# Patient Record
Sex: FEMALE | Race: WHITE | NOT HISPANIC OR LATINO | Employment: OTHER | URBAN - METROPOLITAN AREA
[De-identification: names, ages, dates, MRNs, and addresses within clinical notes are randomized per-mention and may not be internally consistent; named-entity substitution may affect disease eponyms.]

---

## 2017-02-18 ENCOUNTER — COMMUNICATION - HEALTHEAST (OUTPATIENT)
Dept: FAMILY MEDICINE | Facility: CLINIC | Age: 77
End: 2017-02-18

## 2017-02-18 DIAGNOSIS — I10 ESSENTIAL HYPERTENSION, BENIGN: ICD-10-CM

## 2017-03-08 ENCOUNTER — OFFICE VISIT - HEALTHEAST (OUTPATIENT)
Dept: FAMILY MEDICINE | Facility: CLINIC | Age: 77
End: 2017-03-08

## 2017-03-08 ENCOUNTER — RECORDS - HEALTHEAST (OUTPATIENT)
Dept: GENERAL RADIOLOGY | Facility: CLINIC | Age: 77
End: 2017-03-08

## 2017-03-08 DIAGNOSIS — R05.9 COUGH: ICD-10-CM

## 2017-03-08 DIAGNOSIS — I10 ESSENTIAL HYPERTENSION, BENIGN: ICD-10-CM

## 2017-03-08 DIAGNOSIS — E11.9 TYPE 2 DIABETES MELLITUS WITHOUT COMPLICATION (H): ICD-10-CM

## 2017-03-08 DIAGNOSIS — E11.9 TYPE II DIABETES MELLITUS (H): ICD-10-CM

## 2017-03-08 DIAGNOSIS — M19.049 OSTEOARTHROSIS, HAND: ICD-10-CM

## 2017-03-08 LAB
CHOLEST SERPL-MCNC: 181 MG/DL
FASTING STATUS PATIENT QL REPORTED: YES
HBA1C MFR BLD: 5.7 % (ref 3.5–6)
HDLC SERPL-MCNC: 55 MG/DL
LDLC SERPL CALC-MCNC: 62 MG/DL
TRIGL SERPL-MCNC: 322 MG/DL

## 2017-03-08 RX ORDER — BLOOD PRESSURE TEST KIT
KIT MISCELLANEOUS
Qty: 1 EACH | Refills: 0 | Status: SHIPPED | OUTPATIENT
Start: 2017-03-08 | End: 2022-10-18

## 2017-03-08 ASSESSMENT — MIFFLIN-ST. JEOR: SCORE: 1103.19

## 2017-03-10 ENCOUNTER — COMMUNICATION - HEALTHEAST (OUTPATIENT)
Dept: FAMILY MEDICINE | Facility: CLINIC | Age: 77
End: 2017-03-10

## 2017-04-26 ENCOUNTER — RECORDS - HEALTHEAST (OUTPATIENT)
Dept: ADMINISTRATIVE | Facility: OTHER | Age: 77
End: 2017-04-26

## 2017-05-23 ENCOUNTER — COMMUNICATION - HEALTHEAST (OUTPATIENT)
Dept: FAMILY MEDICINE | Facility: CLINIC | Age: 77
End: 2017-05-23

## 2017-05-23 DIAGNOSIS — R73.01 IMPAIRED FASTING GLUCOSE: ICD-10-CM

## 2017-05-25 ENCOUNTER — COMMUNICATION - HEALTHEAST (OUTPATIENT)
Dept: FAMILY MEDICINE | Facility: CLINIC | Age: 77
End: 2017-05-25

## 2017-06-01 ENCOUNTER — COMMUNICATION - HEALTHEAST (OUTPATIENT)
Dept: FAMILY MEDICINE | Facility: CLINIC | Age: 77
End: 2017-06-01

## 2017-06-01 DIAGNOSIS — R51.9 HEADACHE: ICD-10-CM

## 2017-06-06 ENCOUNTER — COMMUNICATION - HEALTHEAST (OUTPATIENT)
Dept: FAMILY MEDICINE | Facility: CLINIC | Age: 77
End: 2017-06-06

## 2017-06-06 DIAGNOSIS — R73.01 IMPAIRED FASTING GLUCOSE: ICD-10-CM

## 2017-06-06 DIAGNOSIS — I10 ESSENTIAL HYPERTENSION, BENIGN: ICD-10-CM

## 2017-07-03 ENCOUNTER — COMMUNICATION - HEALTHEAST (OUTPATIENT)
Dept: FAMILY MEDICINE | Facility: CLINIC | Age: 77
End: 2017-07-03

## 2017-07-03 DIAGNOSIS — M19.049 OSTEOARTHROSIS, HAND: ICD-10-CM

## 2017-07-05 ENCOUNTER — COMMUNICATION - HEALTHEAST (OUTPATIENT)
Dept: FAMILY MEDICINE | Facility: CLINIC | Age: 77
End: 2017-07-05

## 2017-07-05 DIAGNOSIS — M19.049 OSTEOARTHROSIS, HAND: ICD-10-CM

## 2017-07-13 ENCOUNTER — COMMUNICATION - HEALTHEAST (OUTPATIENT)
Dept: FAMILY MEDICINE | Facility: CLINIC | Age: 77
End: 2017-07-13

## 2017-07-13 DIAGNOSIS — R06.2 WHEEZING: ICD-10-CM

## 2017-07-19 ENCOUNTER — COMMUNICATION - HEALTHEAST (OUTPATIENT)
Dept: ENDOCRINOLOGY | Facility: CLINIC | Age: 77
End: 2017-07-19

## 2017-07-19 DIAGNOSIS — E11.9 DIABETES (H): ICD-10-CM

## 2017-08-31 ENCOUNTER — COMMUNICATION - HEALTHEAST (OUTPATIENT)
Dept: FAMILY MEDICINE | Facility: CLINIC | Age: 77
End: 2017-08-31

## 2017-08-31 DIAGNOSIS — R51.9 HEADACHE: ICD-10-CM

## 2017-09-12 ENCOUNTER — COMMUNICATION - HEALTHEAST (OUTPATIENT)
Dept: FAMILY MEDICINE | Facility: CLINIC | Age: 77
End: 2017-09-12

## 2017-09-18 ENCOUNTER — COMMUNICATION - HEALTHEAST (OUTPATIENT)
Dept: FAMILY MEDICINE | Facility: CLINIC | Age: 77
End: 2017-09-18

## 2017-09-18 ENCOUNTER — OFFICE VISIT - HEALTHEAST (OUTPATIENT)
Dept: FAMILY MEDICINE | Facility: CLINIC | Age: 77
End: 2017-09-18

## 2017-09-18 DIAGNOSIS — Z00.00 HEALTHCARE MAINTENANCE: ICD-10-CM

## 2017-09-18 DIAGNOSIS — I10 HTN (HYPERTENSION): ICD-10-CM

## 2017-09-18 DIAGNOSIS — E11.9 DM (DIABETES MELLITUS) (H): ICD-10-CM

## 2017-09-18 DIAGNOSIS — Z78.0 POST-MENOPAUSAL: ICD-10-CM

## 2017-09-18 DIAGNOSIS — E78.5 HYPERLIPIDEMIA: ICD-10-CM

## 2017-09-18 DIAGNOSIS — R06.2 WHEEZING: ICD-10-CM

## 2017-09-18 LAB — HBA1C MFR BLD: 6.4 % (ref 3.5–6)

## 2017-09-20 ENCOUNTER — COMMUNICATION - HEALTHEAST (OUTPATIENT)
Dept: PULMONOLOGY | Facility: OTHER | Age: 77
End: 2017-09-20

## 2017-10-06 ENCOUNTER — RECORDS - HEALTHEAST (OUTPATIENT)
Dept: ADMINISTRATIVE | Facility: OTHER | Age: 77
End: 2017-10-06

## 2017-10-06 ENCOUNTER — RECORDS - HEALTHEAST (OUTPATIENT)
Dept: BONE DENSITY | Facility: CLINIC | Age: 77
End: 2017-10-06

## 2017-10-06 ENCOUNTER — HOSPITAL ENCOUNTER (OUTPATIENT)
Dept: MAMMOGRAPHY | Facility: HOSPITAL | Age: 77
Discharge: HOME | End: 2017-10-06
Attending: FAMILY MEDICINE

## 2017-10-06 DIAGNOSIS — Z78.0 ASYMPTOMATIC MENOPAUSAL STATE: ICD-10-CM

## 2017-10-06 DIAGNOSIS — Z12.31 VISIT FOR SCREENING MAMMOGRAM: ICD-10-CM

## 2017-10-06 DIAGNOSIS — Z00.00 HEALTHCARE MAINTENANCE: ICD-10-CM

## 2017-10-11 ENCOUNTER — COMMUNICATION - HEALTHEAST (OUTPATIENT)
Dept: FAMILY MEDICINE | Facility: CLINIC | Age: 77
End: 2017-10-11

## 2017-10-11 DIAGNOSIS — M19.049 OSTEOARTHROSIS, HAND: ICD-10-CM

## 2017-10-16 ENCOUNTER — COMMUNICATION - HEALTHEAST (OUTPATIENT)
Dept: FAMILY MEDICINE | Facility: CLINIC | Age: 77
End: 2017-10-16

## 2017-10-17 ENCOUNTER — RECORDS - HEALTHEAST (OUTPATIENT)
Dept: PULMONOLOGY | Facility: OTHER | Age: 77
End: 2017-10-17

## 2017-10-17 ENCOUNTER — RECORDS - HEALTHEAST (OUTPATIENT)
Dept: ADMINISTRATIVE | Facility: OTHER | Age: 77
End: 2017-10-17

## 2017-10-17 DIAGNOSIS — R06.2 WHEEZING: ICD-10-CM

## 2017-10-20 ENCOUNTER — COMMUNICATION - HEALTHEAST (OUTPATIENT)
Dept: FAMILY MEDICINE | Facility: CLINIC | Age: 77
End: 2017-10-20

## 2017-10-25 ENCOUNTER — COMMUNICATION - HEALTHEAST (OUTPATIENT)
Dept: FAMILY MEDICINE | Facility: CLINIC | Age: 77
End: 2017-10-25

## 2017-10-25 DIAGNOSIS — M19.049 OSTEOARTHROSIS, HAND: ICD-10-CM

## 2017-10-26 ENCOUNTER — HOSPITAL ENCOUNTER (OUTPATIENT)
Dept: CT IMAGING | Facility: HOSPITAL | Age: 77
Discharge: HOME | End: 2017-10-26
Attending: INTERNAL MEDICINE

## 2017-10-26 ENCOUNTER — OFFICE VISIT - HEALTHEAST (OUTPATIENT)
Dept: PULMONOLOGY | Facility: OTHER | Age: 77
End: 2017-10-26

## 2017-10-26 DIAGNOSIS — R05.8 UPPER AIRWAY COUGH SYNDROME: ICD-10-CM

## 2017-10-26 DIAGNOSIS — I10 ESSENTIAL HYPERTENSION: ICD-10-CM

## 2017-10-26 ASSESSMENT — MIFFLIN-ST. JEOR: SCORE: 1145.82

## 2017-10-27 LAB — TOTAL IGE - HISTORICAL: 260 KU/L (ref 0–100)

## 2017-11-02 ENCOUNTER — COMMUNICATION - HEALTHEAST (OUTPATIENT)
Dept: PULMONOLOGY | Facility: OTHER | Age: 77
End: 2017-11-02

## 2017-11-03 ENCOUNTER — COMMUNICATION - HEALTHEAST (OUTPATIENT)
Dept: FAMILY MEDICINE | Facility: CLINIC | Age: 77
End: 2017-11-03

## 2017-12-06 ENCOUNTER — COMMUNICATION - HEALTHEAST (OUTPATIENT)
Dept: FAMILY MEDICINE | Facility: CLINIC | Age: 77
End: 2017-12-06

## 2017-12-06 DIAGNOSIS — R51.9 HEADACHE: ICD-10-CM

## 2017-12-14 ENCOUNTER — COMMUNICATION - HEALTHEAST (OUTPATIENT)
Dept: FAMILY MEDICINE | Facility: CLINIC | Age: 77
End: 2017-12-14

## 2017-12-14 DIAGNOSIS — E11.9 DIABETES (H): ICD-10-CM

## 2018-01-08 ENCOUNTER — OFFICE VISIT - HEALTHEAST (OUTPATIENT)
Dept: FAMILY MEDICINE | Facility: CLINIC | Age: 78
End: 2018-01-08

## 2018-01-08 DIAGNOSIS — J98.01 ACUTE BRONCHOSPASM: ICD-10-CM

## 2018-01-08 DIAGNOSIS — J40 BRONCHITIS: ICD-10-CM

## 2018-01-17 ENCOUNTER — COMMUNICATION - HEALTHEAST (OUTPATIENT)
Dept: FAMILY MEDICINE | Facility: CLINIC | Age: 78
End: 2018-01-17

## 2018-01-17 DIAGNOSIS — M19.049 OSTEOARTHROSIS, HAND: ICD-10-CM

## 2018-02-22 ENCOUNTER — OFFICE VISIT - HEALTHEAST (OUTPATIENT)
Dept: PULMONOLOGY | Facility: OTHER | Age: 78
End: 2018-02-22

## 2018-02-22 ENCOUNTER — AMBULATORY - HEALTHEAST (OUTPATIENT)
Dept: PULMONOLOGY | Facility: OTHER | Age: 78
End: 2018-02-22

## 2018-02-22 DIAGNOSIS — R06.2 WHEEZING: ICD-10-CM

## 2018-02-22 DIAGNOSIS — J45.40 MODERATE PERSISTENT ASTHMA WITHOUT COMPLICATION: ICD-10-CM

## 2018-02-26 ENCOUNTER — RECORDS - HEALTHEAST (OUTPATIENT)
Dept: ADMINISTRATIVE | Facility: OTHER | Age: 78
End: 2018-02-26

## 2018-02-26 ENCOUNTER — COMMUNICATION - HEALTHEAST (OUTPATIENT)
Dept: PULMONOLOGY | Facility: OTHER | Age: 78
End: 2018-02-26

## 2018-03-05 ENCOUNTER — COMMUNICATION - HEALTHEAST (OUTPATIENT)
Dept: PULMONOLOGY | Facility: OTHER | Age: 78
End: 2018-03-05

## 2018-03-06 ENCOUNTER — AMBULATORY - HEALTHEAST (OUTPATIENT)
Dept: PULMONOLOGY | Facility: OTHER | Age: 78
End: 2018-03-06

## 2018-03-06 DIAGNOSIS — J45.40 MODERATE PERSISTENT ASTHMA WITHOUT COMPLICATION: ICD-10-CM

## 2018-03-06 RX ORDER — ALBUTEROL SULFATE 0.83 MG/ML
2.5 SOLUTION RESPIRATORY (INHALATION) EVERY 6 HOURS PRN
Qty: 380 ML | Refills: 11 | Status: SHIPPED | OUTPATIENT
Start: 2018-03-06 | End: 2018-07-16

## 2018-03-08 ENCOUNTER — AMBULATORY - HEALTHEAST (OUTPATIENT)
Dept: PULMONOLOGY | Facility: OTHER | Age: 78
End: 2018-03-08

## 2018-03-08 ENCOUNTER — COMMUNICATION - HEALTHEAST (OUTPATIENT)
Dept: PULMONOLOGY | Facility: OTHER | Age: 78
End: 2018-03-08

## 2018-03-08 DIAGNOSIS — J45.909 ASTHMA: ICD-10-CM

## 2018-03-09 ENCOUNTER — COMMUNICATION - HEALTHEAST (OUTPATIENT)
Dept: PULMONOLOGY | Facility: OTHER | Age: 78
End: 2018-03-09

## 2018-03-18 ENCOUNTER — COMMUNICATION - HEALTHEAST (OUTPATIENT)
Dept: FAMILY MEDICINE | Facility: CLINIC | Age: 78
End: 2018-03-18

## 2018-03-18 DIAGNOSIS — E78.5 HYPERLIPIDEMIA: ICD-10-CM

## 2018-03-31 ENCOUNTER — OFFICE VISIT - HEALTHEAST (OUTPATIENT)
Dept: FAMILY MEDICINE | Facility: CLINIC | Age: 78
End: 2018-03-31

## 2018-03-31 DIAGNOSIS — R09.82 POST-NASAL DISCHARGE: ICD-10-CM

## 2018-03-31 DIAGNOSIS — R07.0 THROAT PAIN: ICD-10-CM

## 2018-03-31 LAB — DEPRECATED S PYO AG THROAT QL EIA: NORMAL

## 2018-04-01 LAB — GROUP A STREP BY PCR: NORMAL

## 2018-04-12 ENCOUNTER — COMMUNICATION - HEALTHEAST (OUTPATIENT)
Dept: FAMILY MEDICINE | Facility: CLINIC | Age: 78
End: 2018-04-12

## 2018-04-12 DIAGNOSIS — M19.049 OSTEOARTHROSIS, HAND: ICD-10-CM

## 2018-04-25 ENCOUNTER — OFFICE VISIT - HEALTHEAST (OUTPATIENT)
Dept: FAMILY MEDICINE | Facility: CLINIC | Age: 78
End: 2018-04-25

## 2018-04-25 DIAGNOSIS — E11.9 TYPE II DIABETES MELLITUS (H): ICD-10-CM

## 2018-04-25 DIAGNOSIS — J45.40 MODERATE PERSISTENT ASTHMA WITHOUT COMPLICATION: ICD-10-CM

## 2018-04-25 LAB
ALBUMIN SERPL-MCNC: 3.6 G/DL (ref 3.5–5)
ALP SERPL-CCNC: 74 U/L (ref 45–120)
ALT SERPL W P-5'-P-CCNC: 22 U/L (ref 0–45)
ANION GAP SERPL CALCULATED.3IONS-SCNC: 11 MMOL/L (ref 5–18)
AST SERPL W P-5'-P-CCNC: 18 U/L (ref 0–40)
BILIRUB SERPL-MCNC: 1.2 MG/DL (ref 0–1)
BUN SERPL-MCNC: 17 MG/DL (ref 8–28)
CALCIUM SERPL-MCNC: 9.5 MG/DL (ref 8.5–10.5)
CHLORIDE BLD-SCNC: 101 MMOL/L (ref 98–107)
CHOLEST SERPL-MCNC: 168 MG/DL
CO2 SERPL-SCNC: 26 MMOL/L (ref 22–31)
CREAT SERPL-MCNC: 0.9 MG/DL (ref 0.6–1.1)
FASTING STATUS PATIENT QL REPORTED: YES
GFR SERPL CREATININE-BSD FRML MDRD: >60 ML/MIN/1.73M2
GLUCOSE BLD-MCNC: 107 MG/DL (ref 70–125)
HBA1C MFR BLD: 6.5 % (ref 3.5–6)
HDLC SERPL-MCNC: 56 MG/DL
LDLC SERPL CALC-MCNC: 63 MG/DL
POTASSIUM BLD-SCNC: 4.3 MMOL/L (ref 3.5–5)
PROT SERPL-MCNC: 6.3 G/DL (ref 6–8)
SODIUM SERPL-SCNC: 138 MMOL/L (ref 136–145)
TRIGL SERPL-MCNC: 245 MG/DL

## 2018-04-26 ENCOUNTER — OFFICE VISIT - HEALTHEAST (OUTPATIENT)
Dept: PULMONOLOGY | Facility: OTHER | Age: 78
End: 2018-04-26

## 2018-04-26 ENCOUNTER — COMMUNICATION - HEALTHEAST (OUTPATIENT)
Dept: FAMILY MEDICINE | Facility: CLINIC | Age: 78
End: 2018-04-26

## 2018-04-26 DIAGNOSIS — R76.8 ELEVATED IGE LEVEL: ICD-10-CM

## 2018-04-26 DIAGNOSIS — J45.40 MODERATE PERSISTENT ASTHMA WITHOUT COMPLICATION: ICD-10-CM

## 2018-05-02 ENCOUNTER — RECORDS - HEALTHEAST (OUTPATIENT)
Dept: ADMINISTRATIVE | Facility: OTHER | Age: 78
End: 2018-05-02

## 2018-05-17 ENCOUNTER — OFFICE VISIT - HEALTHEAST (OUTPATIENT)
Dept: ALLERGY | Facility: CLINIC | Age: 78
End: 2018-05-17

## 2018-05-17 DIAGNOSIS — J45.20 MILD INTERMITTENT ASTHMA WITHOUT COMPLICATION: ICD-10-CM

## 2018-05-17 DIAGNOSIS — R09.81 NASAL CONGESTION: ICD-10-CM

## 2018-05-17 ASSESSMENT — MIFFLIN-ST. JEOR: SCORE: 1134.93

## 2018-05-31 ENCOUNTER — COMMUNICATION - HEALTHEAST (OUTPATIENT)
Dept: FAMILY MEDICINE | Facility: CLINIC | Age: 78
End: 2018-05-31

## 2018-05-31 DIAGNOSIS — I10 ESSENTIAL HYPERTENSION, BENIGN: ICD-10-CM

## 2018-06-05 ENCOUNTER — COMMUNICATION - HEALTHEAST (OUTPATIENT)
Dept: FAMILY MEDICINE | Facility: CLINIC | Age: 78
End: 2018-06-05

## 2018-06-05 DIAGNOSIS — R51.9 HEADACHE: ICD-10-CM

## 2018-07-16 ENCOUNTER — OFFICE VISIT - HEALTHEAST (OUTPATIENT)
Dept: PULMONOLOGY | Facility: OTHER | Age: 78
End: 2018-07-16

## 2018-07-16 DIAGNOSIS — J45.20 MILD INTERMITTENT ASTHMA WITHOUT COMPLICATION: ICD-10-CM

## 2018-07-22 ENCOUNTER — COMMUNICATION - HEALTHEAST (OUTPATIENT)
Dept: FAMILY MEDICINE | Facility: CLINIC | Age: 78
End: 2018-07-22

## 2018-07-22 DIAGNOSIS — I10 ESSENTIAL HYPERTENSION, BENIGN: ICD-10-CM

## 2018-08-03 ENCOUNTER — COMMUNICATION - HEALTHEAST (OUTPATIENT)
Dept: FAMILY MEDICINE | Facility: CLINIC | Age: 78
End: 2018-08-03

## 2018-08-03 DIAGNOSIS — R73.01 IMPAIRED FASTING GLUCOSE: ICD-10-CM

## 2018-08-28 ENCOUNTER — COMMUNICATION - HEALTHEAST (OUTPATIENT)
Dept: FAMILY MEDICINE | Facility: CLINIC | Age: 78
End: 2018-08-28

## 2018-08-28 DIAGNOSIS — M19.049 OSTEOARTHROSIS, HAND: ICD-10-CM

## 2018-09-18 ENCOUNTER — COMMUNICATION - HEALTHEAST (OUTPATIENT)
Dept: FAMILY MEDICINE | Facility: CLINIC | Age: 78
End: 2018-09-18

## 2018-09-18 DIAGNOSIS — E78.5 HYPERLIPIDEMIA: ICD-10-CM

## 2018-09-20 ENCOUNTER — COMMUNICATION - HEALTHEAST (OUTPATIENT)
Dept: FAMILY MEDICINE | Facility: CLINIC | Age: 78
End: 2018-09-20

## 2018-09-20 DIAGNOSIS — M19.049 OSTEOARTHROSIS, HAND: ICD-10-CM

## 2018-10-17 ENCOUNTER — OFFICE VISIT - HEALTHEAST (OUTPATIENT)
Dept: FAMILY MEDICINE | Facility: CLINIC | Age: 78
End: 2018-10-17

## 2018-10-17 DIAGNOSIS — R51.9 HEADACHE: ICD-10-CM

## 2018-10-17 DIAGNOSIS — E11.9 TYPE II DIABETES MELLITUS (H): ICD-10-CM

## 2018-10-17 DIAGNOSIS — G47.00 INSOMNIA, UNSPECIFIED TYPE: ICD-10-CM

## 2018-10-17 DIAGNOSIS — E11.9 DIABETES MELLITUS (H): ICD-10-CM

## 2018-10-17 DIAGNOSIS — I10 ESSENTIAL HYPERTENSION: ICD-10-CM

## 2018-10-17 LAB
CREAT UR-MCNC: 21.8 MG/DL
HBA1C MFR BLD: 6.5 % (ref 3.5–6)
MICROALBUMIN UR-MCNC: <0.5 MG/DL (ref 0–1.99)
MICROALBUMIN/CREAT UR: NORMAL MG/G

## 2018-10-18 ENCOUNTER — COMMUNICATION - HEALTHEAST (OUTPATIENT)
Dept: FAMILY MEDICINE | Facility: CLINIC | Age: 78
End: 2018-10-18

## 2018-11-25 ENCOUNTER — COMMUNICATION - HEALTHEAST (OUTPATIENT)
Dept: ENDOCRINOLOGY | Facility: CLINIC | Age: 78
End: 2018-11-25

## 2018-11-25 DIAGNOSIS — E11.9 DIABETES (H): ICD-10-CM

## 2018-11-29 ENCOUNTER — COMMUNICATION - HEALTHEAST (OUTPATIENT)
Dept: FAMILY MEDICINE | Facility: CLINIC | Age: 78
End: 2018-11-29

## 2018-11-29 DIAGNOSIS — E11.9 DIABETES (H): ICD-10-CM

## 2018-12-03 ENCOUNTER — COMMUNICATION - HEALTHEAST (OUTPATIENT)
Dept: FAMILY MEDICINE | Facility: CLINIC | Age: 78
End: 2018-12-03

## 2018-12-13 ENCOUNTER — COMMUNICATION - HEALTHEAST (OUTPATIENT)
Dept: FAMILY MEDICINE | Facility: CLINIC | Age: 78
End: 2018-12-13

## 2018-12-21 ENCOUNTER — COMMUNICATION - HEALTHEAST (OUTPATIENT)
Dept: FAMILY MEDICINE | Facility: CLINIC | Age: 78
End: 2018-12-21

## 2019-01-09 ENCOUNTER — RECORDS - HEALTHEAST (OUTPATIENT)
Dept: ADMINISTRATIVE | Facility: OTHER | Age: 79
End: 2019-01-09

## 2019-01-16 ENCOUNTER — COMMUNICATION - HEALTHEAST (OUTPATIENT)
Dept: FAMILY MEDICINE | Facility: CLINIC | Age: 79
End: 2019-01-16

## 2019-01-16 DIAGNOSIS — I10 ESSENTIAL HYPERTENSION, BENIGN: ICD-10-CM

## 2019-01-19 ENCOUNTER — COMMUNICATION - HEALTHEAST (OUTPATIENT)
Dept: FAMILY MEDICINE | Facility: CLINIC | Age: 79
End: 2019-01-19

## 2019-01-19 DIAGNOSIS — R51.9 HEADACHE: ICD-10-CM

## 2019-01-19 DIAGNOSIS — G47.00 INSOMNIA, UNSPECIFIED TYPE: ICD-10-CM

## 2019-01-24 ENCOUNTER — OFFICE VISIT - HEALTHEAST (OUTPATIENT)
Dept: FAMILY MEDICINE | Facility: CLINIC | Age: 79
End: 2019-01-24

## 2019-01-24 ENCOUNTER — COMMUNICATION - HEALTHEAST (OUTPATIENT)
Dept: SCHEDULING | Facility: CLINIC | Age: 79
End: 2019-01-24

## 2019-01-24 DIAGNOSIS — J40 BRONCHITIS: ICD-10-CM

## 2019-02-22 ENCOUNTER — COMMUNICATION - HEALTHEAST (OUTPATIENT)
Dept: PULMONOLOGY | Facility: OTHER | Age: 79
End: 2019-02-22

## 2019-02-22 ENCOUNTER — AMBULATORY - HEALTHEAST (OUTPATIENT)
Dept: PULMONOLOGY | Facility: OTHER | Age: 79
End: 2019-02-22

## 2019-02-22 DIAGNOSIS — J45.21 MILD INTERMITTENT ASTHMA WITH EXACERBATION: ICD-10-CM

## 2019-03-07 ENCOUNTER — COMMUNICATION - HEALTHEAST (OUTPATIENT)
Dept: FAMILY MEDICINE | Facility: CLINIC | Age: 79
End: 2019-03-07

## 2019-03-07 DIAGNOSIS — I10 ESSENTIAL HYPERTENSION, BENIGN: ICD-10-CM

## 2019-03-08 ENCOUNTER — OFFICE VISIT - HEALTHEAST (OUTPATIENT)
Dept: PULMONOLOGY | Facility: OTHER | Age: 79
End: 2019-03-08

## 2019-03-08 DIAGNOSIS — J82.83 EOSINOPHILIC ASTHMA: ICD-10-CM

## 2019-03-08 DIAGNOSIS — J45.909 REACTIVE AIRWAY DISEASE WITHOUT COMPLICATION, UNSPECIFIED ASTHMA SEVERITY, UNSPECIFIED WHETHER PERSISTENT: ICD-10-CM

## 2019-03-08 DIAGNOSIS — J45.20 MILD INTERMITTENT ASTHMA WITHOUT COMPLICATION: ICD-10-CM

## 2019-03-13 ENCOUNTER — COMMUNICATION - HEALTHEAST (OUTPATIENT)
Dept: FAMILY MEDICINE | Facility: CLINIC | Age: 79
End: 2019-03-13

## 2019-03-13 DIAGNOSIS — E78.5 HYPERLIPIDEMIA: ICD-10-CM

## 2019-04-17 ENCOUNTER — OFFICE VISIT - HEALTHEAST (OUTPATIENT)
Dept: FAMILY MEDICINE | Facility: CLINIC | Age: 79
End: 2019-04-17

## 2019-04-17 ENCOUNTER — COMMUNICATION - HEALTHEAST (OUTPATIENT)
Dept: FAMILY MEDICINE | Facility: CLINIC | Age: 79
End: 2019-04-17

## 2019-04-17 DIAGNOSIS — J45.909 REACTIVE AIRWAY DISEASE WITHOUT COMPLICATION, UNSPECIFIED ASTHMA SEVERITY, UNSPECIFIED WHETHER PERSISTENT: ICD-10-CM

## 2019-04-17 DIAGNOSIS — E11.22 TYPE 2 DIABETES MELLITUS WITH STAGE 1 CHRONIC KIDNEY DISEASE, WITHOUT LONG-TERM CURRENT USE OF INSULIN (H): ICD-10-CM

## 2019-04-17 DIAGNOSIS — M19.049 OSTEOARTHROSIS, HAND: ICD-10-CM

## 2019-04-17 DIAGNOSIS — D22.9 ATYPICAL NEVUS: ICD-10-CM

## 2019-04-17 DIAGNOSIS — N18.1 TYPE 2 DIABETES MELLITUS WITH STAGE 1 CHRONIC KIDNEY DISEASE, WITHOUT LONG-TERM CURRENT USE OF INSULIN (H): ICD-10-CM

## 2019-04-17 LAB
ALBUMIN SERPL-MCNC: 3.8 G/DL (ref 3.5–5)
ALP SERPL-CCNC: 67 U/L (ref 45–120)
ALT SERPL W P-5'-P-CCNC: 29 U/L (ref 0–45)
ANION GAP SERPL CALCULATED.3IONS-SCNC: 12 MMOL/L (ref 5–18)
AST SERPL W P-5'-P-CCNC: 19 U/L (ref 0–40)
BILIRUB SERPL-MCNC: 0.9 MG/DL (ref 0–1)
BUN SERPL-MCNC: 22 MG/DL (ref 8–28)
CALCIUM SERPL-MCNC: 9.3 MG/DL (ref 8.5–10.5)
CHLORIDE BLD-SCNC: 97 MMOL/L (ref 98–107)
CHOLEST SERPL-MCNC: 151 MG/DL
CO2 SERPL-SCNC: 26 MMOL/L (ref 22–31)
CREAT SERPL-MCNC: 0.91 MG/DL (ref 0.6–1.1)
FASTING STATUS PATIENT QL REPORTED: YES
GFR SERPL CREATININE-BSD FRML MDRD: 60 ML/MIN/1.73M2
GLUCOSE BLD-MCNC: 115 MG/DL (ref 70–125)
HBA1C MFR BLD: 6.8 % (ref 3.5–6)
HDLC SERPL-MCNC: 56 MG/DL
LDLC SERPL CALC-MCNC: 39 MG/DL
POTASSIUM BLD-SCNC: 3.7 MMOL/L (ref 3.5–5)
PROT SERPL-MCNC: 6.2 G/DL (ref 6–8)
SODIUM SERPL-SCNC: 135 MMOL/L (ref 136–145)
TRIGL SERPL-MCNC: 281 MG/DL

## 2019-04-18 ENCOUNTER — COMMUNICATION - HEALTHEAST (OUTPATIENT)
Dept: FAMILY MEDICINE | Facility: CLINIC | Age: 79
End: 2019-04-18

## 2019-04-24 ENCOUNTER — COMMUNICATION - HEALTHEAST (OUTPATIENT)
Dept: FAMILY MEDICINE | Facility: CLINIC | Age: 79
End: 2019-04-24

## 2019-04-24 DIAGNOSIS — I10 ESSENTIAL HYPERTENSION, BENIGN: ICD-10-CM

## 2019-04-25 ENCOUNTER — AMBULATORY - HEALTHEAST (OUTPATIENT)
Dept: NURSING | Facility: CLINIC | Age: 79
End: 2019-04-25

## 2019-04-25 DIAGNOSIS — I10 ESSENTIAL HYPERTENSION: ICD-10-CM

## 2019-04-27 ENCOUNTER — COMMUNICATION - HEALTHEAST (OUTPATIENT)
Dept: FAMILY MEDICINE | Facility: CLINIC | Age: 79
End: 2019-04-27

## 2019-04-27 DIAGNOSIS — R73.01 IMPAIRED FASTING GLUCOSE: ICD-10-CM

## 2019-05-08 ENCOUNTER — RECORDS - HEALTHEAST (OUTPATIENT)
Dept: ADMINISTRATIVE | Facility: OTHER | Age: 79
End: 2019-05-08

## 2019-05-16 ENCOUNTER — RECORDS - HEALTHEAST (OUTPATIENT)
Dept: HEALTH INFORMATION MANAGEMENT | Facility: CLINIC | Age: 79
End: 2019-05-16

## 2019-06-04 ENCOUNTER — COMMUNICATION - HEALTHEAST (OUTPATIENT)
Dept: FAMILY MEDICINE | Facility: CLINIC | Age: 79
End: 2019-06-04

## 2019-06-04 DIAGNOSIS — N18.1 TYPE 2 DIABETES MELLITUS WITH STAGE 1 CHRONIC KIDNEY DISEASE, WITHOUT LONG-TERM CURRENT USE OF INSULIN (H): ICD-10-CM

## 2019-06-04 DIAGNOSIS — E11.22 TYPE 2 DIABETES MELLITUS WITH STAGE 1 CHRONIC KIDNEY DISEASE, WITHOUT LONG-TERM CURRENT USE OF INSULIN (H): ICD-10-CM

## 2019-06-10 ENCOUNTER — COMMUNICATION - HEALTHEAST (OUTPATIENT)
Dept: FAMILY MEDICINE | Facility: CLINIC | Age: 79
End: 2019-06-10

## 2019-06-10 DIAGNOSIS — M19.049 OSTEOARTHROSIS, HAND: ICD-10-CM

## 2019-07-17 ENCOUNTER — COMMUNICATION - HEALTHEAST (OUTPATIENT)
Dept: FAMILY MEDICINE | Facility: CLINIC | Age: 79
End: 2019-07-17

## 2019-07-17 DIAGNOSIS — G47.00 INSOMNIA, UNSPECIFIED TYPE: ICD-10-CM

## 2019-07-22 ENCOUNTER — COMMUNICATION - HEALTHEAST (OUTPATIENT)
Dept: FAMILY MEDICINE | Facility: CLINIC | Age: 79
End: 2019-07-22

## 2019-07-22 DIAGNOSIS — I10 ESSENTIAL HYPERTENSION, BENIGN: ICD-10-CM

## 2019-09-09 ENCOUNTER — COMMUNICATION - HEALTHEAST (OUTPATIENT)
Dept: FAMILY MEDICINE | Facility: CLINIC | Age: 79
End: 2019-09-09

## 2019-09-09 DIAGNOSIS — I10 ESSENTIAL HYPERTENSION, BENIGN: ICD-10-CM

## 2019-09-13 ENCOUNTER — OFFICE VISIT - HEALTHEAST (OUTPATIENT)
Dept: PULMONOLOGY | Facility: OTHER | Age: 79
End: 2019-09-13

## 2019-09-13 DIAGNOSIS — J45.20 MILD INTERMITTENT ASTHMA WITHOUT COMPLICATION: ICD-10-CM

## 2019-10-14 ENCOUNTER — OFFICE VISIT - HEALTHEAST (OUTPATIENT)
Dept: FAMILY MEDICINE | Facility: CLINIC | Age: 79
End: 2019-10-14

## 2019-10-14 DIAGNOSIS — G56.00 CARPAL TUNNEL SYNDROME, UNSPECIFIED LATERALITY: ICD-10-CM

## 2019-10-14 DIAGNOSIS — G47.00 INSOMNIA, UNSPECIFIED TYPE: ICD-10-CM

## 2019-10-14 DIAGNOSIS — R05.8 UPPER AIRWAY COUGH SYNDROME: ICD-10-CM

## 2019-10-14 DIAGNOSIS — I10 ESSENTIAL HYPERTENSION: ICD-10-CM

## 2019-10-14 DIAGNOSIS — M19.041 OSTEOARTHRITIS OF BOTH HANDS, UNSPECIFIED OSTEOARTHRITIS TYPE: ICD-10-CM

## 2019-10-14 DIAGNOSIS — M19.042 OSTEOARTHRITIS OF BOTH HANDS, UNSPECIFIED OSTEOARTHRITIS TYPE: ICD-10-CM

## 2019-10-14 DIAGNOSIS — G89.29 CHRONIC NONINTRACTABLE HEADACHE, UNSPECIFIED HEADACHE TYPE: ICD-10-CM

## 2019-10-14 DIAGNOSIS — G57.50 TARSAL TUNNEL SYNDROME, UNSPECIFIED LATERALITY: ICD-10-CM

## 2019-10-14 DIAGNOSIS — Z76.89 ESTABLISHING CARE WITH NEW DOCTOR, ENCOUNTER FOR: ICD-10-CM

## 2019-10-14 DIAGNOSIS — M35.00 SICCA SYNDROME (H): ICD-10-CM

## 2019-10-14 DIAGNOSIS — J82.83 EOSINOPHILIC ASTHMA: ICD-10-CM

## 2019-10-14 DIAGNOSIS — N18.1 TYPE 2 DIABETES MELLITUS WITH STAGE 1 CHRONIC KIDNEY DISEASE, WITHOUT LONG-TERM CURRENT USE OF INSULIN (H): ICD-10-CM

## 2019-10-14 DIAGNOSIS — R51.9 CHRONIC NONINTRACTABLE HEADACHE, UNSPECIFIED HEADACHE TYPE: ICD-10-CM

## 2019-10-14 DIAGNOSIS — E11.22 TYPE 2 DIABETES MELLITUS WITH STAGE 1 CHRONIC KIDNEY DISEASE, WITHOUT LONG-TERM CURRENT USE OF INSULIN (H): ICD-10-CM

## 2019-10-14 DIAGNOSIS — E78.5 HYPERLIPIDEMIA, UNSPECIFIED HYPERLIPIDEMIA TYPE: ICD-10-CM

## 2019-10-14 LAB
ALBUMIN SERPL-MCNC: 3.9 G/DL (ref 3.5–5)
ALP SERPL-CCNC: 70 U/L (ref 45–120)
ALT SERPL W P-5'-P-CCNC: 30 U/L (ref 0–45)
ANION GAP SERPL CALCULATED.3IONS-SCNC: 7 MMOL/L (ref 5–18)
AST SERPL W P-5'-P-CCNC: 21 U/L (ref 0–40)
BILIRUB SERPL-MCNC: 1 MG/DL (ref 0–1)
BUN SERPL-MCNC: 14 MG/DL (ref 8–28)
CALCIUM SERPL-MCNC: 9.6 MG/DL (ref 8.5–10.5)
CHLORIDE BLD-SCNC: 99 MMOL/L (ref 98–107)
CHOLEST SERPL-MCNC: 155 MG/DL
CO2 SERPL-SCNC: 30 MMOL/L (ref 22–31)
CREAT SERPL-MCNC: 0.84 MG/DL (ref 0.6–1.1)
CREAT UR-MCNC: 25.2 MG/DL
FASTING STATUS PATIENT QL REPORTED: YES
GFR SERPL CREATININE-BSD FRML MDRD: >60 ML/MIN/1.73M2
GLUCOSE BLD-MCNC: 118 MG/DL (ref 70–125)
HBA1C MFR BLD: 6.4 % (ref 3.5–6)
HDLC SERPL-MCNC: 63 MG/DL
LDLC SERPL CALC-MCNC: 47 MG/DL
MICROALBUMIN UR-MCNC: <0.5 MG/DL (ref 0–1.99)
MICROALBUMIN/CREAT UR: NORMAL MG/G{CREAT}
POTASSIUM BLD-SCNC: 4 MMOL/L (ref 3.5–5)
PROT SERPL-MCNC: 6.3 G/DL (ref 6–8)
SODIUM SERPL-SCNC: 136 MMOL/L (ref 136–145)
TRIGL SERPL-MCNC: 224 MG/DL

## 2019-10-14 ASSESSMENT — ANXIETY QUESTIONNAIRES
5. BEING SO RESTLESS THAT IT IS HARD TO SIT STILL: NOT AT ALL
3. WORRYING TOO MUCH ABOUT DIFFERENT THINGS: NOT AT ALL
6. BECOMING EASILY ANNOYED OR IRRITABLE: NOT AT ALL
7. FEELING AFRAID AS IF SOMETHING AWFUL MIGHT HAPPEN: NOT AT ALL
4. TROUBLE RELAXING: NOT AT ALL
1. FEELING NERVOUS, ANXIOUS, OR ON EDGE: NOT AT ALL
GAD7 TOTAL SCORE: 1
2. NOT BEING ABLE TO STOP OR CONTROL WORRYING: SEVERAL DAYS

## 2019-10-14 ASSESSMENT — MIFFLIN-ST. JEOR: SCORE: 1118.6

## 2019-10-14 ASSESSMENT — PATIENT HEALTH QUESTIONNAIRE - PHQ9: SUM OF ALL RESPONSES TO PHQ QUESTIONS 1-9: 2

## 2019-10-18 ENCOUNTER — COMMUNICATION - HEALTHEAST (OUTPATIENT)
Dept: FAMILY MEDICINE | Facility: CLINIC | Age: 79
End: 2019-10-18

## 2019-10-18 DIAGNOSIS — R73.01 IMPAIRED FASTING GLUCOSE: ICD-10-CM

## 2019-11-07 ENCOUNTER — COMMUNICATION - HEALTHEAST (OUTPATIENT)
Dept: FAMILY MEDICINE | Facility: CLINIC | Age: 79
End: 2019-11-07

## 2019-11-07 DIAGNOSIS — G47.00 INSOMNIA, UNSPECIFIED TYPE: ICD-10-CM

## 2020-01-06 ENCOUNTER — COMMUNICATION - HEALTHEAST (OUTPATIENT)
Dept: FAMILY MEDICINE | Facility: CLINIC | Age: 80
End: 2020-01-06

## 2020-01-06 DIAGNOSIS — M19.049 OSTEOARTHROSIS, HAND: ICD-10-CM

## 2020-01-07 ENCOUNTER — COMMUNICATION - HEALTHEAST (OUTPATIENT)
Dept: FAMILY MEDICINE | Facility: CLINIC | Age: 80
End: 2020-01-07

## 2020-01-07 DIAGNOSIS — M19.049 OSTEOARTHROSIS, HAND: ICD-10-CM

## 2020-01-19 ENCOUNTER — COMMUNICATION - HEALTHEAST (OUTPATIENT)
Dept: FAMILY MEDICINE | Facility: CLINIC | Age: 80
End: 2020-01-19

## 2020-01-19 DIAGNOSIS — I10 ESSENTIAL HYPERTENSION, BENIGN: ICD-10-CM

## 2020-03-07 ENCOUNTER — COMMUNICATION - HEALTHEAST (OUTPATIENT)
Dept: FAMILY MEDICINE | Facility: CLINIC | Age: 80
End: 2020-03-07

## 2020-03-07 DIAGNOSIS — E78.5 HYPERLIPIDEMIA: ICD-10-CM

## 2020-03-25 ENCOUNTER — COMMUNICATION - HEALTHEAST (OUTPATIENT)
Dept: FAMILY MEDICINE | Facility: CLINIC | Age: 80
End: 2020-03-25

## 2020-03-25 DIAGNOSIS — J82.83 EOSINOPHILIC ASTHMA: ICD-10-CM

## 2020-03-25 DIAGNOSIS — R05.8 UPPER AIRWAY COUGH SYNDROME: ICD-10-CM

## 2020-03-26 ENCOUNTER — COMMUNICATION - HEALTHEAST (OUTPATIENT)
Dept: FAMILY MEDICINE | Facility: CLINIC | Age: 80
End: 2020-03-26

## 2020-03-26 DIAGNOSIS — G47.00 INSOMNIA, UNSPECIFIED TYPE: ICD-10-CM

## 2020-03-26 DIAGNOSIS — R51.9 HEADACHE: ICD-10-CM

## 2020-03-27 ENCOUNTER — AMBULATORY - HEALTHEAST (OUTPATIENT)
Dept: PULMONOLOGY | Facility: OTHER | Age: 80
End: 2020-03-27

## 2020-03-27 DIAGNOSIS — R06.2 WHEEZING: ICD-10-CM

## 2020-03-27 RX ORDER — ALBUTEROL SULFATE 90 UG/1
AEROSOL, METERED RESPIRATORY (INHALATION)
Qty: 1 INHALER | Refills: 11 | Status: SHIPPED | OUTPATIENT
Start: 2020-03-27 | End: 2021-09-24

## 2020-04-02 ENCOUNTER — OFFICE VISIT - HEALTHEAST (OUTPATIENT)
Dept: PULMONOLOGY | Facility: OTHER | Age: 80
End: 2020-04-02

## 2020-04-02 ENCOUNTER — AMBULATORY - HEALTHEAST (OUTPATIENT)
Dept: PULMONOLOGY | Facility: OTHER | Age: 80
End: 2020-04-02

## 2020-04-02 DIAGNOSIS — J45.20 MILD INTERMITTENT ASTHMA WITHOUT COMPLICATION: ICD-10-CM

## 2020-04-02 RX ORDER — ALBUTEROL SULFATE 0.83 MG/ML
2.5 SOLUTION RESPIRATORY (INHALATION) EVERY 6 HOURS PRN
Qty: 1 VIAL | Refills: 12 | Status: SHIPPED | OUTPATIENT
Start: 2020-04-02 | End: 2022-10-18

## 2020-04-02 ASSESSMENT — MIFFLIN-ST. JEOR: SCORE: 1075.96

## 2020-04-13 ENCOUNTER — COMMUNICATION - HEALTHEAST (OUTPATIENT)
Dept: FAMILY MEDICINE | Facility: CLINIC | Age: 80
End: 2020-04-13

## 2020-04-13 DIAGNOSIS — M19.042 OSTEOARTHRITIS OF BOTH HANDS, UNSPECIFIED OSTEOARTHRITIS TYPE: ICD-10-CM

## 2020-04-13 DIAGNOSIS — M19.041 OSTEOARTHRITIS OF BOTH HANDS, UNSPECIFIED OSTEOARTHRITIS TYPE: ICD-10-CM

## 2020-04-15 ENCOUNTER — COMMUNICATION - HEALTHEAST (OUTPATIENT)
Dept: FAMILY MEDICINE | Facility: CLINIC | Age: 80
End: 2020-04-15

## 2020-04-15 DIAGNOSIS — R73.01 IMPAIRED FASTING GLUCOSE: ICD-10-CM

## 2020-05-13 ENCOUNTER — RECORDS - HEALTHEAST (OUTPATIENT)
Dept: ADMINISTRATIVE | Facility: OTHER | Age: 80
End: 2020-05-13

## 2020-05-13 LAB — RETINOPATHY: NEGATIVE

## 2020-06-02 ENCOUNTER — COMMUNICATION - HEALTHEAST (OUTPATIENT)
Dept: FAMILY MEDICINE | Facility: CLINIC | Age: 80
End: 2020-06-02

## 2020-06-02 DIAGNOSIS — I10 ESSENTIAL HYPERTENSION, BENIGN: ICD-10-CM

## 2020-07-07 ENCOUNTER — COMMUNICATION - HEALTHEAST (OUTPATIENT)
Dept: FAMILY MEDICINE | Facility: CLINIC | Age: 80
End: 2020-07-07

## 2020-07-07 DIAGNOSIS — E78.5 HYPERLIPIDEMIA: ICD-10-CM

## 2020-07-31 ENCOUNTER — COMMUNICATION - HEALTHEAST (OUTPATIENT)
Dept: FAMILY MEDICINE | Facility: CLINIC | Age: 80
End: 2020-07-31

## 2020-07-31 DIAGNOSIS — M19.041 OSTEOARTHRITIS OF BOTH HANDS, UNSPECIFIED OSTEOARTHRITIS TYPE: ICD-10-CM

## 2020-07-31 DIAGNOSIS — M19.042 OSTEOARTHRITIS OF BOTH HANDS, UNSPECIFIED OSTEOARTHRITIS TYPE: ICD-10-CM

## 2020-08-06 ENCOUNTER — OFFICE VISIT - HEALTHEAST (OUTPATIENT)
Dept: FAMILY MEDICINE | Facility: CLINIC | Age: 80
End: 2020-08-06

## 2020-08-06 DIAGNOSIS — M19.049 OSTEOARTHRITIS OF HAND, UNSPECIFIED LATERALITY, UNSPECIFIED OSTEOARTHRITIS TYPE: ICD-10-CM

## 2020-08-06 DIAGNOSIS — N18.1 TYPE 2 DIABETES MELLITUS WITH STAGE 1 CHRONIC KIDNEY DISEASE, WITHOUT LONG-TERM CURRENT USE OF INSULIN (H): ICD-10-CM

## 2020-08-06 DIAGNOSIS — R51.9 NONINTRACTABLE HEADACHE, UNSPECIFIED CHRONICITY PATTERN, UNSPECIFIED HEADACHE TYPE: ICD-10-CM

## 2020-08-06 DIAGNOSIS — Z78.0 ASYMPTOMATIC POSTMENOPAUSAL STATUS: ICD-10-CM

## 2020-08-06 DIAGNOSIS — M19.042 OSTEOARTHRITIS OF BOTH HANDS, UNSPECIFIED OSTEOARTHRITIS TYPE: ICD-10-CM

## 2020-08-06 DIAGNOSIS — I10 ESSENTIAL HYPERTENSION, BENIGN: ICD-10-CM

## 2020-08-06 DIAGNOSIS — M19.041 OSTEOARTHRITIS OF BOTH HANDS, UNSPECIFIED OSTEOARTHRITIS TYPE: ICD-10-CM

## 2020-08-06 DIAGNOSIS — Z12.83 SKIN CANCER SCREENING: ICD-10-CM

## 2020-08-06 DIAGNOSIS — E78.5 HYPERLIPIDEMIA, UNSPECIFIED HYPERLIPIDEMIA TYPE: ICD-10-CM

## 2020-08-06 DIAGNOSIS — G47.00 INSOMNIA, UNSPECIFIED TYPE: ICD-10-CM

## 2020-08-06 DIAGNOSIS — J82.83 EOSINOPHILIC ASTHMA: ICD-10-CM

## 2020-08-06 DIAGNOSIS — E11.22 TYPE 2 DIABETES MELLITUS WITH STAGE 1 CHRONIC KIDNEY DISEASE, WITHOUT LONG-TERM CURRENT USE OF INSULIN (H): ICD-10-CM

## 2020-08-06 DIAGNOSIS — G62.9 PERIPHERAL POLYNEUROPATHY: ICD-10-CM

## 2020-08-06 DIAGNOSIS — Z00.01 ENCOUNTER FOR GENERAL ADULT MEDICAL EXAMINATION WITH ABNORMAL FINDINGS: ICD-10-CM

## 2020-08-06 LAB
ALBUMIN SERPL-MCNC: 4 G/DL (ref 3.5–5)
ALP SERPL-CCNC: 66 U/L (ref 45–120)
ALT SERPL W P-5'-P-CCNC: 27 U/L (ref 0–45)
ANION GAP SERPL CALCULATED.3IONS-SCNC: 10 MMOL/L (ref 5–18)
AST SERPL W P-5'-P-CCNC: 21 U/L (ref 0–40)
BASOPHILS # BLD AUTO: 0 THOU/UL (ref 0–0.2)
BASOPHILS NFR BLD AUTO: 1 % (ref 0–2)
BILIRUB SERPL-MCNC: 1.5 MG/DL (ref 0–1)
BUN SERPL-MCNC: 27 MG/DL (ref 8–28)
CALCIUM SERPL-MCNC: 9.2 MG/DL (ref 8.5–10.5)
CHLORIDE BLD-SCNC: 102 MMOL/L (ref 98–107)
CHOLEST SERPL-MCNC: 187 MG/DL
CO2 SERPL-SCNC: 26 MMOL/L (ref 22–31)
CREAT SERPL-MCNC: 1.01 MG/DL (ref 0.6–1.1)
CREAT UR-MCNC: 29.6 MG/DL
EOSINOPHIL # BLD AUTO: 0.6 THOU/UL (ref 0–0.4)
EOSINOPHIL NFR BLD AUTO: 9 % (ref 0–6)
ERYTHROCYTE [DISTWIDTH] IN BLOOD BY AUTOMATED COUNT: 11.8 % (ref 11–14.5)
FASTING STATUS PATIENT QL REPORTED: YES
GFR SERPL CREATININE-BSD FRML MDRD: 53 ML/MIN/1.73M2
GLUCOSE BLD-MCNC: 109 MG/DL (ref 70–125)
HBA1C MFR BLD: 6.4 %
HCT VFR BLD AUTO: 39.4 % (ref 35–47)
HDLC SERPL-MCNC: 62 MG/DL
HGB BLD-MCNC: 13.3 G/DL (ref 12–16)
LDLC SERPL CALC-MCNC: 74 MG/DL
LYMPHOCYTES # BLD AUTO: 1.9 THOU/UL (ref 0.8–4.4)
LYMPHOCYTES NFR BLD AUTO: 30 % (ref 20–40)
MCH RBC QN AUTO: 31.2 PG (ref 27–34)
MCHC RBC AUTO-ENTMCNC: 33.6 G/DL (ref 32–36)
MCV RBC AUTO: 93 FL (ref 80–100)
MICROALBUMIN UR-MCNC: <0.5 MG/DL (ref 0–1.99)
MICROALBUMIN/CREAT UR: NORMAL MG/G{CREAT}
MONOCYTES # BLD AUTO: 0.5 THOU/UL (ref 0–0.9)
MONOCYTES NFR BLD AUTO: 8 % (ref 2–10)
NEUTROPHILS # BLD AUTO: 3.4 THOU/UL (ref 2–7.7)
NEUTROPHILS NFR BLD AUTO: 53 % (ref 50–70)
PLATELET # BLD AUTO: 227 THOU/UL (ref 140–440)
PMV BLD AUTO: 7.8 FL (ref 7–10)
POTASSIUM BLD-SCNC: 4.1 MMOL/L (ref 3.5–5)
PROT SERPL-MCNC: 6.4 G/DL (ref 6–8)
RBC # BLD AUTO: 4.24 MILL/UL (ref 3.8–5.4)
SODIUM SERPL-SCNC: 138 MMOL/L (ref 136–145)
TRIGL SERPL-MCNC: 256 MG/DL
WBC: 6.3 THOU/UL (ref 4–11)

## 2020-08-06 RX ORDER — GABAPENTIN 300 MG/1
CAPSULE ORAL
Qty: 810 CAPSULE | Refills: 2 | Status: SHIPPED | OUTPATIENT
Start: 2020-08-06 | End: 2021-08-16

## 2020-08-06 ASSESSMENT — MIFFLIN-ST. JEOR: SCORE: 1065.76

## 2020-08-10 ENCOUNTER — COMMUNICATION - HEALTHEAST (OUTPATIENT)
Dept: FAMILY MEDICINE | Facility: CLINIC | Age: 80
End: 2020-08-10

## 2020-08-10 ENCOUNTER — RECORDS - HEALTHEAST (OUTPATIENT)
Dept: HEALTH INFORMATION MANAGEMENT | Facility: CLINIC | Age: 80
End: 2020-08-10

## 2020-08-25 ENCOUNTER — AMBULATORY - HEALTHEAST (OUTPATIENT)
Dept: SCHEDULING | Facility: CLINIC | Age: 80
End: 2020-08-25

## 2020-08-25 DIAGNOSIS — Z78.0 ASYMPTOMATIC POSTMENOPAUSAL STATUS: ICD-10-CM

## 2020-09-28 ENCOUNTER — RECORDS - HEALTHEAST (OUTPATIENT)
Dept: ADMINISTRATIVE | Facility: OTHER | Age: 80
End: 2020-09-28

## 2020-10-08 ENCOUNTER — COMMUNICATION - HEALTHEAST (OUTPATIENT)
Dept: FAMILY MEDICINE | Facility: CLINIC | Age: 80
End: 2020-10-08

## 2021-01-22 ENCOUNTER — COMMUNICATION - HEALTHEAST (OUTPATIENT)
Dept: FAMILY MEDICINE | Facility: CLINIC | Age: 81
End: 2021-01-22

## 2021-01-22 DIAGNOSIS — E11.22 TYPE 2 DIABETES MELLITUS WITH STAGE 1 CHRONIC KIDNEY DISEASE, WITHOUT LONG-TERM CURRENT USE OF INSULIN (H): ICD-10-CM

## 2021-01-22 DIAGNOSIS — N18.1 TYPE 2 DIABETES MELLITUS WITH STAGE 1 CHRONIC KIDNEY DISEASE, WITHOUT LONG-TERM CURRENT USE OF INSULIN (H): ICD-10-CM

## 2021-02-07 ENCOUNTER — COMMUNICATION - HEALTHEAST (OUTPATIENT)
Dept: FAMILY MEDICINE | Facility: CLINIC | Age: 81
End: 2021-02-07

## 2021-02-07 DIAGNOSIS — I10 ESSENTIAL HYPERTENSION, BENIGN: ICD-10-CM

## 2021-02-07 RX ORDER — LOSARTAN POTASSIUM 100 MG/1
100 TABLET ORAL DAILY
Qty: 90 TABLET | Refills: 1 | Status: SHIPPED | OUTPATIENT
Start: 2021-02-07 | End: 2021-07-29

## 2021-02-11 ENCOUNTER — OFFICE VISIT - HEALTHEAST (OUTPATIENT)
Dept: FAMILY MEDICINE | Facility: CLINIC | Age: 81
End: 2021-02-11

## 2021-02-11 DIAGNOSIS — N18.1 TYPE 2 DIABETES MELLITUS WITH STAGE 1 CHRONIC KIDNEY DISEASE, WITHOUT LONG-TERM CURRENT USE OF INSULIN (H): ICD-10-CM

## 2021-02-11 DIAGNOSIS — E78.5 HYPERLIPIDEMIA, UNSPECIFIED HYPERLIPIDEMIA TYPE: ICD-10-CM

## 2021-02-11 DIAGNOSIS — J82.83 EOSINOPHILIC ASTHMA: ICD-10-CM

## 2021-02-11 DIAGNOSIS — E11.22 TYPE 2 DIABETES MELLITUS WITH STAGE 1 CHRONIC KIDNEY DISEASE, WITHOUT LONG-TERM CURRENT USE OF INSULIN (H): ICD-10-CM

## 2021-02-11 DIAGNOSIS — M35.00 SICCA SYNDROME (H): ICD-10-CM

## 2021-02-11 DIAGNOSIS — I10 ESSENTIAL HYPERTENSION, BENIGN: ICD-10-CM

## 2021-02-11 LAB
ALBUMIN SERPL-MCNC: 4.1 G/DL (ref 3.5–5)
ALP SERPL-CCNC: 68 U/L (ref 45–120)
ALT SERPL W P-5'-P-CCNC: 24 U/L (ref 0–45)
ANION GAP SERPL CALCULATED.3IONS-SCNC: 9 MMOL/L (ref 5–18)
AST SERPL W P-5'-P-CCNC: 19 U/L (ref 0–40)
BASOPHILS # BLD AUTO: 0.1 THOU/UL (ref 0–0.2)
BASOPHILS NFR BLD AUTO: 1 % (ref 0–2)
BILIRUB SERPL-MCNC: 1.7 MG/DL (ref 0–1)
BUN SERPL-MCNC: 28 MG/DL (ref 8–28)
CALCIUM SERPL-MCNC: 9.2 MG/DL (ref 8.5–10.5)
CHLORIDE BLD-SCNC: 103 MMOL/L (ref 98–107)
CHOLEST SERPL-MCNC: 175 MG/DL
CO2 SERPL-SCNC: 27 MMOL/L (ref 22–31)
CREAT SERPL-MCNC: 1.03 MG/DL (ref 0.6–1.1)
CREAT UR-MCNC: 46.1 MG/DL
EOSINOPHIL # BLD AUTO: 0.4 THOU/UL (ref 0–0.4)
EOSINOPHIL NFR BLD AUTO: 7 % (ref 0–6)
ERYTHROCYTE [DISTWIDTH] IN BLOOD BY AUTOMATED COUNT: 12.3 % (ref 11–14.5)
FASTING STATUS PATIENT QL REPORTED: YES
GFR SERPL CREATININE-BSD FRML MDRD: 51 ML/MIN/1.73M2
GLUCOSE BLD-MCNC: 111 MG/DL (ref 70–125)
HBA1C MFR BLD: 6.3 %
HCT VFR BLD AUTO: 39.1 % (ref 35–47)
HDLC SERPL-MCNC: 57 MG/DL
HGB BLD-MCNC: 12.9 G/DL (ref 12–16)
IMM GRANULOCYTES # BLD: 0 THOU/UL
IMM GRANULOCYTES NFR BLD: 0 %
LDLC SERPL CALC-MCNC: 59 MG/DL
LYMPHOCYTES # BLD AUTO: 1.7 THOU/UL (ref 0.8–4.4)
LYMPHOCYTES NFR BLD AUTO: 25 % (ref 20–40)
MCH RBC QN AUTO: 30.9 PG (ref 27–34)
MCHC RBC AUTO-ENTMCNC: 33 G/DL (ref 32–36)
MCV RBC AUTO: 94 FL (ref 80–100)
MICROALBUMIN UR-MCNC: <0.5 MG/DL (ref 0–1.99)
MICROALBUMIN/CREAT UR: NORMAL MG/G{CREAT}
MONOCYTES # BLD AUTO: 0.5 THOU/UL (ref 0–0.9)
MONOCYTES NFR BLD AUTO: 8 % (ref 2–10)
NEUTROPHILS # BLD AUTO: 4 THOU/UL (ref 2–7.7)
NEUTROPHILS NFR BLD AUTO: 60 % (ref 50–70)
PLATELET # BLD AUTO: 244 THOU/UL (ref 140–440)
PMV BLD AUTO: 9.7 FL (ref 7–10)
POTASSIUM BLD-SCNC: 3.9 MMOL/L (ref 3.5–5)
PROT SERPL-MCNC: 6.4 G/DL (ref 6–8)
RBC # BLD AUTO: 4.18 MILL/UL (ref 3.8–5.4)
SODIUM SERPL-SCNC: 139 MMOL/L (ref 136–145)
TRIGL SERPL-MCNC: 295 MG/DL
WBC: 6.8 THOU/UL (ref 4–11)

## 2021-02-11 ASSESSMENT — MIFFLIN-ST. JEOR: SCORE: 1097.51

## 2021-04-01 ENCOUNTER — COMMUNICATION - HEALTHEAST (OUTPATIENT)
Dept: FAMILY MEDICINE | Facility: CLINIC | Age: 81
End: 2021-04-01

## 2021-04-01 DIAGNOSIS — I10 ESSENTIAL HYPERTENSION, BENIGN: ICD-10-CM

## 2021-04-01 DIAGNOSIS — R51.9 NONINTRACTABLE HEADACHE, UNSPECIFIED CHRONICITY PATTERN, UNSPECIFIED HEADACHE TYPE: ICD-10-CM

## 2021-04-01 DIAGNOSIS — G47.00 INSOMNIA, UNSPECIFIED TYPE: ICD-10-CM

## 2021-04-01 DIAGNOSIS — E78.5 HYPERLIPIDEMIA, UNSPECIFIED HYPERLIPIDEMIA TYPE: ICD-10-CM

## 2021-04-02 RX ORDER — ATORVASTATIN CALCIUM 40 MG/1
TABLET, FILM COATED ORAL
Qty: 90 TABLET | Refills: 3 | Status: SHIPPED | OUTPATIENT
Start: 2021-04-02 | End: 2022-03-21

## 2021-04-02 RX ORDER — HYDROCHLOROTHIAZIDE 25 MG/1
TABLET ORAL
Qty: 90 TABLET | Refills: 3 | Status: SHIPPED | OUTPATIENT
Start: 2021-04-02 | End: 2022-03-21

## 2021-04-06 ENCOUNTER — AMBULATORY - HEALTHEAST (OUTPATIENT)
Dept: PULMONOLOGY | Facility: OTHER | Age: 81
End: 2021-04-06

## 2021-04-07 ENCOUNTER — COMMUNICATION - HEALTHEAST (OUTPATIENT)
Dept: FAMILY MEDICINE | Facility: CLINIC | Age: 81
End: 2021-04-07

## 2021-04-13 ENCOUNTER — OFFICE VISIT - HEALTHEAST (OUTPATIENT)
Dept: PULMONOLOGY | Facility: OTHER | Age: 81
End: 2021-04-13

## 2021-04-13 DIAGNOSIS — J45.20 MILD INTERMITTENT ASTHMA WITHOUT COMPLICATION: ICD-10-CM

## 2021-04-13 RX ORDER — AZITHROMYCIN 250 MG/1
TABLET, FILM COATED ORAL
Qty: 6 TABLET | Refills: 3 | Status: SHIPPED | OUTPATIENT
Start: 2021-04-13 | End: 2022-02-21

## 2021-04-13 RX ORDER — PREDNISONE 20 MG/1
20 TABLET ORAL DAILY
Qty: 30 TABLET | Refills: 0 | Status: SHIPPED | OUTPATIENT
Start: 2021-04-13 | End: 2022-10-18

## 2021-04-28 ENCOUNTER — COMMUNICATION - HEALTHEAST (OUTPATIENT)
Dept: FAMILY MEDICINE | Facility: CLINIC | Age: 81
End: 2021-04-28

## 2021-04-28 DIAGNOSIS — M19.041 OSTEOARTHRITIS OF BOTH HANDS, UNSPECIFIED OSTEOARTHRITIS TYPE: ICD-10-CM

## 2021-04-28 DIAGNOSIS — G47.00 INSOMNIA, UNSPECIFIED TYPE: ICD-10-CM

## 2021-04-28 DIAGNOSIS — M19.042 OSTEOARTHRITIS OF BOTH HANDS, UNSPECIFIED OSTEOARTHRITIS TYPE: ICD-10-CM

## 2021-04-29 RX ORDER — MELOXICAM 15 MG/1
TABLET ORAL
Qty: 90 TABLET | Refills: 0 | Status: SHIPPED | OUTPATIENT
Start: 2021-04-29 | End: 2021-07-29

## 2021-04-29 RX ORDER — AMITRIPTYLINE HYDROCHLORIDE 10 MG/1
TABLET ORAL
Qty: 90 TABLET | Refills: 3 | Status: SHIPPED | OUTPATIENT
Start: 2021-04-29 | End: 2022-04-24

## 2021-05-24 ENCOUNTER — RECORDS - HEALTHEAST (OUTPATIENT)
Dept: ADMINISTRATIVE | Facility: CLINIC | Age: 81
End: 2021-05-24

## 2021-05-25 ENCOUNTER — RECORDS - HEALTHEAST (OUTPATIENT)
Dept: ADMINISTRATIVE | Facility: CLINIC | Age: 81
End: 2021-05-25

## 2021-05-26 ENCOUNTER — RECORDS - HEALTHEAST (OUTPATIENT)
Dept: ADMINISTRATIVE | Facility: CLINIC | Age: 81
End: 2021-05-26

## 2021-05-26 ASSESSMENT — PATIENT HEALTH QUESTIONNAIRE - PHQ9: SUM OF ALL RESPONSES TO PHQ QUESTIONS 1-9: 2

## 2021-05-27 ENCOUNTER — RECORDS - HEALTHEAST (OUTPATIENT)
Dept: ADMINISTRATIVE | Facility: CLINIC | Age: 81
End: 2021-05-27

## 2021-05-27 NOTE — PROGRESS NOTES
Assessment & Plan:  1. Type 2 diabetes mellitus with stage 1 chronic kidney disease, without long-term current use of insulin (H)  Patient diabetes still under good control.  Continue her current medications.  It has gone slightly up we talked about healthy eating habits.  Follow-up in 6 months for [place] check  - Glycosylated Hemoglobin A1c  - Comprehensive Metabolic Panel  - Lipid Cascade    2. 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  Mobic as needed.  Patient is also on gabapentin and amitriptyline  - meloxicam (MOBIC) 15 MG tablet; Take 1 tablet (15 mg total) by mouth daily.  Dispense: 90 tablet; Refill: 1    3. 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  She did not get her prednisone that they had ordered in March.  I will refill it today    4. Atypical nevus  Patient with an atypical nevus around her bra line.  Possible seborrheic keratosis but I do not like the erythematous base in the dark center.  Will refer her to dermatology for further evaluation  - Ambulatory referral to Dermatology        Orders Placed This Encounter   Procedures     Glycosylated Hemoglobin A1c     Comprehensive Metabolic Panel     Lipid Cascade     Order Specific Question:   Fasting is required?     Answer:   Yes     Ambulatory referral to Dermatology     Referral Priority:   Routine     Referral Type:   Consultation     Referral Reason:   Evaluation and Treatment     Requested Specialty:   Dermatology     Number of Visits Requested:   1     Medications Discontinued During This Encounter   Medication Reason     meloxicam (MOBIC) 15 MG tablet Reorder     predniSONE (DELTASONE) 20 MG tablet Reorder       Return in about 6 months (around 10/17/2019) for Recheck.          This note was created use Dragon Dictation.  There may be sound alike errors present.       Chief Complaint:    Chief Complaint   Patient presents with     Medication Management     go over all medicationn, fasting labs, no other concerns       History of Present Illness:  Wendy is a 78 y.o. female presenting to the clinic today for medication check and labs.  Patient has a history of diabetes, for which she takes metformin.  Her diabetes has usually been under very good control.  She checks her sugars once to twice daily.  She is having difficulty affording test strips.  [place]plane to her insurance companies will only cover once daily for testing.  I have asked her to test mornings and a couple times a week 2-hour postprandial.  She is going to check at [facility] as well as the [place] pharmacy about affordable options for test strips.  Otherwise diabetes is under good control    Patient has a history of osteoarthritis with significant pain in her hand.  She uses meloxicam as needed for the pain.  She also uses amitriptyline and gabapentin to help manage the pain.  She is tolerating those medications without difficulty and they do help her    Patient is followed by pulmonology for eosinophilic asthma.  Have her on albuterol as needed and treat flares as it comes up.  He also has prescribed her prednisone to have on hand.  He sent it over in March but she was unable to pick it up I will resend the prescription    Patient has an atypical mole along her bra line.  She feels is relatively new and is difficult for her to evaluate    Review of Systems:  All other systems are negative.     PFSH:  reviewed    Tobacco Use:  Social History     Tobacco Use   Smoking Status Former Smoker     Types: Cigarettes   Smokeless Tobacco Never Used   Tobacco Comment    quit 55 years ago, social only       Vitals:  Vitals:    04/17/19 0831   BP: 147/60   Patient Site: Left Arm   Patient Position: Sitting   Cuff Size: Adult Regular   Pulse: 83   Resp: 18   Temp: 97.3  F (36.3  C)   TempSrc: Oral   Weight: 155 lb 6.4 oz (70.5 kg)     Wt Readings from  Last 3 Encounters:   04/17/19 155 lb 6.4 oz (70.5 kg)   03/08/19 156 lb (70.8 kg)   01/24/19 155 lb 2 oz (70.4 kg)     Body mass index is 26.67 kg/m .      Physical Exam:  Constitutional:  Reveals an alert, cooperative,  female in no acute distress.  Vitals:  Per nursing notes.  Ears:  Clear.  Oropharynx:  Without posterior nasal drainage or thrush.  Neck:  Supple, no lymphadenopathy,  thyroid not palpable.  Cardiac:  Regular rate and rhythm without murmurs, rubs, or gallops.  Legs without edema.   Lungs: Clear.  Respiratory effort normal.  Skin:   Patient with raised lesion on the left side of her back just above her bra strap.  It is a central darker area and raised around the edges with an erythematous base.  Neurologic:  No gross focal deficits.    Psychiatric:  Mood appropriate, memory intact.     Data Reviewed:  Additional history summarized (from old records or history from someone other than the patient or another healthcare provider) (2 TOTAL): 2 reviewed pulmonology notes.     Decision to obtain extra information (old records requested or history from another person or accessing Care Everywhere) (1 TOTAL): None.     Radiology tests summarized or ordered (XRAY/CT/MRI/DXA) (1 TOTAL): None.    Labs reviewed or ordered (1 TOTAL): 1 labs ordered.    Medicine tests summarized or ordered (EKG/ECHO) (1 TOTAL): None.    Independent review of EKG or X-Ray (2 EACH): None.       The visit lasted a total of 30 minutes face to face with the patient. Over 50% of the time was spent counseling and educating the patient about diabetes.        Medications:  Current Outpatient Medications   Medication Sig Dispense Refill     albuterol (PROAIR HFA) 90 mcg/actuation inhaler INHALE 2 PUFFS BY INHALATION ROUTE EVERY 6 HOURS AS NEEDED FOR WHEEZING 1 Inhaler 11     amitriptyline (ELAVIL) 10 MG tablet Take 1 tablet (10 mg total) by mouth at bedtime. Take in addition to 25 mg tablet. 90 tablet 1     amitriptyline (ELAVIL) 25 MG  tablet Take 1 tablet (25 mg) by mouth nightly at bedtime 90 tablet 3     ascorbic acid (VITAMIN C) 1000 MG tablet Take 1,000 mg by mouth daily.       atorvastatin (LIPITOR) 40 MG tablet TAKE 1 TABLET (40 MG) BY MOUTH ONCE DAILY 90 tablet 3     blood glucose meter (GLUCOMETER) Use 1 each As Directed as needed. Dispense glucometer brand per patient's insurance at pharmacy discretion. Dx - Diabetes 1 each 0     blood glucose test (CONTOUR NEXT TEST STRIPS) strips USE 1 EACH AS DIRECTED 2 TIMES A DAY.. 200 strip 3     blood glucose test strips Use 1 each As Directed 2 (two) times a day Dispense brand per patient's insurance at pharmacy discretion.. 100 strip 5     blood glucose test strips Use 1 each As Directed as needed. Dispense brand per patient's insurance at pharmacy discretion. 100 strip 5     blood glucose test strips Check sugar twice daily.  Dispense Accu check which works with new meter - per patient's insurance at pharmacy discretion. DX DM E11.9 100 strip 5     blood pressure monitor Kit Check BP couple times a week.  Goal <140/90   DX - HTN and Diabetes 1 each 0     blood-glucose meter (ACCU-CHEK LI PLUS METER) Misc Check sugars 2 times daily. DX DM E11.9 1 each 0     cholecalciferol, vitamin D3, 1,000 unit tablet Take 1,000 Units by mouth daily.       fluticasone (FLONASE ALLERGY RELIEF) 50 mcg/actuation nasal spray 2 sprays each nostril daily. 16 g 3     FOLIC ACID/MULTIVITS-MIN/LUT (CENTRUM SILVER ORAL) Take by mouth.       gabapentin (NEURONTIN) 300 MG capsule take 3 capsules by mouth three times daily 810 capsule 2     generic lancets (MICROLET LANCET) Dispense brand per patient's insurance at pharmacy discretion. Check blood sugar twice daily. 100 each 5     hydroCHLOROthiazide (HYDRODIURIL) 25 MG tablet Take 1 tablet (25 mg total) by mouth daily. 90 tablet 0     losartan (COZAAR) 100 MG tablet Take 1 tablet (100 mg total) by mouth daily. 90 tablet 1     meloxicam (MOBIC) 15 MG tablet Take 1  tablet (15 mg total) by mouth daily. 90 tablet 1     metFORMIN (GLUCOPHAGE) 500 MG tablet TAKE 1 TABLET BY MOUTH 2 TIMES PER  tablet 2     omega-3 fatty acids 1,000 mg cap Take by mouth.       albuterol (PROVENTIL) 2.5 mg /3 mL (0.083 %) nebulizer solution Take 3 mL (2.5 mg total) by nebulization every 6 (six) hours as needed for wheezing. 380 mL 11     predniSONE (DELTASONE) 20 MG tablet Take 40 mg by mouth daily for 5 days. 90 tablet 0     VOLTAREN 1 % Gel APPLY TO UPPER EXTREMITIES, 2 GM OF GEL TO AFFECTED AREA 4 TIMES DAILY.  DO NOT APPLY MORE THAN 8 GM DAILY TO ANY ONE AFFECTED JOINT. 100 g 3     No current facility-administered medications for this visit.        Total Data Points:

## 2021-05-27 NOTE — PATIENT INSTRUCTIONS - HE
Follow - up with Dr Henson   In Sept 2020 - Dr Sonya Zhu      2-3 times a week - check sugars 2 hours after a meal.     Diabetes Code - Dr Lamberto Lopez

## 2021-05-27 NOTE — TELEPHONE ENCOUNTER
Patient was prescribed this by her pulmonologist.  She needs the 5 days for asthma exacerbations.  The pulmonologist sent her 90 tablets so she would have a supply at home to use as exacerbations came up

## 2021-05-27 NOTE — TELEPHONE ENCOUNTER
Medication Question or Clarification  Who is calling: Pharmacy: [facility]   What medication are you calling about? (include dose and sig) PredniSONE 20 mg tablet  Who prescribed the medication?: Dr. Wall   What is your question/concern?: Pharmacy received to many tablets and not sure what to do what the rest that were prescribed.   Pharmacy: [facility] in [place]  Okay to leave a detailed message?: Yes  Site CMT - Please call the pharmacy to obtain any additional needed information.

## 2021-05-28 ENCOUNTER — RECORDS - HEALTHEAST (OUTPATIENT)
Dept: ADMINISTRATIVE | Facility: CLINIC | Age: 81
End: 2021-05-28

## 2021-05-28 ASSESSMENT — ANXIETY QUESTIONNAIRES: GAD7 TOTAL SCORE: 1

## 2021-05-28 ASSESSMENT — ASTHMA QUESTIONNAIRES
ACT_TOTALSCORE: 25
ACT_TOTALSCORE: 25

## 2021-05-28 NOTE — PROGRESS NOTES
I met with Wendy Suárez at the request of Dr Wall to recheck her blood pressure.  Blood pressure medications on the MAR were reviewed with patient.    Patient has taken all medications as per usual regimen: Yes; taking Losartan 100 mg daily and HCTZ 25 mg daily - last OV BP was elevated   Patient reports tolerating them without any issues or concerns: Yes     Will be establishing care with Dr Henson - nothing scheduled at this time, plans to come in October for a fasting medication check.     Vitals:    04/25/19 1400   BP: 138/64   Patient Site: Left Arm   Patient Position: Sitting   Cuff Size: Adult Regular   Pulse: 80       Blood pressure was taken, previous encounter was reviewed, recorded blood pressure below 140/90.  Patient was discharged and the note will be sent to the provider for final review.

## 2021-05-28 NOTE — TELEPHONE ENCOUNTER
Former patient of Mae & has not established care with another provider.  Please assign refill request to covering provider per Clinic standard process.      Refill Approved    Rx renewed per Medication Renewal Policy. Medication was last renewed on 1/16/19.    Erin Talavera, [facility] Connection Triage/Med Refill 4/25/2019     Requested Prescriptions   Pending Prescriptions Disp Refills     hydroCHLOROthiazide (HYDRODIURIL) 25 MG tablet 90 tablet 0     Sig: Take 1 tablet (25 mg total) by mouth daily.       Diuretics/Combination Diuretics Refill Protocol  Passed - 4/24/2019 10:24 AM        Passed - Visit with PCP or prescribing provider visit in past 12 months     Last office visit with prescriber/PCP: 4/17/2019 Roxy Wall MD OR same dept: 4/17/2019 Roxy Wall MD OR same specialty: 4/17/2019 Roxy Wall MD  Last physical: 7/2/2015 Last MTM visit: Visit date not found   Next visit within 3 mo: Visit date not found  Next physical within 3 mo: Visit date not found  Prescriber OR PCP: Roxy Wall MD  Last diagnosis associated with med order: 1. Benign Essential Hypertension  - hydroCHLOROthiazide (HYDRODIURIL) 25 MG tablet; Take 1 tablet (25 mg total) by mouth daily.  Dispense: 90 tablet; Refill: 0    If protocol passes may refill for 12 months if within 3 months of last provider visit (or a total of 15 months).             Passed - Serum Potassium in past 12 months      Lab Results   Component Value Date    Potassium 3.7 04/17/2019             Passed - Serum Sodium in past 12 months      Lab Results   Component Value Date    Sodium 135 (L) 04/17/2019             Passed - Blood pressure on file in past 12 months     BP Readings from Last 1 Encounters:   04/17/19 147/60             Passed - Serum Creatinine in past 12 months      Creatinine   Date Value Ref Range Status   04/17/2019 0.91 0.60 - 1.10 mg/dL Final

## 2021-05-28 NOTE — TELEPHONE ENCOUNTER
FYI-Patient has a med check/Establish Care visit scheduled with Dr Henson on 10/14/19 at 10:10AM.

## 2021-05-28 NOTE — TELEPHONE ENCOUNTER
Refill Approved    Rx renewed per Medication Renewal Policy. Medication was last renewed on 8/4/2018 with 2 refills.  Last office visit: 4/17/2019 with PCP Dr ISHAN Wall:     Alexandra Mohamud, [facility] Connection Triage/Med Refill 4/27/2019     Requested Prescriptions   Pending Prescriptions Disp Refills     metFORMIN (GLUCOPHAGE) 500 MG tablet 180 tablet 2     Sig: TAKE 1 TABLET BY MOUTH 2 TIMES PER DAY       Metformin Refill Protocol Passed - 4/27/2019  6:36 PM        Passed - Blood pressure in last 12 months     BP Readings from Last 1 Encounters:   04/25/19 138/64             Passed - LFT or AST or ALT in last 12 months     Albumin   Date Value Ref Range Status   04/17/2019 3.8 3.5 - 5.0 g/dL Final     Bilirubin, Total   Date Value Ref Range Status   04/17/2019 0.9 0.0 - 1.0 mg/dL Final     Alkaline Phosphatase   Date Value Ref Range Status   04/17/2019 67 45 - 120 U/L Final     AST   Date Value Ref Range Status   04/17/2019 19 0 - 40 U/L Final     ALT   Date Value Ref Range Status   04/17/2019 29 0 - 45 U/L Final     Protein, Total   Date Value Ref Range Status   04/17/2019 6.2 6.0 - 8.0 g/dL Final                Passed - GFR or Serum Creatinine in last 6 months     GFR MDRD Non Af Amer   Date Value Ref Range Status   04/17/2019 60 (L) >60 mL/min/1.73m2 Final     GFR MDRD Af Amer   Date Value Ref Range Status   04/17/2019 >60 >60 mL/min/1.73m2 Final             Passed - Visit with PCP or prescribing provider visit in last 6 months or next 3 months     Last office visit with prescriber/PCP: 4/17/2019 OR same dept: 4/17/2019 Roxy Wall MD OR same specialty: 4/17/2019 Roxy Wall MD Last physical: Visit date not found Last MTM visit: Visit date not found         Next appt within 3 mo: Visit date not found  Next physical within 3 mo: Visit date not found  Prescriber OR PCP: Roxy Wall MD  Last diagnosis associated with med order: 1. Impaired fasting glucose  - metFORMIN (GLUCOPHAGE) 500 MG tablet; TAKE  1 TABLET BY MOUTH 2 TIMES PER DAY  Dispense: 180 tablet; Refill: 2     If protocol passes may refill for 12 months if within 3 months of last provider visit (or a total of 15 months).           Passed - A1C in last 6 months     Hemoglobin A1c   Date Value Ref Range Status   04/17/2019 6.8 (H) 3.5 - 6.0 % Final               Passed - Microalbumin in last year      Microalbumin, Random Urine   Date Value Ref Range Status   10/17/2018 <0.50 0.00 - 1.99 mg/dL Final

## 2021-05-29 NOTE — TELEPHONE ENCOUNTER
Refill Request  Did you contact pharmacy: No Pharmacy on the phone  Medication name: Glucose meter, test strips and lancets  Requested Prescriptions      No prescriptions requested or ordered in this encounter     Who prescribed the medication: Dr. Wall  Pharmacy Name and Location: [facility]  Is patient out of medication: Yes  Patient notified refills processed in 72 hours:  No pharmacy on the phone  Okay to leave a detailed message: yes  Patient is est care Dr. Highness garza.

## 2021-05-29 NOTE — TELEPHONE ENCOUNTER
Call pt to let know rx refilled . After reviewing dr medeiros's last night.diabetic control worsened slightly. Please make sure pt is checking blood sugars and if ranging higher than normal, we SHOULD see her back sooner than the 6 month follow up that was advised.

## 2021-05-29 NOTE — TELEPHONE ENCOUNTER
Pt notified at 9:53 am and given Dr Michelle's message and recommendations. Pt states she has a new meter and sugar levels seem to be better. Will monitor and make an appt sooner than 6 months if levels are higher than NL.  AUBRIE IniguezA

## 2021-05-29 NOTE — TELEPHONE ENCOUNTER
Medication Question or Clarification  Who is calling: Patient  What medication are you calling about? (include dose and sig)   gabapentin (NEURONTIN) 300 MG capsule 810 capsule 2 9/20/2018     Sig: take 3 capsules by mouth three times daily    Sent to pharmacy as: gabapentin (NEURONTIN) 300 MG capsule        Who prescribed the medication?: Roxy Wall MD  What is your question/concern?: I am scheduled with Dr. Henson in October for an established care visit but I need this medication filled now as I am almost out. please allow to fill this medication until appointment or advise if a sooner appointment is required.    Pharmacy: [facility] in [facility]  Okay to leave a detailed message?: Yes  Site CMT - Please call the pharmacy to obtain any additional needed information.

## 2021-05-30 VITALS — HEIGHT: 63 IN | WEIGHT: 147.5 LBS | BODY MASS INDEX: 26.13 KG/M2

## 2021-05-30 NOTE — TELEPHONE ENCOUNTER
Refill Approved    Rx renewed per Medication Renewal Policy. Medication was last renewed on 4/25/2019 for 90/0.  Last OV 4/17/2019  Olga Lidia Stiles, [facility] Connection Triage/Med Refill 7/23/2019     Requested Prescriptions   Pending Prescriptions Disp Refills     hydroCHLOROthiazide (HYDRODIURIL) 25 MG tablet [Pharmacy Med Name: hydroCHLOROthiazide Oral Tablet 25 MG] 90 tablet 0     Sig: Take 1 tablet (25 mg total) by mouth daily.       Diuretics/Combination Diuretics Refill Protocol  Passed - 7/22/2019  7:44 PM        Passed - Visit with PCP or prescribing provider visit in past 12 months     Last office visit with prescriber/PCP: Visit date not found OR same dept: 4/17/2019 Roxy Wall MD OR same specialty: 4/17/2019 Roxy Wall MD  Last physical: Visit date not found Last MTM visit: Visit date not found   Next visit within 3 mo: Visit date not found  Next physical within 3 mo: Visit date not found  Prescriber OR PCP: Laith Hyde MD  Last diagnosis associated with med order: 1. Benign Essential Hypertension  - hydroCHLOROthiazide (HYDRODIURIL) 25 MG tablet [Pharmacy Med Name: hydroCHLOROthiazide Oral Tablet 25 MG]; Take 1 tablet (25 mg total) by mouth daily.  Dispense: 90 tablet; Refill: 0    If protocol passes may refill for 12 months if within 3 months of last provider visit (or a total of 15 months).             Passed - Serum Potassium in past 12 months      Lab Results   Component Value Date    Potassium 3.7 04/17/2019             Passed - Serum Sodium in past 12 months      Lab Results   Component Value Date    Sodium 135 (L) 04/17/2019             Passed - Blood pressure on file in past 12 months     BP Readings from Last 1 Encounters:   04/25/19 138/64             Passed - Serum Creatinine in past 12 months      Creatinine   Date Value Ref Range Status   04/17/2019 0.91 0.60 - 1.10 mg/dL Final

## 2021-05-30 NOTE — TELEPHONE ENCOUNTER
Former patient of Mae & has not established care with another provider.  Please assign refill request to covering provider per Clinic standard process.      Refill Approved    Rx renewed per Medication Renewal Policy. Medication was last renewed on 1/24/19.    Erin Talavera, [facility] Connection Triage/Med Refill 7/17/2019     Requested Prescriptions   Pending Prescriptions Disp Refills     amitriptyline (ELAVIL) 10 MG tablet [Pharmacy Med Name: Amitriptyline HCl Oral Tablet 10 MG] 90 tablet 0     Sig: Take 1 tablet (10 mg) by mouth ONCE A DAYat bedtime. Take in addition to 25 mg tablet.       Tricyclics/Misc Antidepressant/Antianxiety Meds Refill Protocol Passed - 7/17/2019  7:01 AM        Passed - PCP or prescribing provider visit in last year     Last office visit with prescriber/PCP: 1/24/2019 Elle Tamez FNP OR same dept: 4/17/2019 Roxy Wall MD OR same specialty: 4/17/2019 Roxy Wall MD  Last physical: Visit date not found Last MTM visit: Visit date not found   Next visit within 3 mo: Visit date not found  Next physical within 3 mo: Visit date not found  Prescriber OR PCP: BERLIN Lr  Last diagnosis associated with med order: There are no diagnoses linked to this encounter.  If protocol passes may refill for 12 months if within 3 months of last provider visit (or a total of 15 months).                         
Please inform patient that she needs to establish care with a new provider at clinic.  
Scheduled est care appt on Monday, 10/14 with Dr. Henson.   
None

## 2021-05-31 VITALS — BODY MASS INDEX: 26.28 KG/M2 | WEIGHT: 153.1 LBS

## 2021-05-31 VITALS — BODY MASS INDEX: 26.19 KG/M2 | HEIGHT: 64 IN | WEIGHT: 153.4 LBS

## 2021-05-31 VITALS — WEIGHT: 153.2 LBS | BODY MASS INDEX: 27.14 KG/M2

## 2021-06-01 VITALS — WEIGHT: 153.5 LBS | BODY MASS INDEX: 26.35 KG/M2

## 2021-06-01 VITALS — WEIGHT: 151 LBS | HEIGHT: 64 IN | BODY MASS INDEX: 25.78 KG/M2

## 2021-06-01 VITALS — WEIGHT: 154.2 LBS | BODY MASS INDEX: 26.47 KG/M2

## 2021-06-01 VITALS — WEIGHT: 150 LBS | BODY MASS INDEX: 25.75 KG/M2

## 2021-06-01 VITALS — WEIGHT: 149 LBS | BODY MASS INDEX: 25.58 KG/M2

## 2021-06-01 VITALS — WEIGHT: 152.3 LBS | BODY MASS INDEX: 26.14 KG/M2

## 2021-06-01 NOTE — TELEPHONE ENCOUNTER
Refill Approved    Rx renewed per Medication Renewal Policy. Medication was last renewed on 3/12/19  #90 R-1.    Last OV 4/17/19    Erin Velazquez, [facility] Triage/Med Refill 9/9/2019     Requested Prescriptions   Pending Prescriptions Disp Refills     losartan (COZAAR) 100 MG tablet 90 tablet 1     Sig: Take 1 tablet (100 mg total) by mouth daily.       Angiotensin Receptor Blocker Protocol Passed - 9/9/2019  2:03 AM        Passed - PCP or prescribing provider visit in past 12 months       Last office visit with prescriber/PCP: 4/17/2019 Roxy Wall MD OR same dept: 4/17/2019 Roxy Wall MD OR same specialty: 4/17/2019 Roxy Wall MD  Last physical: Visit date not found Last MTM visit: Visit date not found   Next visit within 3 mo: Visit date not found  Next physical within 3 mo: Visit date not found  Prescriber OR PCP: Roxy Wall MD  Last diagnosis associated with med order: 1. Benign Essential Hypertension  - losartan (COZAAR) 100 MG tablet; Take 1 tablet (100 mg total) by mouth daily.  Dispense: 90 tablet; Refill: 1    If protocol passes may refill for 12 months if within 3 months of last provider visit (or a total of 15 months).             Passed - Serum potassium within the past 12 months     Lab Results   Component Value Date    Potassium 3.7 04/17/2019             Passed - Blood pressure filed in past 12 months     BP Readings from Last 1 Encounters:   04/25/19 138/64             Passed - Serum creatinine within the past 12 months     Creatinine   Date Value Ref Range Status   04/17/2019 0.91 0.60 - 1.10 mg/dL Final

## 2021-06-01 NOTE — PROGRESS NOTES
Pulmonary Clinic Follow-up Visit    Assessment and Plan:   Wendy Suárez is a 77 y.o. 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 Unfortunately, she was unable to tolerate 2 forms of ICS/LABA. She is doing very well, without any symptoms and is being maintained on PAULINO only.      Recommendations:  - Continue albuterol as needed  - she'll let us know if she has to use emergency prednisone and azithromycin for asthma action plan, has not used since last visit.  -She is UTD with PSV23 and PCV13. Needs flu shot - she'll get this from the drug store for insurance/cost purposes.  -encouraged her to exercise and remain active.    Follow up in 6 months (she tends to flare up in the winter so I'd like to see her after the winter to check in with her).    CCx: follow up of asthma with eosinophilic airway inflammation    HPI: Interim history: I last saw Wendy on 3/8/2019. She was doing well at that time. She was on PAULINO only. Previously she did not tolerate Symbicort or Breo.    Today she reports she's doing great. No cough, wheezing or shortness of breath. Has not had to use prednisone or azithromycin (action plan) since our last visit. She feels good knowing she has these medications available should she need them.  She remains active and takes care of her .     FENO trend: 115ppb Feb 2018  -> 43ppb April 2018 -> 60ppb March 2019. -> not checked today as she is doing very well without symptoms.     ROS:  A 12-system review was obtained and was negative with the exception of the symptoms endorsed in the history of present illness.    PMH:  Past Medical History:   Diagnosis Date     Asthma     high IgE, elevated FENO 115 highest     Cough      Hypertension        PSH:  Past Surgical History:   Procedure Laterality Date     HYSTERECTOMY Bilateral 1993     OOPHORECTOMY  Bilateral 1993       Allergies:  Allergies   Allergen Reactions     Lisinopril      cough     Sulfa (Sulfonamide Antibiotics)        Family HX:  Family History   Problem Relation Age of Onset     Diabetes Father      Hypertension Father      Heart disease Paternal Grandfather        Social Hx:  Social History     Socioeconomic History     Marital status:      Spouse name: Not on file     Number of children: Not on file     Years of education: Not on file     Highest education level: Not on file   Occupational History     Not on file   Social Needs     Financial resource strain: Not on file     Food insecurity:     Worry: Not on file     Inability: Not on file     Transportation needs:     Medical: Not on file     Non-medical: Not on file   Tobacco Use     Smoking status: Former Smoker     Types: Cigarettes     Smokeless tobacco: Never Used     Tobacco comment: quit 55 years ago, social only   Substance and Sexual Activity     Alcohol use: No     Drug use: Not on file     Sexual activity: Not on file   Lifestyle     Physical activity:     Days per week: Not on file     Minutes per session: Not on file     Stress: Not on file   Relationships     Social connections:     Talks on phone: Not on file     Gets together: Not on file     Attends Islam service: Not on file     Active member of club or organization: Not on file     Attends meetings of clubs or organizations: Not on file     Relationship status: Not on file     Intimate partner violence:     Fear of current or ex partner: Not on file     Emotionally abused: Not on file     Physically abused: Not on file     Forced sexual activity: Not on file   Other Topics Concern     Not on file   Social History Narrative     Not on file       Current Meds:  Current Outpatient Medications   Medication Sig Dispense Refill     albuterol (PROAIR HFA) 90 mcg/actuation inhaler INHALE 2 PUFFS BY INHALATION ROUTE EVERY 6 HOURS AS NEEDED FOR WHEEZING 1 Inhaler 11      albuterol (PROVENTIL) 2.5 mg /3 mL (0.083 %) nebulizer solution Take 3 mL (2.5 mg total) by nebulization every 6 (six) hours as needed for wheezing. 380 mL 11     amitriptyline (ELAVIL) 10 MG tablet Take 1 tablet (10 mg) by mouth ONCE A DAYat bedtime. Take in addition to 25 mg tablet. 90 tablet 0     amitriptyline (ELAVIL) 25 MG tablet Take 1 tablet (25 mg) by mouth nightly at bedtime 90 tablet 3     ascorbic acid (VITAMIN C) 1000 MG tablet Take 1,000 mg by mouth daily.       atorvastatin (LIPITOR) 40 MG tablet TAKE 1 TABLET (40 MG) BY MOUTH ONCE DAILY 90 tablet 3     blood glucose meter (GLUCOMETER) Use 1 each As Directed as needed. Dispense glucometer brand per patient's insurance at pharmacy discretion. Dx - Diabetes 1 each 0     blood glucose meter (GLUCOMETER) Use 1 each As Directed as needed. Dispense glucometer brand per patient's insurance at pharmacy discretion. 200 each 3     blood glucose test (CONTOUR NEXT TEST STRIPS) strips USE 1 EACH AS DIRECTED 2 TIMES A DAY.. 200 strip 3     blood glucose test strips Use 1 each As Directed 2 (two) times a day Dispense brand per patient's insurance at pharmacy discretion.. 100 strip 5     blood glucose test strips Use 1 each As Directed as needed. Dispense brand per patient's insurance at pharmacy discretion. 100 strip 5     blood glucose test strips Check sugar twice daily.  Dispense Accu check which works with new meter - per patient's insurance at pharmacy discretion. DX DM E11.9 100 strip 5     blood glucose test strips Use 1 each As Directed 2 (two) times a day. Dispense brand per patient's insurance at pharmacy discretion. 200 strip 3     blood pressure monitor Kit Check BP couple times a week.  Goal <140/90   DX - HTN and Diabetes 1 each 0     blood-glucose meter (ACCU-CHEK LI PLUS METER) Misc Check sugars 2 times daily. DX DM E11.9 1 each 0     cholecalciferol, vitamin D3, 1,000 unit tablet Take 1,000 Units by mouth daily.       fluticasone (FLONASE  ALLERGY RELIEF) 50 mcg/actuation nasal spray 2 sprays each nostril daily. 16 g 3     FOLIC ACID/MULTIVITS-MIN/LUT (CENTRUM SILVER ORAL) Take by mouth.       gabapentin (NEURONTIN) 300 MG capsule take 3 capsules by mouth three times daily 810 capsule 1     generic lancets (FINGERSTIX LANCETS) Use 1 each As Directed 2 (two) times a day. Dispense brand per patient's insurance at pharmacy discretion. 200 each 3     generic lancets (MICROLET LANCET) Dispense brand per patient's insurance at pharmacy discretion. Check blood sugar twice daily. 100 each 5     hydroCHLOROthiazide (HYDRODIURIL) 25 MG tablet Take 1 tablet (25 mg total) by mouth daily. 90 tablet 1     losartan (COZAAR) 100 MG tablet Take 1 tablet (100 mg total) by mouth daily. 90 tablet 2     meloxicam (MOBIC) 15 MG tablet Take 1 tablet (15 mg total) by mouth daily. 90 tablet 1     metFORMIN (GLUCOPHAGE) 500 MG tablet Take 1 tablet (500 mg total) by mouth 2 (two) times a day with meals. 180 tablet 3     omega-3 fatty acids 1,000 mg cap Take by mouth.       VOLTAREN 1 % Gel APPLY TO UPPER EXTREMITIES, 2 GM OF GEL TO AFFECTED AREA 4 TIMES DAILY.  DO NOT APPLY MORE THAN 8 GM DAILY TO ANY ONE AFFECTED JOINT. 100 g 3     No current facility-administered medications for this visit.        Physical Exam:  [facility] 09/[birth year]/1989   Gen: awake, alert, oriented, no distress  HEENT: nasal turbinates are unremarkable, no oropharyngeal lesions, no cervical or supraclavicular lymphadenopathy  CV: RRR, no M/G/R  Resp: CTAB, no focal crackles or wheezes  Abd: soft, nontender, no palpable organomegaly  Skin: no apparent rashes  Ext: no cyanosis, clubbing or edema  Neuro: alert, nonfocal    Labs:  IgE 260 Oct 2017  eos 7% in Oct 2017, Abs 700  [place] allergy panel was negative  ILD w/u neg 2014    Imaging studies:  reviewed    PFT's  Oct 2017  FEV1/FVC is 77 and is normal.  FEV1 is 84% predicted and is normal.  FVC is 83% predicted and normal.  There was no improvement in spirometry  after a single inhaled dose of bronchodilator.  TLC is 99% predicted and is normal.  RV is 123% predicted and is normal.  DLCO is 84% predicted and is normal when it   is corrected for hemoglobin.     Impression:  Full Pulmonary Function Test is normal.    Adalid Moreno MD (Avi)  [facility]  Pager [phone]  Clinic [phone]

## 2021-06-02 ENCOUNTER — RECORDS - HEALTHEAST (OUTPATIENT)
Dept: ADMINISTRATIVE | Facility: CLINIC | Age: 81
End: 2021-06-02

## 2021-06-02 VITALS — WEIGHT: 156 LBS | BODY MASS INDEX: 26.78 KG/M2

## 2021-06-02 VITALS — WEIGHT: 154 LBS | BODY MASS INDEX: 26.43 KG/M2

## 2021-06-02 VITALS — BODY MASS INDEX: 26.63 KG/M2 | WEIGHT: 155.13 LBS

## 2021-06-02 NOTE — TELEPHONE ENCOUNTER
----- Message from Tsering Henson MD sent at 10/17/2019 10:36 PM CDT -----  Please call patient:  Kidney liver function look good  Cholesterol look fine  I think we should decrease metformin from 1 tablet two times a day to just 1 tablet daily. Let me know if she agrees  See me in 6 months for follow up clinic visit    Please send result letter with above message

## 2021-06-02 NOTE — PROGRESS NOTES
ASSESSMENT/ PLAN        Wendy was seen today for establish care and medication management.    Establishing care with new doctor, encounter for  She is no longer needing Pap smear.  Mammogram has been normal so she is okay to stop doing mammogram.  Has had one colonoscopy many years ago and was normal and she does not want to get it again.  Gave her advanced directives today.  She is up-to-date with all immunization.    Type 2 diabetes mellitus with stage 1 chronic kidney disease, without long-term current use of insulin (H)  A1c is 6.4.  Fasting blood sugar in the 115's per patient's report.  I will update microalbumin urine and CMP today. When rest of lab is back will consider stopping metformin.   -     Glycosylated Hemoglobin A1c  -     Comprehensive Metabolic Panel  -     Microalbumin, Random Urine    Hyperlipidemia, unspecified hyperlipidemia type  -     Lipid Ismay FASTING    Essential hypertension  Well-controlled today.    Eosinophilic asthma (H)  Upper airway cough syndrome  Sees [facility] pulmonary.  Uses albuterol inhaler as needed.  Stable today.  Gave AAP.    Osteoarthrosis, hand  Uses gabapentin 903 times a day and Tylenol arthritis.  We will give her meloxicam however advised patient to use it sparingly.  Kidney lab checked today.  -     meloxicam (MOBIC) 15 MG tablet; Take 1 tablet (15 mg total) by mouth daily.    Sicca syndrome (H)  Previously saw a rheumatology.  Not bothersome to patient.    Chronic nonintractable headache, unspecified headache type  Was given amitriptyline 35 mg at nighttime.  No longer suffering from headache.    Carpal tunnel syndrome, unspecified laterality  David helps.    Tarsal tunnel syndrome, unspecified laterality  Has been using gabapentin.    Insomnia, unspecified type  Been taking amitriptyline 35 mg at nighttime and this helps her sleep.  Amitriptyline was originally prescribed for her headache which she no longer suffers from.      Greater than 45 minutes was  spent today with patient ,more than 50% of time was counseling and coordination of care on the above issues      This note was created using Dragon dictation software, spelling errors may occur.     Tsering Henson MD        SUBJECTIVE   Wendy Suárez is a 79 y.o. old female who presented to clinic today for further evaluation of establishing care as well as medication check.  She has a history of asthma, diabetes, hypertension, hyperlipidemia, osteoarthritis.  She is a patient previously with Dr. Wall.  She is fasting today.  She has no question or concern at this point.  She has a diagnosis of sicca syndrome.  Has been previously seen by rheumatologist last visit 2014, and has been receiving Voltaren gel for osteoarthritis.  She takes amitriptyline for migraines (but now for sleep) and gabapentin for tarsal tunnel syndrome however these are very drying and make her sick if symptoms worsen previously.  She is on chronic NSAID use [facility] so we will need lab work for this.  She has also seen pulmonary last visit 9/13/2019 for mild intermittent asthma and has been using albuterol as needed, she previously was not able to tolerate 2 forms of ICS-LABA (Symbicort caused her to lose her voice and Breo caused her to develop sores in her mouth), and is currently doing very well per pulmonary no without needing any controller inhaler.  She was also seen by allergy last visit 5/17/2018 and allergy testing was negative and did not think that she would need montelukast.    Would like to discuss whether she should take meloxicam daily. She ran out of this prescription a few months ago and has only been taking tylenol arthritis. She is very active, does strength training 3 times a week. Now she's collapsing and opening his wheelchair all the time which makes her arthritis a little bit worse. Her sleep has been affected.     Her  has been dealing with medical issues and is now in a wheelchair. She's been taking care of  "him.     Did have a colonoscopy many years ago and was completely normal and she doesn't want to do it again.     Fasting glucose is in he 110's.     Review of Systems:  Negative except as noted in HPI      The following portions of the patient's history were reviewed and updated as appropriate: past medical history, past surgical history, family history, allergies, current medications and problem list.    Medical History  Active Ambulatory (Non-Hospital) Problems    Diagnosis     Eosinophilic asthma (H)     Elevated IgE level     Asthma     Upper airway cough syndrome     Essential hypertension     Sicca syndrome (H)     Insomnia, unspecified type     Type II diabetes mellitus (H)     Unspecified cataract     Cough     Osteoarthritis Of The Hand     Carpal Tunnel Syndrome     Menopause     Headache     Tarsal Tunnel Syndrome     Bronchospasm     Hyperlipidemia     Benign Essential Hypertension     Past Medical History:   Diagnosis Date     Asthma      Cough      Hypertension        Surgical History  She  has a past surgical history that includes Hysterectomy (Bilateral, 1993) and Oophorectomy (Bilateral, 1993).    Social History  Reviewed, and she  reports that she has quit smoking. Her smoking use included cigarettes. She has never used smokeless tobacco. She reports that she does not drink alcohol.    Family History  Reviewed, and family history includes Diabetes in her father; Heart disease in her paternal grandfather; Hypertension in her father.    Medications  Reviewed and reconciled    Allergies  Allergies   Allergen Reactions     Lisinopril      cough     Sulfa (Sulfonamide Antibiotics)          OBJECTIVE  Physical Exam:  Vital signs: /64 (Patient Site: Left Arm, Patient Position: Sitting, Cuff Size: Adult Regular)   Pulse 78   Ht 5' 4\" (1.626 m)   Wt 147 lb 6.4 oz (66.9 kg)   LMP 09/08/1989   SpO2 95%   BMI 25.30 kg/m    Weight: 147 lb 6.4 oz (66.9 kg)    General appearance: pleasant, appears " stated age, cooperative and in no distress  Eyes: EOMs intact, PERRL, conjunctivae normal.   ENT: moist oral mucosa, posterior oropharynx normal, TMs normal. Thyroid normal to palpation, no lump or mass or tenderness, no carotid bruit or JVD appreciated.   Lymph: no cervical/supraclavicular adenopathy  Respiratory: clear to auscultation bilaterally, good air movement throughout, no wheezing or crackles, speaking full sentences without difficulty  Cardiovascular: regular rate and rhythm, no murmur appreciated, no leg edema  Abdomen: active bowel sounds, soft, non-tender, non-distended  Musculoskeletal: warm and well perfused, strong and symmetric dorsalis pedal pulses, strength 5/5 and equal bilaterally  FEET:  MF TESTING: abnormal up to bilateral knees.               SKIN EXAM:  NL              VASCULAR:   R DP  PULSE: +                                      L DP  PULSE: +                                      R PT  PULSE: +                                      L PT  PULSE: +    Skin: no rashes  Neuro: alert oriented x 3, grossly normal otherwise  Psych: normal affect, appropriate conversation

## 2021-06-03 ENCOUNTER — RECORDS - HEALTHEAST (OUTPATIENT)
Dept: ADMINISTRATIVE | Facility: CLINIC | Age: 81
End: 2021-06-03

## 2021-06-03 VITALS — BODY MASS INDEX: 26.67 KG/M2 | WEIGHT: 155.4 LBS

## 2021-06-03 VITALS
SYSTOLIC BLOOD PRESSURE: 130 MMHG | HEART RATE: 78 BPM | WEIGHT: 147.4 LBS | OXYGEN SATURATION: 95 % | DIASTOLIC BLOOD PRESSURE: 64 MMHG | HEIGHT: 64 IN | BODY MASS INDEX: 25.16 KG/M2

## 2021-06-03 VITALS
SYSTOLIC BLOOD PRESSURE: 108 MMHG | HEART RATE: 84 BPM | RESPIRATION RATE: 16 BRPM | BODY MASS INDEX: 25.61 KG/M2 | DIASTOLIC BLOOD PRESSURE: 60 MMHG | OXYGEN SATURATION: 96 % | WEIGHT: 149.2 LBS

## 2021-06-03 NOTE — TELEPHONE ENCOUNTER
Refill Approved    Rx renewed per Medication Renewal Policy. Medication was last renewed on 7/17/19.    Neena Bar, [facility] Connection Triage/Med Refill 11/7/2019     Requested Prescriptions   Pending Prescriptions Disp Refills     amitriptyline (ELAVIL) 10 MG tablet [Pharmacy Med Name: Amitriptyline HCl Oral Tablet 10 MG] 90 tablet 0     Sig: TAKE 1 TABLET BY MOUTH ONCE DAILY AT BEDTIME.  TAKE IN ADDITION TO 25 MG TABLET.       Tricyclics/Misc Antidepressant/Antianxiety Meds Refill Protocol Passed - 11/7/2019  7:14 AM        Passed - PCP or prescribing provider visit in last year     Last office visit with prescriber/PCP: Visit date not found OR same dept: 10/14/2019 Tsering Henson MD OR same specialty: 10/14/2019 Tsering Henson MD  Last physical: Visit date not found Last MTM visit: Visit date not found   Next visit within 3 mo: Visit date not found  Next physical within 3 mo: Visit date not found  Prescriber OR PCP: Laith Hyde MD  Last diagnosis associated with med order: 1. Insomnia, unspecified type  - amitriptyline (ELAVIL) 10 MG tablet [Pharmacy Med Name: Amitriptyline HCl Oral Tablet 10 MG]; TAKE 1 TABLET BY MOUTH ONCE DAILY AT BEDTIME.  TAKE IN ADDITION TO 25 MG TABLET.  Dispense: 90 tablet; Refill: 0    If protocol passes may refill for 12 months if within 3 months of last provider visit (or a total of 15 months).

## 2021-06-04 VITALS
OXYGEN SATURATION: 95 % | HEIGHT: 63 IN | HEART RATE: 75 BPM | BODY MASS INDEX: 24.98 KG/M2 | TEMPERATURE: 98.3 F | DIASTOLIC BLOOD PRESSURE: 50 MMHG | SYSTOLIC BLOOD PRESSURE: 116 MMHG | WEIGHT: 141 LBS

## 2021-06-04 VITALS — BODY MASS INDEX: 23.56 KG/M2 | WEIGHT: 138 LBS | HEIGHT: 64 IN

## 2021-06-05 VITALS
HEART RATE: 87 BPM | SYSTOLIC BLOOD PRESSURE: 122 MMHG | WEIGHT: 149.2 LBS | BODY MASS INDEX: 26.85 KG/M2 | OXYGEN SATURATION: 97 % | DIASTOLIC BLOOD PRESSURE: 60 MMHG

## 2021-06-05 VITALS
HEIGHT: 63 IN | SYSTOLIC BLOOD PRESSURE: 125 MMHG | WEIGHT: 148 LBS | BODY MASS INDEX: 26.22 KG/M2 | OXYGEN SATURATION: 95 % | HEART RATE: 78 BPM | DIASTOLIC BLOOD PRESSURE: 59 MMHG

## 2021-06-05 NOTE — TELEPHONE ENCOUNTER
Refill Approved    Rx renewed per Medication Renewal Policy. Medication was last renewed on 6/10/19.    Erin Talavera, [facility] Connection Triage/Med Refill 1/7/2020     Requested Prescriptions   Pending Prescriptions Disp Refills     gabapentin (NEURONTIN) 300 MG capsule [Pharmacy Med Name: Gabapentin Oral Capsule 300 MG] 810 capsule 0     Sig: take 3 capsules by mouth three times daily       Gabapentin/Levetiracetam/Tiagabine Refill Protocol  Passed - 1/6/2020 10:30 PM        Passed - PCP or prescribing provider visit in past 12 months or next 3 months     Last office visit with prescriber/PCP: Visit date not found OR same dept: 10/14/2019 Tsering Henson MD OR same specialty: 10/14/2019 Tsering Henson MD  Last physical: Visit date not found Last MTM visit: Visit date not found   Next visit within 3 mo: Visit date not found  Next physical within 3 mo: Visit date not found  Prescriber OR PCP: Petra Michelle DO  Last diagnosis associated with med order: 1. Osteoarthrosis, hand  - gabapentin (NEURONTIN) 300 MG capsule [Pharmacy Med Name: Gabapentin Oral Capsule 300 MG]; take 3 capsules by mouth three times daily  Dispense: 810 capsule; Refill: 0    If protocol passes may refill for 12 months if within 3 months of last provider visit (or a total of 15 months).

## 2021-06-05 NOTE — TELEPHONE ENCOUNTER
Refill Approved    Rx renewed per Medication Renewal Policy. Medication was last renewed on 7/23/19.    Neena Bar, [facility] Connection Triage/Med Refill 1/19/2020     Requested Prescriptions   Pending Prescriptions Disp Refills     hydroCHLOROthiazide (HYDRODIURIL) 25 MG tablet 90 tablet 1     Sig: Take 1 tablet (25 mg total) by mouth daily.       Diuretics/Combination Diuretics Refill Protocol  Passed - 1/19/2020  4:25 PM        Passed - Visit with PCP or prescribing provider visit in past 12 months     Last office visit with prescriber/PCP: 10/14/2019 Tsering Henson MD OR same dept: 10/14/2019 Tsering Henson MD OR same specialty: 10/14/2019 Tsering Henson MD  Last physical: Visit date not found Last MTM visit: Visit date not found   Next visit within 3 mo: Visit date not found  Next physical within 3 mo: Visit date not found  Prescriber OR PCP: Tsering Henson MD  Last diagnosis associated with med order: 1. Benign Essential Hypertension  - hydroCHLOROthiazide (HYDRODIURIL) 25 MG tablet; Take 1 tablet (25 mg total) by mouth daily.  Dispense: 90 tablet; Refill: 1    If protocol passes may refill for 12 months if within 3 months of last provider visit (or a total of 15 months).             Passed - Serum Potassium in past 12 months      Lab Results   Component Value Date    Potassium 4.0 10/14/2019             Passed - Serum Sodium in past 12 months      Lab Results   Component Value Date    Sodium 136 10/14/2019             Passed - Blood pressure on file in past 12 months     BP Readings from Last 1 Encounters:   10/14/19 130/64             Passed - Serum Creatinine in past 12 months      Creatinine   Date Value Ref Range Status   10/14/2019 0.84 0.60 - 1.10 mg/dL Final

## 2021-06-06 NOTE — TELEPHONE ENCOUNTER
Refill Approved    Rx renewed per Medication Renewal Policy. Medication was last renewed on 3/16/19.    Ailyn Ortiz, [facility] Connection Triage/Med Refill 3/10/2020     Requested Prescriptions   Pending Prescriptions Disp Refills     atorvastatin (LIPITOR) 40 MG tablet 90 tablet 3       Statins Refill Protocol (Hmg CoA Reductase Inhibitors) Passed - 3/7/2020  6:29 PM        Passed - PCP or prescribing provider visit in past 12 months      Last office visit with prescriber/PCP: 10/14/2019 Tsering Henson MD OR same dept: 10/14/2019 Tsering Henson MD OR same specialty: 10/14/2019 Tsering Henson MD  Last physical: Visit date not found Last MTM visit: Visit date not found   Next visit within 3 mo: Visit date not found  Next physical within 3 mo: Visit date not found  Prescriber OR PCP: Tsering Henson MD  Last diagnosis associated with med order: 1. Hyperlipidemia  - atorvastatin (LIPITOR) 40 MG tablet  Dispense: 90 tablet; Refill: 3    If protocol passes may refill for 12 months if within 3 months of last provider visit (or a total of 15 months).

## 2021-06-07 NOTE — TELEPHONE ENCOUNTER
RN cannot approve Refill Request    RN can NOT refill this medication Protocol failed and NO refill given.       Erin Talavera, [facility] Connection Triage/Med Refill 4/15/2020    Requested Prescriptions   Pending Prescriptions Disp Refills     metFORMIN (GLUCOPHAGE) 500 MG tablet [Pharmacy Med Name: metFORMIN HCl Oral Tablet 500 MG] 90 tablet 3     Sig: Take 1 tablet (500 mg total) by mouth daily with breakfast.       Metformin Refill Protocol Failed - 4/15/2020  7:01 AM        Failed - Visit with PCP or prescribing provider visit in last 6 months or next 3 months     Last office visit with prescriber/PCP: Visit date not found OR same dept: 10/14/2019 Tsering Henson MD OR same specialty: 10/14/2019 Tsering Henson MD Last physical: Visit date not found Last MTM visit: Visit date not found         Next appt within 3 mo: Visit date not found  Next physical within 3 mo: Visit date not found  Prescriber OR PCP: Tsering Henson MD  Last diagnosis associated with med order: 1. Impaired fasting glucose  - metFORMIN (GLUCOPHAGE) 500 MG tablet [Pharmacy Med Name: metFORMIN HCl Oral Tablet 500 MG]; Take 1 tablet (500 mg total) by mouth daily with breakfast.  Dispense: 90 tablet; Refill: 0     If protocol passes may refill for 12 months if within 3 months of last provider visit (or a total of 15 months).           Failed - A1C in last 6 months     Hemoglobin A1c   Date Value Ref Range Status   10/14/2019 6.4 (H) 3.5 - 6.0 % Final               Passed - Blood pressure in last 12 months     BP Readings from Last 1 Encounters:   10/14/19 130/64             Passed - LFT or AST or ALT in last 12 months     Albumin   Date Value Ref Range Status   10/14/2019 3.9 3.5 - 5.0 g/dL Final     Bilirubin, Total   Date Value Ref Range Status   10/14/2019 1.0 0.0 - 1.0 mg/dL Final     Alkaline Phosphatase   Date Value Ref Range Status   10/14/2019 70 45 - 120 U/L Final     AST   Date Value Ref Range Status   10/14/2019 21 0 - 40 U/L Final      ALT   Date Value Ref Range Status   10/14/2019 30 0 - 45 U/L Final     Protein, Total   Date Value Ref Range Status   10/14/2019 6.3 6.0 - 8.0 g/dL Final                Passed - GFR or Serum Creatinine in last 6 months     GFR MDRD Non Af Amer   Date Value Ref Range Status   10/14/2019 >60 >60 mL/min/1.73m2 Final     GFR MDRD Af Amer   Date Value Ref Range Status   10/14/2019 >60 >60 mL/min/1.73m2 Final             Passed - Microalbumin in last year      Microalbumin, Random Urine   Date Value Ref Range Status   10/14/2019 <0.50 0.00 - 1.99 mg/dL Final

## 2021-06-07 NOTE — PROGRESS NOTES
"Wendy Suárez is a 79 y.o. female who is being evaluated via a billable telephone visit.      The patient has been notified of following:     \"This telephone visit will be conducted via a call between you and your physician/provider. We have found that certain health care needs can be provided without the need for a physical exam.  This service lets us provide the care you need with a short phone conversation.  If a prescription is necessary we can send it directly to your pharmacy.  If lab work is needed we can place an order for that and you can then stop by our lab to have the test done at a later time.    If during the course of the call the physician/provider feels a telephone visit is not appropriate, you will not be charged for this service.\"     Patient has given verbal consent to a Telephone visit? Yes    Wendy Suárez complains of  No chief complaint on file.      I have reviewed and updated the patient's Past Medical History, Social History, Family History and Medication List.    ALLERGIES  Lisinopril and Sulfa (sulfonamide antibiotics)    Additional provider notes:   I last saw Wendy on 9/13/2019. Since that time, she has been doing very well.  No recent colds or illnesses. No ER visits or hospitalizations.   She's been staying home.   Rare use of rescue inhaler.  Has not had to use her action plan.     FENO trend: 115ppb Feb 2018  -> 43ppb April 2018 -> 60ppb March 2019.    Assessment and Plan:   Wendy Suárez is a 77 y.o. 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 Unfortunately, she was unable to tolerate 2 forms of ICS/LABA. She is doing remarkably well without any recent flare ups.      Recommendations:  - Continue albuterol inhaler/neb as needed  - Refilled prednisone and azithromycin for her asthma action plan - she hasn't needed to use these at all " this past winter.  -She is UTD flu and pneumococcal vaccinations.  -encouraged her to exercise and remain active.     Follow up in 1 year.    Phone call duration: 10 minutes    Adalid Moreno MD (Avi)  [facility]  Pager [phone]  Clinic [phone]

## 2021-06-07 NOTE — TELEPHONE ENCOUNTER
Refill Approved    Rx renewed per Medication Renewal Policy. Medication was last renewed on 1/19/19.    Erin Talavera, [facility] Triage/Med Refill 3/27/2020     Requested Prescriptions   Pending Prescriptions Disp Refills     amitriptyline (ELAVIL) 25 MG tablet 90 tablet 3       Tricyclics/Misc Antidepressant/Antianxiety Meds Refill Protocol Passed - 3/26/2020 10:37 AM        Passed - PCP or prescribing provider visit in last year     Last office visit with prescriber/PCP: 10/14/2019 Tsering Henson MD OR same dept: 10/14/2019 Tsering Henson MD OR same specialty: 10/14/2019 Tsering Henson MD  Last physical: Visit date not found Last MTM visit: Visit date not found   Next visit within 3 mo: Visit date not found  Next physical within 3 mo: Visit date not found  Prescriber OR PCP: Tsering Henson MD  Last diagnosis associated with med order: 1. Headache  - amitriptyline (ELAVIL) 25 MG tablet  Dispense: 90 tablet; Refill: 3    2. Insomnia, unspecified type  - amitriptyline (ELAVIL) 25 MG tablet  Dispense: 90 tablet; Refill: 3    If protocol passes may refill for 12 months if within 3 months of last provider visit (or a total of 15 months).

## 2021-06-07 NOTE — TELEPHONE ENCOUNTER
RN cannot approve Refill Request    RN can NOT refill this medication med is not covered by policy/route to provider     . Last office visit: 10/14/2019 Tsering Henson MD Last Physical: Visit date not found Last MTM visit: Visit date not found Last visit same specialty: 10/14/2019 Tsering Henson MD.  Next visit within 3 mo: Visit date not found  Next physical within 3 mo: Visit date not found      Erin Talavera, [facility] Triage/Med Refill 4/13/2020    Requested Prescriptions   Pending Prescriptions Disp Refills     meloxicam (MOBIC) 15 MG tablet [Pharmacy Med Name: Meloxicam Oral Tablet 15 MG] 90 tablet 0     Sig: Take 1 tablet (15 mg) by mouth once daily       There is no refill protocol information for this order

## 2021-06-08 NOTE — TELEPHONE ENCOUNTER
Refill Approved    Rx renewed per Medication Renewal Policy. Medication was last renewed on 9/9/19.    Erin Talavera, [facility] Triage/Med Refill 6/4/2020     Requested Prescriptions   Pending Prescriptions Disp Refills     losartan (COZAAR) 100 MG tablet 90 tablet 2     Sig: Take 1 tablet (100 mg total) by mouth daily.       Angiotensin Receptor Blocker Protocol Passed - 6/2/2020  4:22 PM        Passed - PCP or prescribing provider visit in past 12 months       Last office visit with prescriber/PCP: 10/14/2019 Tsering Henson MD OR same dept: 10/14/2019 Tsering Henson MD OR same specialty: 10/14/2019 Tsering Henson MD  Last physical: Visit date not found Last MTM visit: Visit date not found   Next visit within 3 mo: Visit date not found  Next physical within 3 mo: Visit date not found  Prescriber OR PCP: Tsering Henson MD  Last diagnosis associated with med order: 1. Benign Essential Hypertension  - losartan (COZAAR) 100 MG tablet; Take 1 tablet (100 mg total) by mouth daily.  Dispense: 90 tablet; Refill: 2    If protocol passes may refill for 12 months if within 3 months of last provider visit (or a total of 15 months).             Passed - Serum potassium within the past 12 months     Lab Results   Component Value Date    Potassium 4.0 10/14/2019             Passed - Blood pressure filed in past 12 months     BP Readings from Last 1 Encounters:   10/14/19 130/64             Passed - Serum creatinine within the past 12 months     Creatinine   Date Value Ref Range Status   10/14/2019 0.84 0.60 - 1.10 mg/dL Final

## 2021-06-09 NOTE — TELEPHONE ENCOUNTER
Refill Approved    Rx renewed per Medication Renewal Policy. Medication was last renewed on 3/11/2020.    Ov: 10/14/19    Fannie Caballero, [facility] Triage/Med Refill 7/10/2020     Requested Prescriptions   Pending Prescriptions Disp Refills     atorvastatin (LIPITOR) 40 MG tablet [Pharmacy Med Name: Atorvastatin Calcium Oral Tablet 40 MG] 90 tablet 0     Sig: Take 1 tablet (40 mg) by mouth ONCE daily       Statins Refill Protocol (Hmg CoA Reductase Inhibitors) Passed - 7/7/2020  8:34 PM        Passed - PCP or prescribing provider visit in past 12 months      Last office visit with prescriber/PCP: 10/14/2019 Tsering Henson MD OR same dept: 10/14/2019 Tsering Henson MD OR same specialty: 10/14/2019 Tsering Henson MD  Last physical: Visit date not found Last MTM visit: Visit date not found   Next visit within 3 mo: Visit date not found  Next physical within 3 mo: Visit date not found  Prescriber OR PCP: Tsering Henson MD  Last diagnosis associated with med order: 1. Hyperlipidemia  - atorvastatin (LIPITOR) 40 MG tablet [Pharmacy Med Name: Atorvastatin Calcium Oral Tablet 40 MG]; Take 1 tablet (40 mg) by mouth ONCE daily  Dispense: 90 tablet; Refill: 0    If protocol passes may refill for 12 months if within 3 months of last provider visit (or a total of 15 months).

## 2021-06-09 NOTE — PROGRESS NOTES
Assessment & Plan:  1. Type II diabetes mellitus  Pt with DM - well controlled with diet and metformin.  Encouraged patient to continue healthy lifestyle.  Due for labs today - recheck in 6 months  - Comprehensive Metabolic Panel  - Lipid Cascade    2. Type 2 diabetes mellitus without complication  See above  - Glycosylated Hemoglobin A1C  - Comprehensive Metabolic Panel  - Lipid Cascade    3. Cough  Pt with persistant cough and wheezing.  CXR normal - most likely brochospams.  Will give a short course of prednisone to see if helps sx.  Also rial with albuterol inhaler.    - XR Chest PA and Lateral; Future    4. Osteoarthrosis, hand  OA of both hands.  Recommend continuing current treatment.  Ok to use mobic daily - cautioned her on GI side effects.  If she uses Aleve - do not use the mobic.  Discussed referral to hand specialist - she declines    5. Benign Essential Hypertension  HTN well controlled - continue losartan - stop diuretics she is having some side effects of med.  Will watch her BP - and adjust if needed.      Orders Placed This Encounter   Procedures     XR Chest PA and Lateral     Standing Status:   Future     Number of Occurrences:   1     Standing Expiration Date:   3/9/2018     Order Specific Question:   Reason for Exam (Describe Symptoms):     Answer:   Cough and end inspiratoy wheeze on right.  Eval for any infiltrate     Order Specific Question:   Can the procedure be changed per Radiologist protocol?     Answer:   Yes     Comprehensive Metabolic Panel     Lipid Cascade     Order Specific Question:   Fasting is required?     Answer:   Yes     Medications Discontinued During This Encounter   Medication Reason     cyanocobalamin (VITAMIN B-12) 50 mcg tablet Therapy completed     hydroCHLOROthiazide (HYDRODIURIL) 25 MG tablet Side effects     albuterol (PROVENTIL HFA;VENTOLIN HFA) 90 mcg/actuation inhaler        No Follow-up on file.    I have had an Advance Directives discussion with the  patient.    Chief Complaint:   No chief complaint on file.      History of Present Illness:  Wendy is a 76 y.o. female presenting to the clinic today for insomnia. She has been infrequently taking 1/2 tablet of trazodone on nights that she is unable to fall asleep. She notes that she consistently wakes up at 3am and is unsure if she should take the other half of the tablet at this time. She generally takes the medication around 10pm and wakes up around 3. She has not tried falling asleep to a white noise machine.      Cough: She had a cold in September which has since resolved with an inhaler and medication. However, the cough has lingered and maintained prevalence, which is causing her  concern. She notes that she is sensitive to coughs after having bronchitis in the past. She describes the cough as a spasm, and she has had it continuously for the past 2-3 weeks, but intermittent since December. She notes a sensitivity to swallowing and sometimes feels as this causes her to fall into a coughing fit. She feels as though her albuterol inhaler does not help much. She denies any known triggers for the cough, but has noticed that sometimes cold air causes a bout of coughing. She lives at [facility] with her , so the air filters are consistently changed and the building is clean. She has used throat lozenges and the provide short time relief. She was a social smoker in college. She denies exposure to asbestos. She does not feel sick otherwise and the cough does not keep her up at night.    Arthritis: Her pain has become worse, and she is wondering if she needs to follow up with rheumatology again. Her most painful point is at the MCP joints; she is unable to make a fist. She feels as though she is still thoroughly using her hands and is not babying them. She has been to PT and is using a hot wax dip. She is taking gabapentin and Meloxicam and is able to tell a difference if she misses a dose. She notes  "that it seems to be worse at night when she is going to bed. She recalls the pain beginning with her diagnosis of carpal tunnel. She has had injections that have helped pain. She does not recall a history of rheumatoid arthritis.     Diabetes: She had an A1c of 5.7 at her last visit. She is down 5lbs since her last visit as well and she is very happy about this. She notes that she likes to eat sweets and finds that she is still able to control the amount that she eats.      Review of Systems:  She has been experiencing a side effect of dry mouth due to one of her medications. All other systems are negative.     PFSH:    Tobacco Use:  History   Smoking Status     Never Smoker   Smokeless Tobacco     Never Used       Vitals:  Vitals:    03/08/17 0838   BP: 134/72   Pulse: 64   Resp: 12   Weight: 147 lb 8 oz (66.9 kg)   Height: 5' 3\" (1.6 m)     Wt Readings from Last 3 Encounters:   03/08/17 147 lb 8 oz (66.9 kg)   09/07/16 152 lb (68.9 kg)   06/20/16 154 lb 1.3 oz (69.9 kg)     Body mass index is 26.13 kg/(m^2).      Physical Exam:  Constitutional:  Reveals an alert, cooperative, pleasant female in no acute distress.  Vitals:  Per nursing notes.  Ears:  Clear.  Oropharynx: Slight postnasal drainage, dry.   Neck:  Supple, no lymphadenopathy,  thyroid not palpable.  Cardiac:  Regular rate and rhythm without murmurs, rubs, or gallops.   Lungs: End inspiratory wheezing on right only. No expiratory wheezing. No rales or crackles.   Abdomen:  non-tender, non-distended.  Neither liver nor spleen palpable.  Skin:   Without rash, bruise, or palpable lesions.  Neurologic:  No gross focal deficits.    Psychiatric:  Mood appropriate, memory intact.   Xray: No infiltrates    Data Reviewed:  Additional history summarized (from old records or history from someone other than the patient or another healthcare provider) (2 TOTAL): Reviewed note from 03/18/16 regarding rheumatology    Decision to obtain extra information (old records " requested or history from another person or accessing Care Everywhere) (1 TOTAL): None.     Radiology tests summarized or ordered (XRAY/CT/MRI/DXA) (1 TOTAL): Ordered XR.     Labs reviewed or ordered (1 TOTAL): Reviewed labs.     Medicine tests summarized or ordered (EKG/ECHO) (1 TOTAL): None.    Independent review of EKG or X-Ray (2 EACH): Reviewed XR from today.       The visit lasted a total of 28 minutes face to face with the patient. Over 50% of the time was spent counseling and educating the patient about medication managment.    I, Natasha Andres, am scribing for and in the presence of, Dr. Wall.    I, Dr. Wall, personally performed the services described in this documentation, as scribed by Natasha Andres in my presence, and it is both accurate and complete.    Medications:  Current Outpatient Prescriptions   Medication Sig Dispense Refill     amitriptyline (ELAVIL) 10 MG tablet Take 2 tablets (20 mg total) by mouth bedtime. 180 tablet 3     ascorbic acid (VITAMIN C) 1000 MG tablet Take 1,000 mg by mouth daily.       atorvastatin (LIPITOR) 40 MG tablet TAKE 1 TABLET (40 MG) BY MOUTH ONCE DAILY 90 tablet 3     blood glucose test (CONTOUR NEXT STRIPS) strips Use 1 each As Directed 2 (two) times a day. 100 strip 3     cholecalciferol, vitamin D3, 1,000 unit tablet Take 1,000 Units by mouth daily.       FOLIC ACID/MULTIVITS-MIN/LUT (CENTRUM SILVER ORAL) Take by mouth.       gabapentin (NEURONTIN) 300 MG capsule Take 300 mg by mouth 3 (three) times a day.       generic lancets (FINGERSTIX LANCETS) 1 each by In Vitro route 2 (two) times a day. 100 each 3     losartan (COZAAR) 100 MG tablet Take 1 tablet (100 mg total) by mouth daily. 90 tablet 3     meloxicam (MOBIC) 15 MG tablet Take 1 tablet (15 mg total) by mouth daily. 90 tablet 3     metFORMIN (GLUCOPHAGE) 500 MG tablet Take 1 tablet (500 mg total) by mouth 2 (two) times a day with meals. 90 tablet 3     omega-3 fatty acids 1,000 mg cap Take by mouth.        traZODone (DESYREL) 50 MG tablet   3     VOLTAREN 1 % Gel APPLY TO UPPER EXTREMITIES, 2 GM OF GEL TO AFFECTED AREA 4 TIMES DAILY.  DO NOT APPLY MORE THAN 8 GM DAILY TO ANY ONE AFFECTED JOINT. 100 g 3     albuterol (PROVENTIL HFA;VENTOLIN HFA) 90 mcg/actuation inhaler Inhale 2 puffs every 6 (six) hours as needed for wheezing. 18 g 0     blood pressure monitor Kit Check BP couple times a week.  Goal <140/90   DX - HTN and Diabetes 1 each 0     predniSONE (DELTASONE) 20 MG tablet Take 2 tablets (40 mg total) by mouth daily for 5 days. 10 tablet 0     No current facility-administered medications for this visit.        Total Data Points: 6

## 2021-06-10 NOTE — TELEPHONE ENCOUNTER
RN cannot approve Refill Request    RN can NOT refill this medication med is not covered by policy/route to provider. Last office visit: 10/14/2019 Tsering Henson MD Last Physical: Visit date not found Last MTM visit: Visit date not found Last visit same specialty: 10/14/2019 Tsering Henson MD.  Next visit within 3 mo: Visit date not found  Next physical within 3 mo: Visit date not found      Rosa Ellis, [facility] Triage/Med Refill 8/1/2020    Requested Prescriptions   Pending Prescriptions Disp Refills     meloxicam (MOBIC) 15 MG tablet [Pharmacy Med Name: Meloxicam Oral Tablet 15 MG] 90 tablet 0     Sig: Take 1 tablet (15 mg) by mouth once daily       There is no refill protocol information for this order

## 2021-06-10 NOTE — PROGRESS NOTES
Assessment and Plan:     Wendy was seen today for annual wellness visit.    Encounter for general adult medical examination with abnormal findings  Immunizations reviewed --- needs shingrix, advised patient to get at pharmacy   Colonosocopy reviewed-- finished based on age and risk factors   Mammography reviewed-- finished based on age and risk factors  Pap reviewed -- finished based on age and risk factors.  Routine Dental and Eye care recommended  Discussed importance of regular exercise and appropriate calcium intake  Discussed Advance Directives-- gave patient form today.     -     HM1(CBC and Differential)  -     HM1 (CBC with Diff)    Type 2 diabetes mellitus with stage 1 chronic kidney disease, without long-term current use of insulin (H)  Continue with metformin 500 mg daily. A1C 6.4 today.   -     Glycosylated Hemoglobin A1c  -     Comprehensive Metabolic Panel  -     Microalbumin, Random Urine    Hyperlipidemia, unspecified hyperlipidemia type  -     Lipid Cascade FASTING  -     atorvastatin (LIPITOR) 40 MG tablet; Take 1 tablet (40 mg) by mouth ONCE daily    Benign Essential Hypertension  In good range.   -     losartan (COZAAR) 100 MG tablet; Take 1 tablet (100 mg total) by mouth daily.  -     hydroCHLOROthiazide (HYDRODIURIL) 25 MG tablet; Take 1 tablet (25 mg total) by mouth daily.    Eosinophilic asthma (H)  Stable. No exacerbations recently.   -     HM1(CBC and Differential)  -     HM1 (CBC with Diff)    Osteoarthritis of hand, unspecified laterality, unspecified osteoarthritis type  Well controlled with gabapentin. I advised her to add on tylenol prn.   -     gabapentin (NEURONTIN) 300 MG capsule; Take 900 mg 3 times a day    Insomnia, unspecified type  Stable. No side effects of medication.   -     amitriptyline (ELAVIL) 10 MG tablet; TAKE 1 TABLET BY MOUTH ONCE DAILY AT BEDTIME.  TAKE IN ADDITION TO 25 MG TABLET.  -     amitriptyline (ELAVIL) 25 MG tablet; Take 1 tablet (25 mg) by mouth nightly  at bedtime    Nonintractable headache, unspecified chronicity pattern, unspecified headache type  Stable.   -     amitriptyline (ELAVIL) 25 MG tablet; Take 1 tablet (25 mg) by mouth nightly at bedtime    Osteoarthritis of both hands, unspecified osteoarthritis type  Can add on tylenol prn.   -     meloxicam (MOBIC) 15 MG tablet; Take 1 tablet (15 mg) by mouth once daily    Asymptomatic postmenopausal status  Previous DEXA normal.   -     DXA Bone Density Scan; Future    Skin cancer screening  -     Ambulatory referral to Dermatology    Peripheral polyneuropathy  No feeling at bottom of all toes. Foot exam otherwise normal.       The patient's current medical problems were reviewed.    I have had an Advance Directives discussion with the patient.  The following health maintenance schedule was reviewed with the patient and provided in printed form in the after visit summary:   Health Maintenance   Topic Date Due     HEPATITIS B VACCINES (1 of 3 - Risk 3-dose series) [birth year]/25/1959     MEDICARE ANNUAL WELLNESS VISIT  07/25/2004     ZOSTER VACCINES (2 of 3) 08/10/2010     DXA SCAN  10/06/2019     DIABETIC FOOT EXAM  10/22/2019     A1C  04/14/2020     FALL RISK ASSESSMENT  04/17/2020     DIABETIC EYE EXAM  05/08/2020     INFLUENZA VACCINE RULE BASED (1) 08/01/2020     ASTHMA ACTION PLAN  10/14/2020     BMP  10/14/2020     LIPID  10/14/2020     MICROALBUMIN  10/14/2020     Asthma Control Test  10/18/2020     TD 18+ HE  04/25/2023     ADVANCE CARE PLANNING  04/25/2023     PNEUMOCOCCAL IMMUNIZATION 65+ HIGH/HIGHEST RISK  Completed        Subjective:   Chief Complaint: Wendy Suárez is an 80 y.o. female here for an Annual Wellness visit.   HPI:  79 yo female with h/o hypertension, hyperlipidemia, type 2 diabetes, osteoarthritis, eosinophilic asthma who is here for an annual physical examination and fasting lab work.  She is fasting today so we will obtain fasting lab work.  Advanced directives paperwork was given to  patient.  She is due for shingles vaccine.  She would like refill of her chronic medications.  Take gabapentin (for chronic OA pain and neuropathy), amitriptyline (for headache prevention and sleep), her atorvastatin and antihypertensive medications.  She is due for bone density scan.  She is finished with Pap smear, mammogram, colonoscopy.  She sees pulmonology for eosinophilic asthma, last visit 4/2/2020.  She has been doing well as far as her asthma is concerned.  She did not tolerate ICS/LABA previously.   She's active, exercising 3 days a week. Also taking care of her  and lifting him.     Review of Systems:  Please see above.  The rest of the review of systems are negative for all systems.    Patient Care Team:  Tsering Henson MD as PCP - General (Family Medicine)  Tsering Henson MD as Assigned PCP     Patient Active Problem List   Diagnosis     Carpal Tunnel Syndrome     Menopause     Headache     Tarsal Tunnel Syndrome     Bronchospasm     Hyperlipidemia     Benign Essential Hypertension     Osteoarthritis Of The Hand     Cough     Unspecified cataract     Type II diabetes mellitus (H)     Insomnia, unspecified type     Sicca syndrome (H)     Essential hypertension     Upper airway cough syndrome     Asthma     Elevated IgE level     Eosinophilic asthma (H)     Past Medical History:   Diagnosis Date     Asthma     high IgE, elevated FENO 115 highest     Cough      Hypertension       Past Surgical History:   Procedure Laterality Date     HYSTERECTOMY Bilateral 1993    was not cancer, for fibroids     OOPHORECTOMY Bilateral 1993      Family History   Problem Relation Age of Onset     Diabetes Father      Hypertension Father      Heart disease Paternal Grandfather      Alzheimer's disease Mother       Social History     Socioeconomic History     Marital status:      Spouse name: Not on file     Number of children: Not on file     Years of education: Not on file     Highest education level:  Not on file   Occupational History     Not on file   Social Needs     Financial resource strain: Not on file     Food insecurity     Worry: Not on file     Inability: Not on file     Transportation needs     Medical: Not on file     Non-medical: Not on file   Tobacco Use     Smoking status: Former Smoker     Packs/day: 0.00     Types: Cigarettes     Smokeless tobacco: Never Used     Tobacco comment: quit 55 years ago, social only   Substance and Sexual Activity     Alcohol use: Yes     Alcohol/week: 0.0 - 1.0 standard drinks     Drug use: Never     Sexual activity: Not Currently   Lifestyle     Physical activity     Days per week: Not on file     Minutes per session: Not on file     Stress: Not on file   Relationships     Social connections     Talks on phone: Not on file     Gets together: Not on file     Attends Advent service: Not on file     Active member of club or organization: Not on file     Attends meetings of clubs or organizations: Not on file     Relationship status: Not on file     Intimate partner violence     Fear of current or ex partner: Not on file     Emotionally abused: Not on file     Physically abused: Not on file     Forced sexual activity: Not on file   Other Topics Concern     Not on file   Social History Narrative    Lives at [facility]. Does strength training 3 times a week.     Her  has been dealing with medical issues and is now in a wheelchair.         She has 3 kids. She drives her on her.         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       Current Outpatient Medications   Medication Sig Dispense Refill     albuterol (PROAIR HFA) 90 mcg/actuation inhaler INHALE 2 PUFFS BY INHALATION ROUTE EVERY 6 HOURS AS NEEDED FOR WHEEZING 1 Inhaler 11     albuterol (PROVENTIL) 2.5 mg /3 mL (0.083 %) nebulizer solution Take 3 mL (2.5 mg total) by nebulization every 6 (six) hours as needed for wheezing. 1 vial 12     amitriptyline (ELAVIL) 10 MG  tablet TAKE 1 TABLET BY MOUTH ONCE DAILY AT BEDTIME.  TAKE IN ADDITION TO 25 MG TABLET. 90 tablet 3     amitriptyline (ELAVIL) 25 MG tablet Take 1 tablet (25 mg) by mouth nightly at bedtime 90 tablet 1     ascorbic acid (VITAMIN C) 1000 MG tablet Take 1,000 mg by mouth daily.       atorvastatin (LIPITOR) 40 MG tablet Take 1 tablet (40 mg) by mouth ONCE daily 90 tablet 0     blood glucose meter (GLUCOMETER) Use 1 each As Directed as needed. Dispense glucometer brand per patient's insurance at pharmacy discretion. Dx - Diabetes 1 each 0     blood glucose meter (GLUCOMETER) Use 1 each As Directed as needed. Dispense glucometer brand per patient's insurance at pharmacy discretion. 200 each 3     blood glucose test (CONTOUR NEXT TEST STRIPS) strips USE 1 EACH AS DIRECTED 2 TIMES A DAY.. 200 strip 3     blood glucose test strips Use 1 each As Directed 2 (two) times a day Dispense brand per patient's insurance at pharmacy discretion.. 100 strip 5     blood glucose test strips Use 1 each As Directed as needed. Dispense brand per patient's insurance at pharmacy discretion. 100 strip 5     blood glucose test strips Check sugar twice daily.  Dispense Accu check which works with new meter - per patient's insurance at pharmacy discretion. DX DM E11.9 100 strip 5     blood glucose test strips Use 1 each As Directed 2 (two) times a day. Dispense brand per patient's insurance at pharmacy discretion. 200 strip 3     blood pressure monitor Kit Check BP couple times a week.  Goal <140/90   DX - HTN and Diabetes 1 each 0     blood-glucose meter (ACCU-CHEK LI PLUS METER) Misc Check sugars 2 times daily. DX DM E11.9 1 each 0     cholecalciferol, vitamin D3, 1,000 unit tablet Take 1,000 Units by mouth daily.       fluticasone propionate (FLONASE) 50 mcg/actuation nasal spray use 2 sprays each nostril daily. 16 g 2     FOLIC ACID/MULTIVITS-MIN/LUT (CENTRUM SILVER ORAL) Take by mouth.       gabapentin (NEURONTIN) 300 MG capsule take 3  "capsules by mouth three times daily 810 capsule 2     generic lancets (FINGERSTIX LANCETS) Use 1 each As Directed 2 (two) times a day. Dispense brand per patient's insurance at pharmacy discretion. 200 each 3     generic lancets (MICROLET LANCET) Dispense brand per patient's insurance at pharmacy discretion. Check blood sugar twice daily. 100 each 5     hydroCHLOROthiazide (HYDRODIURIL) 25 MG tablet Take 1 tablet (25 mg total) by mouth daily. 90 tablet 2     losartan (COZAAR) 100 MG tablet Take 1 tablet (100 mg total) by mouth daily. 90 tablet 1     meloxicam (MOBIC) 15 MG tablet Take 1 tablet (15 mg) by mouth once daily 90 tablet 0     metFORMIN (GLUCOPHAGE) 500 MG tablet Take 1 tablet (500 mg total) by mouth daily with breakfast. 90 tablet 3     VOLTAREN 1 % Gel APPLY TO UPPER EXTREMITIES, 2 GM OF GEL TO AFFECTED AREA 4 TIMES DAILY.  DO NOT APPLY MORE THAN 8 GM DAILY TO ANY ONE AFFECTED JOINT. 100 g 3     azithromycin (ZITHROMAX) 250 MG tablet Take 500 mg (2 x 250 mg tablets) on day 1 followed by 250 mg (1 tablet) on days 2-5. 6 tablet 3     No current facility-administered medications for this visit.       Objective:   Vital Signs:   Visit Vitals  /50 (Patient Site: Left Arm, Patient Position: Sitting, Cuff Size: Adult Regular)   Pulse 75   Temp 98.3  F (36.8  C) (Oral)   Ht 5' 2.5\" (1.588 m)   Wt 141 lb (64 kg)   LMP 09/08/1989   SpO2 95%   BMI 25.38 kg/m           VisionScreening:  No exam data present     PHYSICAL EXAM  /50 (Patient Site: Left Arm, Patient Position: Sitting, Cuff Size: Adult Regular)   Pulse 75   Temp 98.3  F (36.8  C) (Oral)   Ht 5' 2.5\" (1.588 m)   Wt 141 lb (64 kg)   LMP 09/08/1989   SpO2 95%   BMI 25.38 kg/m      General Appearance:    Alert, cooperative, no distress, appears stated age   Head:    Normocephalic, without obvious abnormality, atraumatic   Eyes:    PERRL, conjunctiva/corneas clear, EOM's intact, fundi     benign, both eyes   Ears:    Normal TM's and " external ear canals, both ears   Nose:   Nares normal, septum midline, mucosa normal, no drainage     or sinus tenderness   Throat:   Lips, mucosa, and tongue normal; teeth and gums normal   Neck:   Supple, symmetrical, trachea midline, no adenopathy;     thyroid:  no enlargement/tenderness/nodules; no carotid    bruit or JVD   Back:     Symmetric, no curvature, ROM normal, no CVA tenderness   Lungs:     Clear to auscultation bilaterally, respirations unlabored   Chest Wall:    No tenderness or deformity    Heart:    Regular rate and rhythm, S1 and S2 normal, no murmur, rub    or gallop   Breast Exam:    No tenderness, masses, or nipple abnormality   Abdomen:     Soft, non-tender, bowel sounds active all four quadrants,     no masses, no organomegaly   Genitalia:    Normal female without lesion, discharge or tenderness   Rectal:    Normal tone, no masses or tenderness; guaiac negative stool   Extremities:   Extremities normal, atraumatic, no cyanosis or edema   Pulses:   2+ and symmetric all extremities   Skin:   Skin color, texture, turgor normal, no rashes or lesions   Lymph nodes:   Cervical, supraclavicular, and axillary nodes normal   Neurologic:   CNII-XII intact, normal strength, sensation and reflexes     Throughout  FEET:  MF TESTING: abnormal, no feeling at the bottom of all toes.               SKIN EXAM:  NL              VASCULAR:   R DP  PULSE: +                                      L DP  PULSE: +                                      R PT  PULSE: +                                      L PT  PULSE: +           Assessment Results 8/6/2020   Activities of Daily Living No help needed   Instrumental Activities of Daily Living No help needed   Mini Cog Total Score 4   Some recent data might be hidden     A Mini-Cog score of 0-2 suggests the possibility of dementia, score of 3-5 suggests no dementia    Identified Health Risks:     Information on urinary incontinence and treatment options given to  patient.  Patient's advanced directive was discussed and paperwork given to her the patient's wishes.

## 2021-06-12 NOTE — TELEPHONE ENCOUNTER
----- Message from Tsering Henson MD sent at 10/8/2020 11:32 AM CDT -----  Please let patient know:  There's been decline in her bone density in the left hip and her spine. However no need for medication. Continue with vitamin D supplementation, she should take 2,000 international units a day and continue with exercise, weight bearing/lifting exercises. Repeat bone density in 3 years. Let me know if she has questions    Please send result letter with above message.

## 2021-06-13 NOTE — PROGRESS NOTES
[facility] Clinic Outpatient Consultation    Assessment and Plan:   Wendy Suárez is a 77 y.o. female with a history of HTN, DM, OA, HLD, referred for evaluation of wheezing. The wheezing appears to be seasonal but her cough has persisted for years. Her spirometry is normal. Her lungs are clear today. The etiology of her chronic cough and wheezing could be due to post-nasal drip, asthma/allergic rhinitis, UACS (upper airway cough syndrome), silent reflux, and untreated LILIANA. She has no symptoms to suggest the latter two but reflux is still a possibility. My suspicion is that her symptoms mostly stem from upper airway causes as they tend to worsen during the winter. She may have airway hyperresponsiveness that mimicks asthma. Finally, the expiratory limb of her flow volume loop does suggest variable extrathoracic obstruction so we will need to rule out upper airway obstruction as a cause.    Recommendations:  -Repeat CBC to check for eosinophilia  -Trial of Flonase nasal spray  -check allergen panel and IgE  -offered trial of singulair and PPI for allergies and reflux, respectively; but she declines adding additional medications at this time  -CT scan of the neck to check for extrathoracic pathology given the appearance of her flow volume loop  -Consider sleep apnea testing in the future, she declines for now  -She is UTD with flu and pneumonia vaccines  -Follow up in 3 months for reassessment    I appreciate the opportunity to participate in the care of Ms. Suárez.  Please feel free to contact me at any time.    CCx: wheezing    HPI: 77 year old lady with a history of HTN, DM, OA, HLD, referred for evaluation of wheezing. She has been wheezing for many years and has yearly URI's, usually in the winter time and requiring albuterol and a short course of antibiotics. She was previously on lisinopril which caused cough; this was stopped a few years ago but the cough persisted. She has nasal stuffiness and congestion. She  has occasional rhinorrhea. She does not report any seasonal allergies but both her children have allergies. She uses albuterol only when she gets the cold and starts wheezing; otherwise she never uses it. She is very active, exercises 3 times per week and sings in a choir; she notices that her breath control during singing is not as good as it once was. She denies fevers, chills, dyspnea or night sweats. Says she generally sleeps well; has never been told she snores or has apneas. Does not feel tired during the day. Denies problems with aspiration or heartburn/indigestion.    ROS:  A 12-system review was obtained and was negative with the exception of the symptoms endorsed in the history of present illness.    PMH:  Past Medical History:   Diagnosis Date     Carpal tunnel syndrome      Diabetes      Essential hypertension        PSH:  Past Surgical History:   Procedure Laterality Date     HYSTERECTOMY  1993     OOPHORECTOMY Bilateral 1993       Allergies:  Allergies   Allergen Reactions     Ace Inhibitors Cough     Lisinopril      Sulfa (Sulfonamide Antibiotics)        Family HX:  Family History   Problem Relation Age of Onset     Diabetes Father      Hypertension Father      Heart disease Paternal Grandfather        Social Hx:  Social History     Social History     Marital status:      Spouse name: N/A     Number of children: N/A     Years of education: N/A     Occupational History     Not on file.     Social History Main Topics     Smoking status: Current Every Day Smoker     Types: Cigarettes     Smokeless tobacco: Never Used      Comment: quit 55 years ago, social only     Alcohol use No     Drug use: Not on file     Sexual activity: Not on file     Other Topics Concern     Not on file     Social History Narrative       Current Meds:  Current Outpatient Prescriptions   Medication Sig Dispense Refill     albuterol (PROAIR HFA) 90 mcg/actuation inhaler INHALE 2 PUFFS BY INHALATION ROUTE EVERY 6 HOURS AS  "NEEDED FOR WHEEZING 8.5 g 1     amitriptyline (ELAVIL) 10 MG tablet TAKE 2 TABLETS BY MOUTH NIGHTLY AT BEDTIME 180 tablet 0     ascorbic acid (VITAMIN C) 1000 MG tablet Take 1,000 mg by mouth daily.       atorvastatin (LIPITOR) 40 MG tablet TAKE 1 TABLET (40 MG) BY MOUTH ONCE DAILY 90 tablet 1     blood pressure monitor Kit Check BP couple times a week.  Goal <140/90   DX - HTN and Diabetes 1 each 0     cholecalciferol, vitamin D3, 1,000 unit tablet Take 1,000 Units by mouth daily.       CONTOUR NEXT STRIPS strips USE 1 EACH AS DIRECTED 2 TIMES A DAY. 100 strip 2     FOLIC ACID/MULTIVITS-MIN/LUT (CENTRUM SILVER ORAL) Take by mouth.       gabapentin (NEURONTIN) 300 MG capsule take 3 capsules by mouth three times daily  270 capsule 2     generic lancets (FINGERSTIX LANCETS) 1 each by In Vitro route 2 (two) times a day. 100 each 3     hydroCHLOROthiazide (HYDRODIURIL) 25 MG tablet TAKE ONE TABLET BY MOUTH ONE TIME DAILY  90 tablet 3     losartan (COZAAR) 100 MG tablet TAKE 1 TABLET  BY MOUTH ONCE DAILY 90 tablet 3     meloxicam (MOBIC) 15 MG tablet Take 1 tablet (15 mg total) by mouth daily. 90 tablet 0     metFORMIN (GLUCOPHAGE) 500 MG tablet TAKE 1 TABLET BY MOUTH 2 TIMES PER DAY  180 tablet 3     omega-3 fatty acids 1,000 mg cap Take by mouth.       traZODone (DESYREL) 50 MG tablet   3     VOLTAREN 1 % Gel APPLY TO UPPER EXTREMITIES, 2 GM OF GEL TO AFFECTED AREA 4 TIMES DAILY.  DO NOT APPLY MORE THAN 8 GM DAILY TO ANY ONE AFFECTED JOINT. 100 g 3     fluticasone (FLONASE) 50 mcg/actuation nasal spray 1 spray each nostril daily 16 g 12     No current facility-administered medications for this visit.        Physical Exam:  /56  Pulse 86  Resp 16  Ht 5' 4\" (1.626 m)  Wt 153 lb 6.4 oz (69.6 kg)  LMP 09/08/1989  SpO2 96% Comment: RA  BMI 26.33 kg/m2  Gen: awake, alert, oriented, no distress  HEENT: nasal turbinates are unremarkable, no oropharyngeal lesions, no cervical or supraclavicular lymphadenopathy  CV: " RRR, no M/G/R  Resp: CTAB, no focal crackles or wheezes  Abd: soft, nontender, no palpable organomegaly  Skin: no apparent rashes  Ext: no cyanosis, clubbing or edema  Neuro: alert, nonfocal    Labs:  Reviewed  CBC in 2014 normal but had 7% eos.    Imaging studies:  CXR March 2017:  XR CHEST PA AND LATERAL3/8/2017 9:32 AMINDICATION: Cough and end inspiratoy wheeze on right.  Eval for any infiltrateCOMPARISON: None.FINDINGS: Lungs are mildly hyperinflated but clear. Heart size normal. Degenerative changes in the spine    PFT's 10/17:    FEV1/FVC is 77 and is normal.  FEV1 is 84% predicted and is normal.  FVC is 83% predicted and normal.  There was no improvement in spirometry after a single inhaled dose of bronchodilator.  TLC is 99% predicted and is normal.  RV is 123% predicted and is normal.  DLCO is 84% predicted and is normal when it   is corrected for hemoglobin.     Impression:  Full Pulmonary Function Test is normal.     There is consistent flattening of inspiratory limb of f-v loop suggestive of variable extrathoracic obstruction    Adalid Moreno MD  [facility]  Pager [phone]  Clinic [phone]

## 2021-06-13 NOTE — PROGRESS NOTES
SUBJECTIVE:   Chief Complaint   Patient presents with     Diabetes     Med Check; Fasting Labs     Hyperlipidemia     Med Check; Fasting Labs     Wendy Suárez 77 y.o. female    Current Outpatient Prescriptions   Medication Sig Dispense Refill     albuterol (PROAIR HFA) 90 mcg/actuation inhaler INHALE 2 PUFFS BY INHALATION ROUTE EVERY 6 HOURS AS NEEDED FOR WHEEZING 8.5 g 1     amitriptyline (ELAVIL) 10 MG tablet TAKE 2 TABLETS BY MOUTH NIGHTLY AT BEDTIME 180 tablet 0     ascorbic acid (VITAMIN C) 1000 MG tablet Take 1,000 mg by mouth daily.       atorvastatin (LIPITOR) 40 MG tablet TAKE 1 TABLET (40 MG) BY MOUTH ONCE DAILY 90 tablet 1     blood pressure monitor Kit Check BP couple times a week.  Goal <140/90   DX - HTN and Diabetes 1 each 0     cholecalciferol, vitamin D3, 1,000 unit tablet Take 1,000 Units by mouth daily.       CONTOUR NEXT STRIPS strips USE 1 EACH AS DIRECTED 2 TIMES A DAY. 100 strip 2     FOLIC ACID/MULTIVITS-MIN/LUT (CENTRUM SILVER ORAL) Take by mouth.       gabapentin (NEURONTIN) 300 MG capsule Take 3 capsules (900 mg total) by mouth 3 (three) times a day. 270 capsule 3     generic lancets (FINGERSTIX LANCETS) 1 each by In Vitro route 2 (two) times a day. 100 each 3     hydroCHLOROthiazide (HYDRODIURIL) 25 MG tablet TAKE ONE TABLET BY MOUTH ONE TIME DAILY  90 tablet 3     losartan (COZAAR) 100 MG tablet TAKE 1 TABLET  BY MOUTH ONCE DAILY 90 tablet 3     meloxicam (MOBIC) 15 MG tablet Take 1 tablet (15 mg total) by mouth daily. 90 tablet 3     metFORMIN (GLUCOPHAGE) 500 MG tablet TAKE 1 TABLET BY MOUTH 2 TIMES PER DAY  180 tablet 3     omega-3 fatty acids 1,000 mg cap Take by mouth.       traZODone (DESYREL) 50 MG tablet   3     VOLTAREN 1 % Gel APPLY TO UPPER EXTREMITIES, 2 GM OF GEL TO AFFECTED AREA 4 TIMES DAILY.  DO NOT APPLY MORE THAN 8 GM DAILY TO ANY ONE AFFECTED JOINT. 100 g 3     No current facility-administered medications for this visit.      Allergies: Ace inhibitors; Lisinopril; and  Sulfa (sulfonamide antibiotics)   Patient's last menstrual period was 09/08/1989.    HPI:   Pleasant 77-year-old, who typically follows with Dr. Wall, here for follow-up for diabetes, med check, and ongoing wheezing.    Regarding diabetes, she is due for A1c today.  She takes metformin 500 mg twice daily.  States she is up-to-date on eye exam.  Due for urine microalbumin screening.  Has neuropathy in the hands and feet, states previously worked up and started on gabapentin.      Osteoarthritis pain: She takes meloxicam occasionally.  Advil works as well.  She has tried Tylenol but it does not help.    Insomnia: Uses amitriptyline which she has been on for years for sleep.  Rarely uses trazodone but causes dry mouth.    Hypertension: She is on hydrochlorothiazide and losartan.  CMP in March showed normal renal function and electrolytes    Hyperlipidemia: She is on atorvastatin 40 mg daily.  Fasting lipids in March showed adequate control    Wheezing: States she has experienced wheezing with URIs for years, has used albuterol for this.  No diagnosis of asthma or COPD.  Never a smoker.  She does not believe she has allergies.  She does notice a lot of postnasal drainage.  She has been wheezing on and off all summer.  When she was here in March she mentioned wheezing and cough, chest x-ray was done and unremarkable.  She still has intermittent cough, with sputum production.  She is using albuterol more frequently now.  Needs a refill.    ROS: as per HPI    OBJECTIVE:   /66 (Patient Site: Right Arm, Patient Position: Sitting, Cuff Size: Adult Regular)  Pulse 80  Temp 97.4  F (36.3  C) (Oral)   Resp 16  Wt 153 lb 3.2 oz (69.5 kg)  LMP 09/08/1989  BMI 27.14 kg/m2    General: Pleasant, well-appearing, in no acute distress.  HEENT: Pupils equal, round, reactive to light.  Oropharynx clear with moist mucous membranes. Neck supple without lymphadenopathy.  Cardiovascular: Heart regular rate and rhythm, normal  S1-S2, no murmurs, rubs, or gallops  Respiratory: Scattered wheezes.  Good air movement throughout.  No increased work of breathing.  Extremities: Warm and well perfused without edema  Skin: No jaundice or pallor.  Feet: Abnormal monofilament sensation testing on the bottom of the feet bilaterally.  She is unable to feel it anywhere on the plantar aspect of the feet.  Psych: Presents on time and well groomed.  Normal speech and thought content.  Good eye contact      ASSESSMENT/PLAN  1. Wheezing  Chest x-ray in March for similar symptoms was negative.  Worsening over the summer.  Refill of albuterol provided but discussed the need for further workup.  PFTs and referral to pulmonology ordered.  - albuterol (PROAIR HFA) 90 mcg/actuation inhaler; INHALE 2 PUFFS BY INHALATION ROUTE EVERY 6 HOURS AS NEEDED FOR WHEEZING  Dispense: 8.5 g; Refill: 1  - Ambulatory referral to Pulmonology  - PFT Complete; Future    2. Hyperlipidemia  Fasting lipids and CMP up-to-date.  Repeat in 6 months.  Refill provided.  - atorvastatin (LIPITOR) 40 MG tablet; TAKE 1 TABLET (40 MG) BY MOUTH ONCE DAILY  Dispense: 90 tablet; Refill: 1    3.  Screening and immunization need  - Mammo Screening Bilateral; Future  - Influenza High Dose, Seasonal 65+ yrs    4. Post-menopausal  Has not had a bone density scan since 2011.  Scan ordered.  - DXA Bone Density Scan; Future    5. DM (diabetes mellitus)  A1c shows good control.  Continue current medication regimen.  Urine microalbumin screen.  Follow-up in 6 months, sooner if problems or concerns  - Glycosylated Hemoglobin A1c  - Microalbumin, Random Urine     6.  Hypertension  Adequately controlled.  Continue current medication regimen.  Labs up-to-date.    7.  Osteoarthritis  Occasionally uses Mobic.  Also gets relief with Advil so she could use that as needed.  Tylenol does not seem to help by her report.

## 2021-06-14 NOTE — TELEPHONE ENCOUNTER
Refill Approved    Rx renewed per Medication Renewal Policy. Medication was last renewed on 6/4/19, last OV 8/6/20.    Rosa Ellis, [facility] Triage/Med Refill 1/23/2021     Requested Prescriptions   Pending Prescriptions Disp Refills     ONETOUCH VERIO TEST STRIPS strips [Pharmacy Med Name: OneTouch Verio In Vitro Strip]  0     Sig: test 2 times a day       Diabetic Supplies Refill Protocol Passed - 1/22/2021 11:57 AM        Passed - Visit with PCP or prescribing provider visit in last 6 months     Last office visit with prescriber/PCP: Visit date not found OR same dept: Visit date not found OR same specialty: 10/14/2019 Tsering Henson MD  Last physical: Visit date not found Last MTM visit: Visit date not found   Next visit within 3 mo: Visit date not found  Next physical within 3 mo: Visit date not found  Prescriber OR PCP: Petra Michelle DO  Last diagnosis associated with med order: 1. Type 2 diabetes mellitus with stage 1 chronic kidney disease, without long-term current use of insulin (H)  - ONETOUCH VERIO TEST STRIPS strips [Pharmacy Med Name: OneTouch Verio In Vitro Strip]; test 2 times a day; Refill: 0    If protocol passes may refill for 12 months if within 3 months of last provider visit (or a total of 15 months).             Passed - A1C in last 6 months     Hemoglobin A1c   Date Value Ref Range Status   08/06/2020 6.4 (H) <=5.6 % Final     Comment:     Normal <5.7% Prediabete 5.7-6.4% Diabletes 6.5% or higher - adopted from ADA consensus guidelines

## 2021-06-15 PROBLEM — M35.00 SICCA SYNDROME (H): Status: ACTIVE | Noted: 2017-03-08

## 2021-06-15 NOTE — PROGRESS NOTES
"    Assessment & Plan     Wendy was seen today for medication management and diabetes.    Diagnoses and all orders for this visit:    Type 2 diabetes mellitus with stage 1 chronic kidney disease, without long-term current use of insulin (H)  Stable. a1c 6.3. will stop metformin altogether. F/u in 6 months.   -     Glycosylated Hemoglobin A1c  -     Microalbumin, Random Urine    Hyperlipidemia, unspecified hyperlipidemia type  Stable. Tolerating med well.   -     Lipid [place] FASTING    Benign Essential Hypertension  In good control.   -     Comprehensive Metabolic Panel    Sicca syndrome (H)  Stable. No symptoms. Saw rheumatology 2014.     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 Otherwise stable, asymptomatic.   -     HM1(CBC and Differential)       BMI:   Estimated body mass index is 26.64 kg/m  as calculated from the following:    Height as of this encounter: 5' 2.5\" (1.588 m).    Weight as of this encounter: 148 lb (67.1 kg).   The following high BMI interventions were performed this visit: encouragement to exercise and lifestyle education regarding diet    Return in about 6 months (around 8/11/2021) for Annual physical, chronic medical conditions.    Tsering Henson MD  [facility]    Subjective   Wendy Suárez is 80 y.o. and presents today for the following health issues   HPI   Chief Complaint   Patient presents with     Medication Management     No current refills needed but needs refills added on, pt is fasting today     Diabetes     A1C done today     Has first covid shot scheduled for 2/15/21.   Has chronic finger pain for which she takes gabapentin and mobic for.   Takes elavil for headache and sleep.    Review of Systems  Negative except as above.       Objective    /59 (Patient Site: Left Arm, Patient Position: Sitting, Cuff Size: Adult Regular)   Pulse 78   Ht 5' 2.5\" (1.588 m)   Wt 148 lb (67.1 kg) Comment: with boots  LMP 09/08/1989   SpO2 95%   BMI " 26.64 kg/m    Body mass index is 26.64 kg/m .  Physical Exam  GENERAL: Healthy, alert and no distress  RESP: lungs clear to auscultation - no rales, rhonchi or wheezes, no rales , no rhonchi and no wheezes  CV: regular rates and rhythm, normal S1 S2, no S3 or S4 and no murmur, click or rub  MS: FEET:  MF TESTING: absent sensation all the way up to mid shin bilaterally.               SKIN EXAM:  NL              VASCULAR:   R DP  PULSE: +                                      L DP  PULSE: +                                      R PT  PULSE: +                                      L PT  PULSE: +    NEURO: Cranial nerves grossly intact. Mentation and speech appropriate for age.  PSYCH: Mentation appears normal, affect normal/bright, judgement and insight intact, normal speech and appearance well-groomed

## 2021-06-15 NOTE — PATIENT INSTRUCTIONS - HE
We are working hard to begin vaccinating more people against COVID-19. Currently, we are only vaccinating individuals age 75 and older and Phase 1a workers - healthcare workers who are unable to do their job remotely. Vaccine availability is very limited.    If you are 75 or older, or a healthcare worker who is unable to do your job remotely, please log in to [employer] using this link to schedule an appointment.  If there are no appointments left, you will be unable to schedule and need to check back later.  If you are a healthcare worker, you will be asked to provide proof of employment at your appointment. If you cannot, you will be turned away.    Vaccine appointments are being added as they become available. Please check your [employer] account frequently for availability. If you have technical difficulty using [employer], call [phone] for assistance.    You can learn more about the [facility]'s phased approach to administering the vaccine, with details on each phase, [email or web address].      Aa vaccine supply increases and we are able to open appointments to more groups, we will share that information on our website [email or web address]. Check this website to stay up to date on COVID-19 vaccination information.

## 2021-06-15 NOTE — TELEPHONE ENCOUNTER
Refill Approved    Rx renewed per Medication Renewal Policy. Medication was last renewed on 8/6/20.    Licha Darnell, [facility] Triage/Med Refill 2/7/2021     Requested Prescriptions   Pending Prescriptions Disp Refills     losartan (COZAAR) 100 MG tablet [Pharmacy Med Name: Losartan Potassium Oral Tablet 100 MG] 90 tablet 0     Sig: Take 1 tablet (100 mg total) by mouth daily.       Angiotensin Receptor Blocker Protocol Passed - 2/7/2021  5:42 PM        Passed - PCP or prescribing provider visit in past 12 months       Last office visit with prescriber/PCP: 10/14/2019 Tsering Henson MD OR same dept: Visit date not found OR same specialty: 10/14/2019 Tsering Henson MD  Last physical: 8/6/2020 Last MTM visit: Visit date not found   Next visit within 3 mo: Visit date not found  Next physical within 3 mo: Visit date not found  Prescriber OR PCP: Tsering Henson MD  Last diagnosis associated with med order: 1. Benign Essential Hypertension  - losartan (COZAAR) 100 MG tablet [Pharmacy Med Name: Losartan Potassium Oral Tablet 100 MG]; Take 1 tablet (100 mg total) by mouth daily.  Dispense: 90 tablet; Refill: 0    If protocol passes may refill for 12 months if within 3 months of last provider visit (or a total of 15 months).             Passed - Serum potassium within the past 12 months     Lab Results   Component Value Date    Potassium 4.1 08/06/2020             Passed - Blood pressure filed in past 12 months     BP Readings from Last 1 Encounters:   08/06/20 116/50             Passed - Serum creatinine within the past 12 months     Creatinine   Date Value Ref Range Status   08/06/2020 1.01 0.60 - 1.10 mg/dL Final

## 2021-06-15 NOTE — PROGRESS NOTES
Assessment & Plan:  1. Bronchitis  Patient with rales on exam worsening cough over a month and now significantly.  Based on clinical exam: Treat for bronchitis with antibiotics patient was given prescription for azithromycin as she had significant wheezing in all lung fields will start her on prednisone.  If any difficulty breathing or worsening of symptoms patient to go to ER.  Cautioned patient that the steroids will elevate her sugars    2. Bronchospasm  Patient with significant bronchospasm.  Will give her prednisone as well as albuterol inhaler.  Based on reviewing her recent pulmonology note would recommend her on a steroid inhaler.  She has pulmonology follow-up in 2 weeks and she will discuss it with them.      No orders of the defined types were placed in this encounter.    There are no discontinued medications.     Patient Instructions   Start taking Zithromax, two tablets today, then one tablet daily for 4 days.     Start taking prednisone, 40 mg once daily for 5 days.     Return if symptoms worsen or fail to improve.      Chief Complaint:   Chief Complaint   Patient presents with     Cough     productive brown cough for 4 weeks     Wheezing     wheezing with shallow breaths        History of Present Illness:  Wendy is a 77 y.o. female presenting to the clinic today for concerns of a cough and wheezing. Her symptoms began one month ago. She states that it started with a nuisance cold and it has progressed into a cough that has been worsening. She has been using her inhaler, Zinc nasal swabs, Airborne, cough drops, and other OTC medications. The albuterol inhaler helps for the one hour after she uses it, and uses the inhaler every 4 hours. She denies any fevers or chills. She has had some shortness of breath, particularly with walking up stairs. She has not exercised over the last month because she has not felt up to it. She has been wheezing a lot at night and her  as noted this to her. She was told  through pulmonology that she has a marker for asthma in her blood work when she was seen on 10/26/17. It was noted that she may need a steroid inhaler for the winter months and she can be off during the summer. She will be following up with pulmonology this February to receive an inhaler and learn how to use it correctly. She had a soft tissue neck CT scan completed in October when she saw pulmonology and it showed that her airways were clear. She has been effectively treated with prednisone and Zithromax in the past.    Diabetes: She mentions that her blood sugars have been a little bit higher recently, probably because of the holidays. Her last A1c was 6.4% as of 17.       Review of Systems:  She has sweats but they are related to her hot flashes.   All other systems are negative.     PFSH:  Social: She lost her first child back in - he was a premature baby. She came from a  this morning- it was a recent friend.     Tobacco Use:  History   Smoking Status     Former Smoker     Types: Cigarettes   Smokeless Tobacco     Never Used     Comment: quit 55 years ago, social only       Vitals:  Vitals:    18 1233   BP: 144/62   Patient Site: Right Arm   Patient Position: Sitting   Cuff Size: Adult Regular   Pulse: 80   Resp: 16   Temp: 98  F (36.7  C)   TempSrc: Oral   Weight: 153 lb 1.6 oz (69.4 kg)     Wt Readings from Last 3 Encounters:   18 153 lb 1.6 oz (69.4 kg)   10/26/17 153 lb 6.4 oz (69.6 kg)   17 153 lb 3.2 oz (69.5 kg)     Body mass index is 26.28 kg/(m^2).      Physical Exam:  Constitutional:  Reveals an alert, cooperative, talkative, and very pleasant female in no acute distress.  Vitals:  Per nursing notes.  Ears:  Clear.  Oropharynx:  Without posterior nasal drainage or thrush.  Neck:  Supple, no lymphadenopathy,  thyroid not palpable.  Cardiac:  Regular rate and rhythm without murmurs, rubs, or gallops. Carotids without bruits.  Lungs: End inspiratory and expiratory  wheezing throughout right lung fields, expiratory wheezing in left middle and lower lobes, a few rhonchi in right lower lobe. Anterior wheezing on right.  Neurologic:  No gross focal deficits.    Psychiatric:  Mood appropriate, memory intact.     Data Reviewed:  Additional history summarized (from old records or history from someone other than the patient or another healthcare provider) (2 TOTAL): Reviewed pulmonology note from 10/26/17; spirometry normal, UR response that mimics asthma.     Decision to obtain extra information (old records requested or history from another person or accessing Care Everywhere) (1 TOTAL): None.     Radiology tests summarized or ordered (XRAY/CT/MRI/DXA) (1 TOTAL): Reviewed soft tissue CT scan from 10/26/17; small right thyroid nodules, arthritis in C3-6, airway good.     Labs reviewed or ordered (1 TOTAL): None.    Medicine tests summarized or ordered (EKG/ECHO) (1 TOTAL): None.    Independent review of EKG or X-Ray (2 EACH): None.       The visit lasted a total of 15 minutes face to face with the patient. Over 50% of the time was spent counseling and educating the patient about cough and wheezing.    IBreanne, am scribing for and in the presence of, Dr. Wall.    IRoxy MD, personally performed the services described in this documentation, as scribed by Breanne Kothari in my presence, and it is both accurate and complete.    Medications:  Current Outpatient Prescriptions   Medication Sig Dispense Refill     albuterol (PROAIR HFA) 90 mcg/actuation inhaler INHALE 2 PUFFS BY INHALATION ROUTE EVERY 6 HOURS AS NEEDED FOR WHEEZING 8.5 g 1     amitriptyline (ELAVIL) 10 MG tablet Take 2 tablets (20 mg total) by mouth at bedtime. 180 tablet 3     ascorbic acid (VITAMIN C) 1000 MG tablet Take 1,000 mg by mouth daily.       atorvastatin (LIPITOR) 40 MG tablet TAKE 1 TABLET (40 MG) BY MOUTH ONCE DAILY 90 tablet 1     blood glucose test (CONTOUR NEXT STRIPS) strips  USE 1 EACH AS DIRECTED 2 TIMES A DAY. 100 strip 2     blood pressure monitor Kit Check BP couple times a week.  Goal <140/90   DX - HTN and Diabetes 1 each 0     cholecalciferol, vitamin D3, 1,000 unit tablet Take 1,000 Units by mouth daily.       fluticasone (FLONASE) 50 mcg/actuation nasal spray 1 spray each nostril daily 16 g 12     FOLIC ACID/MULTIVITS-MIN/LUT (CENTRUM SILVER ORAL) Take by mouth.       gabapentin (NEURONTIN) 300 MG capsule take 3 capsules by mouth three times daily  270 capsule 2     generic lancets (FINGERSTIX LANCETS) 1 each by In Vitro route 2 (two) times a day. 100 each 3     hydroCHLOROthiazide (HYDRODIURIL) 25 MG tablet TAKE ONE TABLET BY MOUTH ONE TIME DAILY  90 tablet 3     losartan (COZAAR) 100 MG tablet TAKE 1 TABLET  BY MOUTH ONCE DAILY 90 tablet 3     meloxicam (MOBIC) 15 MG tablet Take 1 tablet (15 mg total) by mouth daily. 90 tablet 0     metFORMIN (GLUCOPHAGE) 500 MG tablet TAKE 1 TABLET BY MOUTH 2 TIMES PER DAY  180 tablet 3     omega-3 fatty acids 1,000 mg cap Take by mouth.       traZODone (DESYREL) 50 MG tablet TAKE 1/2 TABLET (25 MG) BY MOUTH AT BEDTIME. MAY INCREASE TO 1 TABLET AT NIGHT IF NEEDED. 30 tablet 2     VOLTAREN 1 % Gel APPLY TO UPPER EXTREMITIES, 2 GM OF GEL TO AFFECTED AREA 4 TIMES DAILY.  DO NOT APPLY MORE THAN 8 GM DAILY TO ANY ONE AFFECTED JOINT. 100 g 3     azithromycin (ZITHROMAX) 250 MG tablet Take 500 mg (2 x 250 mg tablets) on day 1 followed by 250 mg (1 tablet) on days 2-5. 6 tablet 0     predniSONE (DELTASONE) 20 MG tablet Take 2 tablets (40 mg total) by mouth daily for 5 days. 10 tablet 0     No current facility-administered medications for this visit.        Total Data Points: 3

## 2021-06-16 PROBLEM — G62.9 PERIPHERAL POLYNEUROPATHY: Status: ACTIVE | Noted: 2020-08-06

## 2021-06-16 PROBLEM — R76.8 ELEVATED IGE LEVEL: Status: ACTIVE | Noted: 2018-04-26

## 2021-06-16 PROBLEM — R05.8 UPPER AIRWAY COUGH SYNDROME: Status: ACTIVE | Noted: 2017-10-26

## 2021-06-16 PROBLEM — J82.83 EOSINOPHILIC ASTHMA: Status: ACTIVE | Noted: 2019-03-08

## 2021-06-16 PROBLEM — Z78.0 ASYMPTOMATIC POSTMENOPAUSAL STATUS: Status: ACTIVE | Noted: 2020-08-06

## 2021-06-16 NOTE — TELEPHONE ENCOUNTER
Refill Approved    Rx renewed per Medication Renewal Policy. Medication was last renewed on 8/6/20.    Gabriel Tomlinson, [facility] Triage/Med Refill 4/2/2021     Requested Prescriptions   Pending Prescriptions Disp Refills     hydroCHLOROthiazide (HYDRODIURIL) 25 MG tablet [Pharmacy Med Name: hydroCHLOROthiazide Oral Tablet 25 MG] 90 tablet 0     Sig: TAKE 1 TABLET (25 MG) BY MOUTH ONCE DAILY       Diuretics/Combination Diuretics Refill Protocol  Passed - 4/1/2021  2:03 AM        Passed - Visit with PCP or prescribing provider visit in past 12 months     Last office visit with prescriber/PCP: 2/11/2021 Tsering Henson MD OR same dept: 2/11/2021 Tsering Henson MD OR same specialty: 2/11/2021 Tsering Henson MD  Last physical: 8/6/2020 Last MTM visit: Visit date not found   Next visit within 3 mo: Visit date not found  Next physical within 3 mo: Visit date not found  Prescriber OR PCP: Tsering Henson MD  Last diagnosis associated with med order: 1. Benign Essential Hypertension  - hydroCHLOROthiazide (HYDRODIURIL) 25 MG tablet [Pharmacy Med Name: hydroCHLOROthiazide Oral Tablet 25 MG]; TAKE 1 TABLET (25 MG) BY MOUTH ONCE DAILY  Dispense: 90 tablet; Refill: 0    2. Nonintractable headache, unspecified chronicity pattern, unspecified headache type  - amitriptyline (ELAVIL) 25 MG tablet [Pharmacy Med Name: Amitriptyline HCl Oral Tablet 25 MG]; Take 1 tablet (25 mg) by mouth nightly at bedtime  Dispense: 90 tablet; Refill: 0    3. Insomnia, unspecified type  - amitriptyline (ELAVIL) 25 MG tablet [Pharmacy Med Name: Amitriptyline HCl Oral Tablet 25 MG]; Take 1 tablet (25 mg) by mouth nightly at bedtime  Dispense: 90 tablet; Refill: 0    4. Hyperlipidemia, unspecified hyperlipidemia type  - atorvastatin (LIPITOR) 40 MG tablet [Pharmacy Med Name: Atorvastatin Calcium Oral Tablet 40 MG]; Take 1 tablet (40 mg) by mouth ONCE daily  Dispense: 90 tablet; Refill: 0    If protocol passes may refill for 12 months if  within 3 months of last provider visit (or a total of 15 months).             Passed - Serum Potassium in past 12 months      Lab Results   Component Value Date    Potassium 3.9 02/11/2021             Passed - Serum Sodium in past 12 months      Lab Results   Component Value Date    Sodium 139 02/11/2021             Passed - Blood pressure on file in past 12 months     BP Readings from Last 1 Encounters:   02/11/21 125/59             Passed - Serum Creatinine in past 12 months      Creatinine   Date Value Ref Range Status   02/11/2021 1.03 0.60 - 1.10 mg/dL Final                amitriptyline (ELAVIL) 25 MG tablet [Pharmacy Med Name: Amitriptyline HCl Oral Tablet 25 MG] 90 tablet 0     Sig: Take 1 tablet (25 mg) by mouth nightly at bedtime       Tricyclics/Misc Antidepressant/Antianxiety Meds Refill Protocol Passed - 4/1/2021  2:03 AM        Passed - PCP or prescribing provider visit in last year     Last office visit with prescriber/PCP: 2/11/2021 Tsering Henson MD OR same dept: 2/11/2021 Tsering Henson MD OR same specialty: 2/11/2021 Tsering Henson MD  Last physical: 8/6/2020 Last MTM visit: Visit date not found   Next visit within 3 mo: Visit date not found  Next physical within 3 mo: Visit date not found  Prescriber OR PCP: Tsering Henson MD  Last diagnosis associated with med order: 1. Benign Essential Hypertension  - hydroCHLOROthiazide (HYDRODIURIL) 25 MG tablet [Pharmacy Med Name: hydroCHLOROthiazide Oral Tablet 25 MG]; TAKE 1 TABLET (25 MG) BY MOUTH ONCE DAILY  Dispense: 90 tablet; Refill: 0    2. Nonintractable headache, unspecified chronicity pattern, unspecified headache type  - amitriptyline (ELAVIL) 25 MG tablet [Pharmacy Med Name: Amitriptyline HCl Oral Tablet 25 MG]; Take 1 tablet (25 mg) by mouth nightly at bedtime  Dispense: 90 tablet; Refill: 0    3. Insomnia, unspecified type  - amitriptyline (ELAVIL) 25 MG tablet [Pharmacy Med Name: Amitriptyline HCl Oral Tablet 25 MG]; Take 1 tablet  (25 mg) by mouth nightly at bedtime  Dispense: 90 tablet; Refill: 0    4. Hyperlipidemia, unspecified hyperlipidemia type  - atorvastatin (LIPITOR) 40 MG tablet [Pharmacy Med Name: Atorvastatin Calcium Oral Tablet 40 MG]; Take 1 tablet (40 mg) by mouth ONCE daily  Dispense: 90 tablet; Refill: 0    If protocol passes may refill for 12 months if within 3 months of last provider visit (or a total of 15 months).                atorvastatin (LIPITOR) 40 MG tablet [Pharmacy Med Name: Atorvastatin Calcium Oral Tablet 40 MG] 90 tablet 0     Sig: Take 1 tablet (40 mg) by mouth ONCE daily       Statins Refill Protocol (Hmg CoA Reductase Inhibitors) Passed - 4/1/2021  2:03 AM        Passed - PCP or prescribing provider visit in past 12 months      Last office visit with prescriber/PCP: 2/11/2021 Tsering Henson MD OR same dept: 2/11/2021 Tsering Henson MD OR same specialty: 2/11/2021 Tsering Henson MD  Last physical: 8/6/2020 Last MTM visit: Visit date not found   Next visit within 3 mo: Visit date not found  Next physical within 3 mo: Visit date not found  Prescriber OR PCP: Tsering Henson MD  Last diagnosis associated with med order: 1. Benign Essential Hypertension  - hydroCHLOROthiazide (HYDRODIURIL) 25 MG tablet [Pharmacy Med Name: hydroCHLOROthiazide Oral Tablet 25 MG]; TAKE 1 TABLET (25 MG) BY MOUTH ONCE DAILY  Dispense: 90 tablet; Refill: 0    2. Nonintractable headache, unspecified chronicity pattern, unspecified headache type  - amitriptyline (ELAVIL) 25 MG tablet [Pharmacy Med Name: Amitriptyline HCl Oral Tablet 25 MG]; Take 1 tablet (25 mg) by mouth nightly at bedtime  Dispense: 90 tablet; Refill: 0    3. Insomnia, unspecified type  - amitriptyline (ELAVIL) 25 MG tablet [Pharmacy Med Name: Amitriptyline HCl Oral Tablet 25 MG]; Take 1 tablet (25 mg) by mouth nightly at bedtime  Dispense: 90 tablet; Refill: 0    4. Hyperlipidemia, unspecified hyperlipidemia type  - atorvastatin (LIPITOR) 40 MG tablet  [Pharmacy Med Name: Atorvastatin Calcium Oral Tablet 40 MG]; Take 1 tablet (40 mg) by mouth ONCE daily  Dispense: 90 tablet; Refill: 0    If protocol passes may refill for 12 months if within 3 months of last provider visit (or a total of 15 months).

## 2021-06-16 NOTE — PROGRESS NOTES
DME Provider: Valeria  Date Faxed: 2/22/18  Ordering Provider: Dr. Moreno  Equipment ordered: Nebulizer machine and meds

## 2021-06-16 NOTE — PROGRESS NOTES
Patient instructed in use of symbicort inhaler with a spacer device.  Patient able to use the spacer device without any difficulty.  Return demo with teaching device.  Patient also shown nebulizer machine from [facility] and how to use the nebulizer machine.  Patient would like Rx for neb solution to go to [facility] as well.

## 2021-06-16 NOTE — PROGRESS NOTES
Pulmonary Clinic Follow-up Visit    Assessment and Plan:   Wendy Suárez is a 77 y.o. female with a history of HTN, DM, OA, HLD, referred for follow up of wheezing and recent diagnosis of asthma exacerbation. Overall she has declined since our last visit. 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 FENO today is 115ppb, markedly elevated.    We will need to optimize her inhaler regimen to try to improve her symptoms.      Recommendations:  -start Symbicort high dose WITH SPACER, rinse mouth after each use. Inhaler technique demonstrated in clinic  -prednisone 40mg daily for 5 days for acute asthma exacerbatio  -Continue albuterol as neede  -Rx for albuterol neb and neb machine to be used prn wheezing/shortness of breath.  -we may add singulair in the near future  -She is UTD with flu and PCV13, will give PSV23 today  -we may need to consider biologic therapy in the near future given her marked airway inflammation but will see how she does with above interventions first  -encouraged her to exercise and remain active as able.    Follow up in 4 weeks for reassessment     I appreciate the opportunity to participate in the care of Ms. Suárez.  Please feel free to contact me at any time.    CCx: follow up of asthma    HPI: Interim history: I last saw Wendy on 10/26/2017. Since that time, she had bronchitis requiring prednisone and antibiotics.  Still coughing and wheezing. Overall she feels like she's taken a significant downturn since our last visit. She does cough frequently but is not productive (it was previously productive in early January when she got the prednisone).  Was Exercising regularly before the cold, not so much these days.    ROS:  A 12-system review was obtained and was negative with the exception of the symptoms endorsed in the history of present illness.    PMH:  Past Medical History:   Diagnosis Date     Asthma      Cough      Hypertension         PSH:  Past Surgical History:   Procedure Laterality Date     HYSTERECTOMY Bilateral 1993     OOPHORECTOMY Bilateral 1993       Allergies:  Allergies   Allergen Reactions     Ace Inhibitors Cough     Lisinopril      Sulfa (Sulfonamide Antibiotics)        Family HX:  Family History   Problem Relation Age of Onset     Diabetes Father      Hypertension Father      Heart disease Paternal Grandfather        Social Hx:  Social History     Social History     Marital status:      Spouse name: N/A     Number of children: N/A     Years of education: N/A     Occupational History     Not on file.     Social History Main Topics     Smoking status: Former Smoker     Types: Cigarettes     Smokeless tobacco: Never Used      Comment: quit 55 years ago, social only     Alcohol use No     Drug use: Not on file     Sexual activity: Not on file     Other Topics Concern     Not on file     Social History Narrative       Current Meds:  Current Outpatient Prescriptions   Medication Sig Dispense Refill     albuterol (PROAIR HFA) 90 mcg/actuation inhaler INHALE 2 PUFFS BY INHALATION ROUTE EVERY 6 HOURS AS NEEDED FOR WHEEZING 8.5 g 1     amitriptyline (ELAVIL) 10 MG tablet Take 2 tablets (20 mg total) by mouth at bedtime. 180 tablet 3     ascorbic acid (VITAMIN C) 1000 MG tablet Take 1,000 mg by mouth daily.       atorvastatin (LIPITOR) 40 MG tablet TAKE 1 TABLET (40 MG) BY MOUTH ONCE DAILY 90 tablet 1     blood glucose test (CONTOUR NEXT STRIPS) strips USE 1 EACH AS DIRECTED 2 TIMES A DAY. 100 strip 2     blood pressure monitor Kit Check BP couple times a week.  Goal <140/90   DX - HTN and Diabetes 1 each 0     cholecalciferol, vitamin D3, 1,000 unit tablet Take 1,000 Units by mouth daily.       fluticasone (FLONASE) 50 mcg/actuation nasal spray 1 spray each nostril daily 16 g 12     FOLIC ACID/MULTIVITS-MIN/LUT (CENTRUM SILVER ORAL) Take by mouth.       gabapentin (NEURONTIN) 300 MG capsule take 3 capsules by mouth three times  daily 270 capsule 1     generic lancets (FINGERSTIX LANCETS) 1 each by In Vitro route 2 (two) times a day. 100 each 3     hydroCHLOROthiazide (HYDRODIURIL) 25 MG tablet TAKE ONE TABLET BY MOUTH ONE TIME DAILY  90 tablet 3     losartan (COZAAR) 100 MG tablet TAKE 1 TABLET  BY MOUTH ONCE DAILY 90 tablet 3     meloxicam (MOBIC) 15 MG tablet Take 1 tablet (15 mg total) by mouth daily. 90 tablet 0     metFORMIN (GLUCOPHAGE) 500 MG tablet TAKE 1 TABLET BY MOUTH 2 TIMES PER DAY  180 tablet 3     omega-3 fatty acids 1,000 mg cap Take by mouth.       traZODone (DESYREL) 50 MG tablet TAKE 1/2 TABLET (25 MG) BY MOUTH AT BEDTIME. MAY INCREASE TO 1 TABLET AT NIGHT IF NEEDED. 30 tablet 2     VOLTAREN 1 % Gel APPLY TO UPPER EXTREMITIES, 2 GM OF GEL TO AFFECTED AREA 4 TIMES DAILY.  DO NOT APPLY MORE THAN 8 GM DAILY TO ANY ONE AFFECTED JOINT. 100 g 3     predniSONE (DELTASONE) 20 MG tablet Take 2 tablets (40 mg total) by mouth daily. 10 tablet 0     No current facility-administered medications for this visit.        Physical Exam:  /64  Pulse 88  Resp 20  Wt 152 lb 4.8 oz (69.1 kg)  LMP 09/08/1989  SpO2 95% Comment: RA  BMI 26.14 kg/m2  Gen: awake, alert, oriented, no distress  HEENT: nasal turbinates are unremarkable, no oropharyngeal lesions, no cervical or supraclavicular lymphadenopathy  CV: RRR, no M/G/R  Resp: mild wheezing throughout lung fields with expiration. Good air movement. Coughing during exam.  Abd: soft, nontender, no palpable organomegaly  Skin: no apparent rashes  Ext: no cyanosis, clubbing or edema  Neuro: alert, nonfocal    Labs:  Previous labs were noted:  IgE was 260  Allergen panel negative  eos 7%   FENO today is 115ppb    Imaging studies:  Ct neck:     PFT's  Oct 2017:  FEV1/FVC is 77 and is normal.  FEV1 is 84% predicted and is normal.  FVC is 83% predicted and normal.  There was no improvement in spirometry after a single inhaled dose of bronchodilator.  TLC is 99% predicted and is normal.  RV  is 123% predicted and is normal.  DLCO is 84% predicted and is normal when it   is corrected for hemoglobin.     Impression:  Full Pulmonary Function Test is normal.    Adalid Moreno MD (Avi)  [facility] Pulmonary & Critical Care  Pager [phone]  Clinic [phone]

## 2021-06-16 NOTE — PROGRESS NOTES
Pulmonary Clinic Follow-up Visit    Assessment and Plan:   Wendy Suárez is a 80 year old lady with a history of HTN, DM, OA, HLD, here for follow up of moderate persistent asthma with evidence of significant eosinophilic airway inflammation (FENO as high as 115ppb at one point). Unfortunately, she was unable to tolerate 2 forms of ICS/LABA. Doing very well without any recent exacerbations, using PAULINO as needed.     Recommendations:  - Continue albuterol inhaler/neb as needed  - Refilled prednisone and azithromycin for her asthma action plan.  -She is UTD flu and pneumococcal vaccinations. She got both doses of COVID-19 vaccine as well. Recommend annual flu shot.  -encouraged her to exercise and remain active.     Follow up in 1 year or sooner if needed.    CCx: follow up of asthma with eosinophilic airway inflammation, annual follow up    HPI: Interim history: I last saw Wendy on 4/2/2020 on a virtual visit. Since that time, she reports she's been doing very well. 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  No exacerbations or ER visits over the last year.  Rare use of rescue inhaler.     FENO trend: 115ppb Feb 2018  -> 43ppb April 2018 -> 60ppb March 2019. -> not checked today as she is doing very well without symptoms.     ROS:  A 12-system review was obtained and was negative with the exception of the symptoms endorsed in the history of present illness.    PMH:  Past Medical History:   Diagnosis Date     Asthma     high IgE, elevated FENO 115 highest     Cough      Hypertension        PSH:  Past Surgical History:   Procedure Laterality Date     HYSTERECTOMY Bilateral 1993    was not cancer, for fibroids     OOPHORECTOMY Bilateral 1993       Allergies:  Allergies   Allergen Reactions     Lisinopril      cough     Sulfa (Sulfonamide Antibiotics)        Family HX:  Family History   Problem Relation Age of Onset     Diabetes Father      Hypertension Father      Heart disease Paternal  Grandfather      Alzheimer's disease Mother        Social Hx:  Social History     Socioeconomic History     Marital status:      Spouse name: Not on file     Number of children: Not on file     Years of education: Not on file     Highest education level: Not on file   Occupational History     Not on file   Social Needs     Financial resource strain: Not on file     Food insecurity     Worry: Not on file     Inability: Not on file     Transportation needs     Medical: Not on file     Non-medical: Not on file   Tobacco Use     Smoking status: Former Smoker     Packs/day: 0.00     Types: Cigarettes     Smokeless tobacco: Never Used     Tobacco comment: quit 55 years ago, social only   Substance and Sexual Activity     Alcohol use: Yes     Alcohol/week: 0.0 - 1.0 standard drinks     Drug use: Never     Sexual activity: Not Currently   Lifestyle     Physical activity     Days per week: Not on file     Minutes per session: Not on file     Stress: Not on file   Relationships     Social connections     Talks on phone: Not on file     Gets together: Not on file     Attends Judaism service: Not on file     Active member of club or organization: Not on file     Attends meetings of clubs or organizations: Not on file     Relationship status: Not on file     Intimate partner violence     Fear of current or ex partner: Not on file     Emotionally abused: Not on file     Physically abused: Not on file     Forced sexual activity: Not on file   Other Topics Concern     Not on file   Social History Narrative    Lives at [facility]. Does strength training 3 times a week.     Her  has been dealing with medical issues and is now in a wheelchair.         She has 3 kids. She drives her on her.         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        Current Meds:  Current Outpatient Medications   Medication Sig Dispense Refill     albuterol (PROAIR HFA) 90 mcg/actuation inhaler INHALE  2 PUFFS BY INHALATION ROUTE EVERY 6 HOURS AS NEEDED FOR WHEEZING 1 Inhaler 11     albuterol (PROVENTIL) 2.5 mg /3 mL (0.083 %) nebulizer solution Take 3 mL (2.5 mg total) by nebulization every 6 (six) hours as needed for wheezing. 1 vial 12     amitriptyline (ELAVIL) 10 MG tablet TAKE 1 TABLET BY MOUTH ONCE DAILY AT BEDTIME.  TAKE IN ADDITION TO 25 MG TABLET. 90 tablet 1     amitriptyline (ELAVIL) 25 MG tablet Take 1 tablet (25 mg) by mouth nightly at bedtime 90 tablet 3     ascorbic acid (VITAMIN C) 1000 MG tablet Take 1,000 mg by mouth daily.       atorvastatin (LIPITOR) 40 MG tablet Take 1 tablet (40 mg) by mouth ONCE daily 90 tablet 3     blood pressure monitor Kit Check BP couple times a week.  Goal <140/90   DX - HTN and Diabetes 1 each 0     blood-glucose meter (ACCU-CHEK LI PLUS METER) Misc Check sugars 2 times daily. DX DM E11.9 1 each 0     cholecalciferol, vitamin D3, 1,000 unit tablet Take 1,000 Units by mouth daily.       fluticasone propionate (FLONASE) 50 mcg/actuation nasal spray use 2 sprays each nostril daily. 16 g 2     FOLIC ACID/MULTIVITS-MIN/LUT (CENTRUM SILVER ORAL) Take by mouth.       gabapentin (NEURONTIN) 300 MG capsule Take 900 mg 3 times a day 810 capsule 2     hydroCHLOROthiazide (HYDRODIURIL) 25 MG tablet TAKE 1 TABLET (25 MG) BY MOUTH ONCE DAILY 90 tablet 3     losartan (COZAAR) 100 MG tablet Take 1 tablet (100 mg total) by mouth daily. 90 tablet 1     meloxicam (MOBIC) 15 MG tablet Take 1 tablet (15 mg) by mouth once daily 90 tablet 1     ONETOUCH VERIO TEST STRIPS strips test 2 times a day 200 strip 1     VOLTAREN 1 % Gel APPLY TO UPPER EXTREMITIES, 2 GM OF GEL TO AFFECTED AREA 4 TIMES DAILY.  DO NOT APPLY MORE THAN 8 GM DAILY TO ANY ONE AFFECTED JOINT. 100 g 3     azithromycin (ZITHROMAX) 250 MG tablet Take 500 mg (2 x 250 mg tablets) on day 1 followed by 250 mg (1 tablet) on days 2-5. 6 tablet 3     predniSONE (DELTASONE) 20 MG tablet Take 20 mg by mouth daily. 30 tablet 0      No current facility-administered medications for this visit.        Physical Exam:  /60   Pulse 87   Wt 149 lb 3.2 oz (67.7 kg)   LMP 09/08/1989   SpO2 97% Comment: RA  BMI 26.85 kg/m    Gen: awake, alert, oriented, no distress  HEENT: nasal turbinates are unremarkable, no oropharyngeal lesions, no cervical or supraclavicular lymphadenopathy  CV: RRR, no M/G/R  Resp: CTAB, no focal crackles or wheezes  Abd: soft, nontender, no palpable organomegaly  Skin: no apparent rashes  Ext: no cyanosis, clubbing or edema  Neuro: alert, nonfocal    Labs:  IgE 260 Oct 2017  CBC NO eos Feb 2021 (previously had eos 7% in Oct 2017, Abs 700)  [place] allergy panel was negative  ILD w/u neg 2014    Imaging studies:  XR CHEST PA AND LATERAL3/8/2017 9:32 AMINDICATION: Cough and end inspiratoy wheeze on right.  Eval for any infiltrateCOMPARISON: None.FINDINGS: Lungs are mildly hyperinflated but clear. Heart size normal. Degenerative changes in the spine.This report was   electronically interpreted by: Dr. Gabriel Powers MD ON 03/08/2017 at 12:08    PFT's  Oct 2017  FEV1/FVC is 77 and is normal.  FEV1 is 84% predicted and is normal.  FVC is 83% predicted and normal.  There was no improvement in spirometry after a single inhaled dose of bronchodilator.  TLC is 99% predicted and is normal.  RV is 123% predicted and is normal.  DLCO is 84% predicted and is normal when it   is corrected for hemoglobin.     Impression:  Full Pulmonary Function Test is normal.    Adalid Moreno MD (Avi)  [facility] Pulmonary & Critical Care  Pager [phone]  Clinic [phone]

## 2021-06-17 NOTE — TELEPHONE ENCOUNTER
RN cannot approve Refill Request    RN can NOT refill this medication med is not covered by policy/route to provider. Last office visit: 2/11/2021 Tsering Henson MD Last Physical: 8/6/2020 Last MTM visit: Visit date not found Last visit same specialty: 2/11/2021 Tsering Henson MD.  Next visit within 3 mo: Visit date not found  Next physical within 3 mo: Visit date not found      Gabriel Tomlinson, [facility] Triage/Med Refill 4/29/2021    Requested Prescriptions   Pending Prescriptions Disp Refills     meloxicam (MOBIC) 15 MG tablet [Pharmacy Med Name: Meloxicam Oral Tablet 15 MG] 90 tablet 0     Sig: Take 1 tablet (15 mg) by mouth once daily       There is no refill protocol information for this order      Signed Prescriptions Disp Refills    amitriptyline (ELAVIL) 10 MG tablet 90 tablet 3     Sig: TAKE 1 TABLET BY MOUTH ONCE DAILY AT BEDTIME.  TAKE IN ADDITION TO 25 MG TABLET.       Tricyclics/Misc Antidepressant/Antianxiety Meds Refill Protocol Passed - 4/28/2021  2:00 AM        Passed - PCP or prescribing provider visit in last year     Last office visit with prescriber/PCP: 2/11/2021 Tsering Henson MD OR same dept: 2/11/2021 Tsering Henson MD OR same specialty: 2/11/2021 Tsering Henson MD  Last physical: 8/6/2020 Last MTM visit: Visit date not found   Next visit within 3 mo: Visit date not found  Next physical within 3 mo: Visit date not found  Prescriber OR PCP: Tsering Henson MD  Last diagnosis associated with med order: 1. Insomnia, unspecified type  - amitriptyline (ELAVIL) 10 MG tablet; TAKE 1 TABLET BY MOUTH ONCE DAILY AT BEDTIME.  TAKE IN ADDITION TO 25 MG TABLET.  Dispense: 90 tablet; Refill: 3    2. Osteoarthritis of both hands, unspecified osteoarthritis type  - meloxicam (MOBIC) 15 MG tablet [Pharmacy Med Name: Meloxicam Oral Tablet 15 MG]; Take 1 tablet (15 mg) by mouth once daily  Dispense: 90 tablet; Refill: 0    If protocol passes may refill for 12 months if within 3 months of  last provider visit (or a total of 15 months).

## 2021-06-17 NOTE — TELEPHONE ENCOUNTER
Refill Approved    Rx renewed per Medication Renewal Policy. Medication was last renewed on 8/6/20.    Gabriel Tomlinson, [facility] Triage/Med Refill 4/29/2021     Requested Prescriptions   Pending Prescriptions Disp Refills     amitriptyline (ELAVIL) 10 MG tablet [Pharmacy Med Name: Amitriptyline HCl Oral Tablet 10 MG] 90 tablet 0     Sig: TAKE 1 TABLET BY MOUTH ONCE DAILY AT BEDTIME.  TAKE IN ADDITION TO 25 MG TABLET.       Tricyclics/Misc Antidepressant/Antianxiety Meds Refill Protocol Passed - 4/28/2021  2:00 AM        Passed - PCP or prescribing provider visit in last year     Last office visit with prescriber/PCP: 2/11/2021 Tsering Henson MD OR same dept: 2/11/2021 Tsering Henson MD OR same specialty: 2/11/2021 Tsering Henson MD  Last physical: 8/6/2020 Last MTM visit: Visit date not found   Next visit within 3 mo: Visit date not found  Next physical within 3 mo: Visit date not found  Prescriber OR PCP: Tsering Henson MD  Last diagnosis associated with med order: 1. Insomnia, unspecified type  - amitriptyline (ELAVIL) 10 MG tablet [Pharmacy Med Name: Amitriptyline HCl Oral Tablet 10 MG]; TAKE 1 TABLET BY MOUTH ONCE DAILY AT BEDTIME.  TAKE IN ADDITION TO 25 MG TABLET.  Dispense: 90 tablet; Refill: 0    2. Osteoarthritis of both hands, unspecified osteoarthritis type  - meloxicam (MOBIC) 15 MG tablet [Pharmacy Med Name: Meloxicam Oral Tablet 15 MG]; Take 1 tablet (15 mg) by mouth once daily  Dispense: 90 tablet; Refill: 0    If protocol passes may refill for 12 months if within 3 months of last provider visit (or a total of 15 months).                meloxicam (MOBIC) 15 MG tablet [Pharmacy Med Name: Meloxicam Oral Tablet 15 MG] 90 tablet 0     Sig: Take 1 tablet (15 mg) by mouth once daily       There is no refill protocol information for this order

## 2021-06-17 NOTE — PROGRESS NOTES
Subjective:      Patient ID: Wendy Suárez is a 77 y.o. female.    Chief Complaint:    Sore Throat    This is a new (Having sore ness to her throat since about 5 days ago.Also having running nose about same duration.Has been using tylenol and gargling salt water to help ) problem. The current episode started in the past 7 days (5 days ago). The problem has been gradually worsening. There has been no fever. The pain is mild. Associated symptoms include a hoarse voice. Pertinent negatives include no congestion, coughing, ear discharge, headaches, neck pain, shortness of breath, stridor or trouble swallowing. She has had no exposure to strep or mono. She has tried acetaminophen for the symptoms. The treatment provided mild relief.   She has a history of Asthma and on Breo noted using the inhaler as advised including rinsing her mouth.     The following portions of the patient's history were reviewed and updated as appropriate: allergies, current medications, past family history, past medical history, past social history, past surgical history and problem list.       Past Medical History:   Diagnosis Date     Asthma      Cough      Hypertension        Past Surgical History:   Procedure Laterality Date     HYSTERECTOMY Bilateral 1993     OOPHORECTOMY Bilateral 1993       Family History   Problem Relation Age of Onset     Diabetes Father      Hypertension Father      Heart disease Paternal Grandfather        Social History   Substance Use Topics     Smoking status: Former Smoker     Types: Cigarettes     Smokeless tobacco: Never Used      Comment: quit 55 years ago, social only     Alcohol use No       Review of Systems   Constitutional: Negative for activity change, appetite change and fever.   HENT: Positive for hoarse voice, postnasal drip and sore throat. Negative for congestion, ear discharge, rhinorrhea, sinus pain, sinus pressure and trouble swallowing.    Respiratory: Negative for cough, chest tightness, shortness  of breath and stridor.    Cardiovascular: Negative for leg swelling.   Musculoskeletal: Negative for neck pain.   Neurological: Negative for light-headedness and headaches.       Objective:     /48 (Patient Site: Right Arm, Patient Position: Sitting)  Pulse 89  Temp 98  F (36.7  C) (Oral)   Resp 16  Wt 153 lb 8 oz (69.6 kg)  LMP 09/08/1989  SpO2 97%  BMI 26.35 kg/m2    Physical Exam   Constitutional: She appears well-developed and well-nourished. No distress.   HENT:   Right Ear: Tympanic membrane normal.   Left Ear: Tympanic membrane normal.   Nose: No rhinorrhea or sinus tenderness. Right sinus exhibits no maxillary sinus tenderness and no frontal sinus tenderness. Left sinus exhibits no maxillary sinus tenderness and no frontal sinus tenderness.   Mouth/Throat: Oropharynx is clear and moist.   No signs of Thrush and no noted erythema.   Cardiovascular: Normal rate and regular rhythm.    Pulmonary/Chest: Effort normal and breath sounds normal.   Neurological: She is alert.       Assessment:     Procedures    1. Throat pain  - Rapid Strep A Screen-Throat  - Group A Strep, RNA Direct Detection, Throat    2. Post-nasal discharge      Plan:   Strep was negative. I think the cause of the symptoms is the post nasal drip.Advised to increase fluids intake,take tylenol for the pain and irritation and continue to gargle as needed.Follow up if not better.

## 2021-06-17 NOTE — PROGRESS NOTES
Pulmonary Clinic Follow-up Visit    Assessment and Plan:   Wendy Suárez is a 77 y.o. 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 Unfortunately, she was unable to tolerate 2 forms of ICS/LABA. Despite this, she is doing well with significant improvement in JOAQUIN from 115 to 43ppb today. She is still at high risk for recurrent asthma exacerbations and I think she would be a good candidate for biologic therapies for eosinophilic asthma.      Recommendations:  - she is no longer on Symbicort or Breo  - start singulair 10mg daily  -Continue albuterol as needed  -referral to Dr. Hurtado from allergy/immunology to discuss biologic therapy for asthma  -She is UTD with flu shot, PSV23 and PCV13  -encouraged her to exercise and remain active as able.    Follow up in 3 months.    CCx: follow up of asthma with eosinophilic airway inflammation    HPI: Interim history: I last saw Wendy on 2/22. Since that time, she's had an additional exacerbation requiring prednisone. We did start her on Symbicort with spacer but this was switched to Breo due to trouble with voice hoarseness. The breo then caused her to develop white spots/sores in her mouth. Today she says she is feeling well. Recently had flu and sore throat. Nebulizers helped. Currently no coughing. Rarely uses albuterol these days.    FENO trend: 115ppb -> 43ppb today.    ROS:  A 12-system review was obtained and was negative with the exception of the symptoms endorsed in the history of present illness.    PMH:  Past Medical History:   Diagnosis Date     Asthma      Cough      Hypertension        PSH:  Past Surgical History:   Procedure Laterality Date     HYSTERECTOMY Bilateral 1993     OOPHORECTOMY Bilateral 1993       Allergies:  Allergies   Allergen Reactions     Lisinopril      cough     Sulfa (Sulfonamide Antibiotics)        Family HX:  Family  History   Problem Relation Age of Onset     Diabetes Father      Hypertension Father      Heart disease Paternal Grandfather        Social Hx:  Social History     Social History     Marital status:      Spouse name: N/A     Number of children: N/A     Years of education: N/A     Occupational History     Not on file.     Social History Main Topics     Smoking status: Former Smoker     Types: Cigarettes     Smokeless tobacco: Never Used      Comment: quit 55 years ago, social only     Alcohol use No     Drug use: Not on file     Sexual activity: Not on file     Other Topics Concern     Not on file     Social History Narrative       Current Meds:  Current Outpatient Prescriptions   Medication Sig Dispense Refill     albuterol (PROAIR HFA) 90 mcg/actuation inhaler INHALE 2 PUFFS BY INHALATION ROUTE EVERY 6 HOURS AS NEEDED FOR WHEEZING 1 Inhaler 11     amitriptyline (ELAVIL) 10 MG tablet Take 2 tablets (20 mg total) by mouth at bedtime. 180 tablet 3     ascorbic acid (VITAMIN C) 1000 MG tablet Take 1,000 mg by mouth daily.       atorvastatin (LIPITOR) 40 MG tablet TAKE 1 TABLET (40 MG) BY MOUTH ONCE DAILY 90 tablet 1     blood glucose test (CONTOUR NEXT STRIPS) strips USE 1 EACH AS DIRECTED 2 TIMES A DAY. 100 strip 2     blood pressure monitor Kit Check BP couple times a week.  Goal <140/90   DX - HTN and Diabetes 1 each 0     cholecalciferol, vitamin D3, 1,000 unit tablet Take 1,000 Units by mouth daily.       fluticasone (FLONASE) 50 mcg/actuation nasal spray 1 spray each nostril daily 16 g 12     FOLIC ACID/MULTIVITS-MIN/LUT (CENTRUM SILVER ORAL) Take by mouth.       gabapentin (NEURONTIN) 300 MG capsule take 3 capsules by mouth three times daily 810 capsule 1     generic lancets (FINGERSTIX LANCETS) 1 each by In Vitro route 2 (two) times a day. 100 each 3     hydroCHLOROthiazide (HYDRODIURIL) 25 MG tablet TAKE ONE TABLET BY MOUTH ONE TIME DAILY  90 tablet 3     losartan (COZAAR) 100 MG tablet TAKE 1 TABLET  BY  MOUTH ONCE DAILY 90 tablet 3     meloxicam (MOBIC) 15 MG tablet Take 1 tablet (15 mg total) by mouth daily. 90 tablet 0     metFORMIN (GLUCOPHAGE) 500 MG tablet TAKE 1 TABLET BY MOUTH 2 TIMES PER DAY  180 tablet 3     omega-3 fatty acids 1,000 mg cap Take by mouth.       traZODone (DESYREL) 50 MG tablet TAKE 1/2 TABLET (25 MG) BY MOUTH AT BEDTIME. MAY INCREASE TO 1 TABLET AT NIGHT IF NEEDED. 30 tablet 2     VOLTAREN 1 % Gel APPLY TO UPPER EXTREMITIES, 2 GM OF GEL TO AFFECTED AREA 4 TIMES DAILY.  DO NOT APPLY MORE THAN 8 GM DAILY TO ANY ONE AFFECTED JOINT. 100 g 3     albuterol (PROVENTIL) 2.5 mg /3 mL (0.083 %) nebulizer solution Take 3 mL (2.5 mg total) by nebulization every 6 (six) hours as needed for wheezing. 380 mL 11     fluticasone-vilanterol (BREO ELLIPTA) 200-25 mcg/dose DsDv inhaler Inhale 1 puff daily. 1 each 6     montelukast (SINGULAIR) 10 mg tablet Take 1 tablet (10 mg total) by mouth at bedtime. 30 tablet 6     No current facility-administered medications for this visit.        Physical Exam:  /60  Pulse 81  Resp 17  Wt 149 lb (67.6 kg)  LMP 09/08/1989  SpO2 95% Comment: RA  BMI 25.58 kg/m2  Gen: awake, alert, oriented, no distress  HEENT: nasal turbinates are unremarkable, no oropharyngeal lesions, no cervical or supraclavicular lymphadenopathy  CV: RRR, no M/G/R  Resp: CTAB, no focal crackles or wheezes  Abd: soft, nontender, no palpable organomegaly  Skin: no apparent rashes  Ext: no cyanosis, clubbing or edema  Neuro: alert, nonfocal    Labs:  IgE 211  FENO 115 last visit  eos 7% in Oct 2017  [place] allergy panel was negative    Imaging studies:  reviewed    PFT's  Oct 2017  FEV1/FVC is 77 and is normal.  FEV1 is 84% predicted and is normal.  FVC is 83% predicted and normal.  There was no improvement in spirometry after a single inhaled dose of bronchodilator.  TLC is 99% predicted and is normal.  RV is 123% predicted and is normal.  DLCO is 84% predicted and is normal when it   is  corrected for hemoglobin.     Impression:  Full Pulmonary Function Test is normal.    Adalid Moreno MD (Avi)  [facility]  Pager [phone]  Clinic [phone]

## 2021-06-17 NOTE — PROGRESS NOTES
Assessment & Plan:  1. Type II diabetes mellitus  Patient with type 2 diabetes under good control.  She is due for annual labs.  And continue the metformin.  Reminded her importance of regular exercise as well as healthy diet options  - Glycosylated Hemoglobin A1c  - Lipid Cascade  - Comprehensive Metabolic Panel    2. Moderate persistent asthma without complication  Patient appears to have asthma.  She is followed by pulmonology.  She is not liking to use the inhalers.  She feels her wheezing is triggered by allergic symptoms.  She is seeing the pulmonologist in the next week I asked her to see if she would be a candidate for Singulair.        Orders Placed This Encounter   Procedures     Glycosylated Hemoglobin A1c     Lipid Cascade     Order Specific Question:   Fasting is required?     Answer:   Yes     Comprehensive Metabolic Panel     Medications Discontinued During This Encounter   Medication Reason     predniSONE (DELTASONE) 20 MG tablet        No Follow-up on file.    I have had an Advance Directives discussion with the patient.    Chief Complaint:   Chief Complaint   Patient presents with     Diabetes     check up        History of Present Illness:  Wendy is a 77 y.o. female presenting to the clinic today for diabetes. Her A1c is 6.5 today. She had the flu in early April of 2018. She had checked her blood glucose during her illness and recalls it being high. She is taking metformin which is working well.     Pulmonology: She was seen on 2/22/18 by pulmonology and was prescribed Symbicort. She notes the inhaler made her voice hoarse, so she stopped taking the medication. She then was prescribed Brio, which caused her to develop white sores on her tongue and mouth. She actively rinsed her mouth after using the inhaler as instructed. She began rinsing her mouth with hydrogen peroxide when she developed the spots, but found that this did not help and the sores spread all over her mouth. She notes that Benadryl  resolved the sores, and today they are nearly gone. The roof of her mouth and her tongue continue to be sore and raw, especially when she eats. She recalls starting Brio on 3/8/18, and the spots onset two days later. She has not used the inhaler in 5 days and has been taking benadryl and rinsing her mouth with hydrogen peroxide daily. She states that she would rather not use an inhaler for her chronic wheezing and cough. She is following up with pulmonology tomorrow. She has had relief from a nebulizer in the past, as well as an albuterol inhaler.     Review of Systems:  She had body aches and a fever of 102. She had a sore throat and was seen by urgent care at this time. She does not use trazodone frequently due to dry mouth.  All other systems are negative.     PFSH:    Tobacco Use:  History   Smoking Status     Former Smoker     Types: Cigarettes   Smokeless Tobacco     Never Used     Comment: quit 55 years ago, social only       Vitals:  Vitals:    04/25/18 0826   BP: 124/56   Patient Site: Right Arm   Patient Position: Sitting   Cuff Size: Adult Regular   Pulse: 66   Resp: 16   Temp: 98.2  F (36.8  C)   TempSrc: Oral   Weight: 150 lb (68 kg)     Wt Readings from Last 3 Encounters:   04/26/18 149 lb (67.6 kg)   04/25/18 150 lb (68 kg)   03/31/18 153 lb 8 oz (69.6 kg)     Body mass index is 25.75 kg/(m^2).      Physical Exam:  Constitutional:  Reveals an alert, cooperative, pleasant female in no acute distress.  Vitals:  Per nursing notes.  Ears:  Clear.  Oropharynx:  Without posterior nasal drainage or thrush. Healing fissures on sides of tongue. No ulcers.   Neck:  Supple, no lymphadenopathy,  thyroid not palpable.  Cardiac:  Regular rate and rhythm without murmurs, rubs, or gallops.    Lungs: Clear.  Respiratory effort normal.  Neurologic:  No gross focal deficits.    Psychiatric:  Mood appropriate, memory intact.     Data Reviewed:  Additional history summarized (from old records or history from someone other  than the patient or another healthcare provider) (2 TOTAL): Reviewed note from 2/22/18 regarding pulmonology, as well as phone encounters from 3/5/18, 3/8/18 and 3/9/18. Reviewed note from 3/31/18 regarding walk in clinic and recent cold.      Decision to obtain extra information (old records requested or history from another person or accessing Care Everywhere) (1 TOTAL): None.     Radiology tests summarized or ordered (XRAY/CT/MRI/DXA) (1 TOTAL): None.    Labs reviewed or ordered (1 TOTAL): Reviewed and ordered labs.     Medicine tests summarized or ordered (EKG/ECHO) (1 TOTAL): None.    Independent review of EKG or X-Ray (2 EACH): None.       The visit lasted a total of 19 minutes face to face with the patient. Over 50% of the time was spent counseling and educating the patient about medication management.     I, Natasha Andres, am scribing for and in the presence of, Dr. Wall.    I, Roxy Wall MD, personally performed the services described in this documentation, as scribed by Natasha Andres in my presence, and it is both accurate and complete.    Medications:  Current Outpatient Prescriptions   Medication Sig Dispense Refill     albuterol (PROAIR HFA) 90 mcg/actuation inhaler INHALE 2 PUFFS BY INHALATION ROUTE EVERY 6 HOURS AS NEEDED FOR WHEEZING 1 Inhaler 11     amitriptyline (ELAVIL) 10 MG tablet Take 2 tablets (20 mg total) by mouth at bedtime. 180 tablet 3     ascorbic acid (VITAMIN C) 1000 MG tablet Take 1,000 mg by mouth daily.       atorvastatin (LIPITOR) 40 MG tablet TAKE 1 TABLET (40 MG) BY MOUTH ONCE DAILY 90 tablet 1     blood glucose test (CONTOUR NEXT STRIPS) strips USE 1 EACH AS DIRECTED 2 TIMES A DAY. 100 strip 2     blood pressure monitor Kit Check BP couple times a week.  Goal <140/90   DX - HTN and Diabetes 1 each 0     cholecalciferol, vitamin D3, 1,000 unit tablet Take 1,000 Units by mouth daily.       fluticasone (FLONASE) 50 mcg/actuation nasal spray 1 spray each nostril daily 16 g 12      FOLIC ACID/MULTIVITS-MIN/LUT (CENTRUM SILVER ORAL) Take by mouth.       gabapentin (NEURONTIN) 300 MG capsule take 3 capsules by mouth three times daily 810 capsule 1     generic lancets (FINGERSTIX LANCETS) 1 each by In Vitro route 2 (two) times a day. 100 each 3     hydroCHLOROthiazide (HYDRODIURIL) 25 MG tablet TAKE ONE TABLET BY MOUTH ONE TIME DAILY  90 tablet 3     losartan (COZAAR) 100 MG tablet TAKE 1 TABLET  BY MOUTH ONCE DAILY 90 tablet 3     meloxicam (MOBIC) 15 MG tablet Take 1 tablet (15 mg total) by mouth daily. 90 tablet 0     metFORMIN (GLUCOPHAGE) 500 MG tablet TAKE 1 TABLET BY MOUTH 2 TIMES PER DAY  180 tablet 3     omega-3 fatty acids 1,000 mg cap Take by mouth.       traZODone (DESYREL) 50 MG tablet TAKE 1/2 TABLET (25 MG) BY MOUTH AT BEDTIME. MAY INCREASE TO 1 TABLET AT NIGHT IF NEEDED. 30 tablet 2     VOLTAREN 1 % Gel APPLY TO UPPER EXTREMITIES, 2 GM OF GEL TO AFFECTED AREA 4 TIMES DAILY.  DO NOT APPLY MORE THAN 8 GM DAILY TO ANY ONE AFFECTED JOINT. 100 g 3     albuterol (PROVENTIL) 2.5 mg /3 mL (0.083 %) nebulizer solution Take 3 mL (2.5 mg total) by nebulization every 6 (six) hours as needed for wheezing. 380 mL 11     fluticasone-vilanterol (BREO ELLIPTA) 200-25 mcg/dose DsDv inhaler Inhale 1 puff daily. 1 each 6     montelukast (SINGULAIR) 10 mg tablet Take 1 tablet (10 mg total) by mouth at bedtime. 30 tablet 6     No current facility-administered medications for this visit.        Total Data Points: 3

## 2021-06-18 NOTE — PROGRESS NOTES
Chief complaint: Referral    History of present illness: This is a pleasant 77-year-old woman who is here today for evaluation of allergies.  She states that she was diagnosed recently with asthma.  She has been told previously that she may have some asthma was not formally diagnosed until this last fall.  She states every winter she gets sick.  She states sometimes she will require Zithromax and prednisone pack.  She states this happened to her this past October.  She was seen by a different primary care provider in July of last year and her provider noted that she was having some extra sounds on her lung exam.  Referral was made to pulmonary and she saw him October.  At that time she had contracted an illness and was wheezing.  She states that she was given an albuterol inhaler to use she was also given Symbicort.  She reports that Symbicort caused her to lose her voice.  She then was transitioned to Breo that caused her to develop sores in her mouth.  For this reason she was taken off controller medications.  I was contacted by her pulmonologist thinking that perhaps she was a candidate for biologic therapies given that she is not able to use controller medications..  She reports currently that she has no cough, wheeze or shortness of breath.  Recent pulmonary function tests done in October of last year were normal with no reversibility on spirometry.  Exhaled nitric oxide measurement was elevated and did decrease to 43.  Previously was greater than 100.  She has a nebulizer machine at home and believes that the albuterol nebulized seems to provide better relief than albuterol inhaler.  She was using a corrugated tube for her Symbicort spacer.  She does not think she has allergies.  She states occasionally she has nasal congestion but not much drainage.  She is currently on montelukast.  She has not been on antihistamines.  Specific IgE testing was done in October which was negative.  She is also taking Flonase.   She is not sure if making much of a difference in symptoms.  She has no current cough, wheeze or shortness of breath and has not had so for some months now.  Total IgE was 206.    Past medical history: Hypertension, diabetes, osteoarthritis,     Social history: She is retired lives in apartment with central air basement, non-smoking environment, non-smoker, no pets, no exposure to mold    Family history: Son with asthma and son and grandchildren with allergies      Review of Systems performed as above and the remainder is negative.      Current Outpatient Prescriptions:      albuterol (PROAIR HFA) 90 mcg/actuation inhaler, INHALE 2 PUFFS BY INHALATION ROUTE EVERY 6 HOURS AS NEEDED FOR WHEEZING, Disp: 1 Inhaler, Rfl: 11     amitriptyline (ELAVIL) 10 MG tablet, Take 2 tablets (20 mg total) by mouth at bedtime., Disp: 180 tablet, Rfl: 3     ascorbic acid (VITAMIN C) 1000 MG tablet, Take 1,000 mg by mouth daily., Disp: , Rfl:      atorvastatin (LIPITOR) 40 MG tablet, TAKE 1 TABLET (40 MG) BY MOUTH ONCE DAILY, Disp: 90 tablet, Rfl: 1     blood glucose test (CONTOUR NEXT STRIPS) strips, USE 1 EACH AS DIRECTED 2 TIMES A DAY., Disp: 100 strip, Rfl: 2     blood pressure monitor Kit, Check BP couple times a week.  Goal <140/90   DX - HTN and Diabetes, Disp: 1 each, Rfl: 0     cholecalciferol, vitamin D3, 1,000 unit tablet, Take 1,000 Units by mouth daily., Disp: , Rfl:      fluticasone (FLONASE) 50 mcg/actuation nasal spray, 1 spray each nostril daily, Disp: 16 g, Rfl: 12     FOLIC ACID/MULTIVITS-MIN/LUT (CENTRUM SILVER ORAL), Take by mouth., Disp: , Rfl:      gabapentin (NEURONTIN) 300 MG capsule, take 3 capsules by mouth three times daily, Disp: 810 capsule, Rfl: 1     generic lancets (FINGERSTIX LANCETS), 1 each by In Vitro route 2 (two) times a day., Disp: 100 each, Rfl: 3     hydroCHLOROthiazide (HYDRODIURIL) 25 MG tablet, TAKE ONE TABLET BY MOUTH ONE TIME DAILY , Disp: 90 tablet, Rfl: 3     losartan (COZAAR) 100 MG  "tablet, TAKE 1 TABLET  BY MOUTH ONCE DAILY, Disp: 90 tablet, Rfl: 3     meloxicam (MOBIC) 15 MG tablet, Take 1 tablet (15 mg total) by mouth daily., Disp: 90 tablet, Rfl: 0     metFORMIN (GLUCOPHAGE) 500 MG tablet, TAKE 1 TABLET BY MOUTH 2 TIMES PER DAY , Disp: 180 tablet, Rfl: 3     montelukast (SINGULAIR) 10 mg tablet, Take 1 tablet (10 mg total) by mouth at bedtime., Disp: 30 tablet, Rfl: 6     omega-3 fatty acids 1,000 mg cap, Take by mouth., Disp: , Rfl:      traZODone (DESYREL) 50 MG tablet, TAKE 1/2 TABLET (25 MG) BY MOUTH AT BEDTIME. MAY INCREASE TO 1 TABLET AT NIGHT IF NEEDED., Disp: 30 tablet, Rfl: 2     VOLTAREN 1 % Gel, APPLY TO UPPER EXTREMITIES, 2 GM OF GEL TO AFFECTED AREA 4 TIMES DAILY.  DO NOT APPLY MORE THAN 8 GM DAILY TO ANY ONE AFFECTED JOINT., Disp: 100 g, Rfl: 3     albuterol (PROVENTIL) 2.5 mg /3 mL (0.083 %) nebulizer solution, Take 3 mL (2.5 mg total) by nebulization every 6 (six) hours as needed for wheezing., Disp: 380 mL, Rfl: 11     fluticasone-vilanterol (BREO ELLIPTA) 200-25 mcg/dose DsDv inhaler, Inhale 1 puff daily., Disp: 1 each, Rfl: 6    Allergies   Allergen Reactions     Lisinopril      cough     Sulfa (Sulfonamide Antibiotics)        /60  Pulse 84  Ht 5' 4\" (1.626 m)  Wt 151 lb (68.5 kg)  LMP 09/08/1989  BMI 25.92 kg/m2  Gen: Pleasant female not in acute distress  HEENT: Eyes no erythema of the bulbar or palpebral conjunctiva, no edema. Ears: TMs well visualized, no effusions. Nose: No congestion, mucosa normal. Mouth: Throat clear, no lip or tongue edema.   Cardiac: Regular rate and rhythm, no murmurs, rubs or gallops  Respiratory: Clear to auscultation bilaterally, no adventitious breath sounds  Lymph: No supraclavicular or cervical lymphadenopathy  Skin: No rashes or lesions  Psych: Alert and oriented times 3    Last Percutaneous Allergy Test Results  Trees  [place], White  1:20 H  (W/F in mm): 0-0 (05/17/18 1408)  Birch Mix 1:20 H (W/F in mm): 0-0 (05/17/18 " 1408)  [place], Common 1:20 H (W/F in mm): 0-0 (05/17/18 1408)  Elm, American 1:20 H (W/F in mm): 0-0 (05/17/18 1408)  [place], Jyotiark 1:20 H (W/F in mm): 0-0 (05/17/18 1408)  Maple, Hard/Sugar 1:20 H (W/F in mm): 0-0 (05/17/18 1408)  [place] Mix 1:20 H (W/F in mm): 0-0 (05/17/18 1408)  Oak, Red 1:20 H (W/F in mm): 0-0 (05/17/18 1408)  [place], American 1:20 H (W/F in mm): 0-0 (05/17/18 1408)  Pool Tree 1:20 H (W/F in mm): 0-0 (05/17/18 1408)  Dust Mites  D. Pteronyssinus Mite 30,000 AU/ML H (W/F in mm): 0-0 (05/17/18 1408)  D. Farinae Mite 30,000 AU/ML H (W/F in mm: 0-0 (05/17/18 1408)  Grasses  Grass Mix #4 10,000 BAU/ML H: 0-0 (05/17/18 1408)  Jeramy Grass 1:20 H (W/F in mm): 0-0 (05/17/18 1408)  Cockroach  Cockroach Mix 1:10 H (W/F in mm): 0-0 (05/17/18 1408)  Molds/Fungi  Alternaria Tenuis 1:10 H (W/F in mm): 0-0 (05/17/18 1408)  Aspergillus Fumigatus 1:10 H (W/F in mm): 0-0 (05/17/18 1408)  Homodendrum Cladosporioides 1:10 H (W/F in mm): 0-0 (05/17/18 1408)  Penicillin Notatum 1:10 H (W/F in mm): 0-0 (05/17/18 1408)  Epicoccum 1:10 H (W/F in mm): 0-0 (05/17/18 1408)  Weeds  Ragweed, Short 1:20 H (W/F in mm): 0-0 (05/17/18 1408)  Dock, Sorrel 1:20 H (W/F in mm): 0-0 (05/17/18 1408)  Lamb's Quarter 1:20 H (W/F in mm): 0-0 (05/17/18 1408)  Pigweed, Rough Red Root 1:20 H  (W/F in mm): 0-0 (05/17/18 1408)  Plantain, English 1:20 H  (W/F in mm): 0-0 (05/17/18 1408)  Sagebrush, Mugwort 1:20 H  (W/F in mm): 0-0 (05/17/18 1408)  Animal  Cat 10,000 BAU/ML H (W/F in mm): 0-0 (05/17/18 1408)  Dog 1:10 H (W/F in mm): 0-0 (05/17/18 1408)  Controls  Device Type: QUINTIP (05/17/18 1408)  Neg. control: 50% Glycerine/Saline H (W/F in mm): 0-0 (05/17/18 1408)  Pos. control: Histamine 6mg/ML (W/F in mms): 4-10 (05/17/18 1408)    Impression report and plan:    1.  Asthma  2.  Nasal congestion    Allergy testing was negative.  I would recheck her IgE level in 6 months.  Symptoms do not sound consistent with allergies.   For this reason, I do not think she needs montelukast.  She is reporting little efficacy with Flonase.  For this reason I would have her stop this as well.  If nasal congestion returns, restarts the Flonase.  At this time, I do not think she is a candidate for biologic therapy as her pulmonary function tests are normal and she is not having any symptoms.  She has required prednisone and Zithromax in the past over the winter.  No hospitalizations for asthma or emergency room visits for asthma.  Perhaps, we could consider using her Symbicort at the first sign of illness.  Using 2 puffs every 6 hours as there is recent data that combination medications can be used as rescue.  She also has a nebulizer machine at home, we could consider high-dose budesonide for 2 weeks at the onset of illness.  If her symptoms return, she will let me know.  Hopefully the strategy will keep her off of prednisone.  I did state that prednisone is not a good long-term option.  I am also wondering if she had poor technique with her Symbicort.  Could consider restarting a controller medication prior to fall as well given her technique.    Time spent with patient, 45 minutes, greater than half spent counseling and coordination of care regarding asthma and allergies.

## 2021-06-18 NOTE — LETTER
Letter by Adalid Moreno MD at      Author: Adalid Moreno MD Service: -- Author Type: --    Filed:  Encounter Date: 3/8/2019 Status: (Other)       Roxy Wall MD  [address]  [facility] [address]                                  March 8, 2019    Patient: Wendy Suárez   MR Number: [id number]   YOB: 1940   Date of Visit: 3/8/2019     Dear Dr. Mae MD:    Thank you for referring Wendy Suárez to me for evaluation. Below are the relevant portions of my assessment and plan of care.    If you have questions, please do not hesitate to call me. I look forward to following Wendy along with you.    Sincerely,        Adalid Moreno MD          CC  No Recipients  Adalid Moreno MD  3/8/2019 11:35 AM  Sign at close encounter  Pulmonary Clinic Follow-up Visit    Assessment and Plan:   Wendy Suárez is a 77 y.o. 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 Unfortunately, she was unable to tolerate 2 forms of ICS/LABA. In between flare ups she tends to do very well. She appears to have eosinophilic airway inflammation that is triggered by URI's in the winter. She is content with her current regimen of albuterol nebulizers and occasional prednisone/azithromycin use and doesn't want to add controller medications at this time. FENO is elevated today at 60ppb consistent with recent asthma exacerbation history.     Recommendations:  - Continue albuterol as needed  - will Rx prednisone and azithromycin for asthma action plan; she will call if she feels she needs to start these.  - she declines any ICS controller medications at this point. Since her flare ups seem to be mild and she's not required any hospitalizations or ED visits, I haven't pushed the issue with her.  -She is UTD with flu shot, PSV23 and PCV13. Recommend annual flu shot in the  Fall.  -encouraged her to exercise and remain active.    Follow up in 6 months    CCx: follow up of asthma with eosinophilic airway inflammation    HPI: Interim history: I last saw Wendy on 7/16/2018. Since that time, she's had several asthma flare ups/bronchitis episodes requiring prednisone and antibiotics. She clears up very quickly with these.  Other than that she's been doing very well.  She has excellent exercise tolerance.  She previously did not tolerate Symbicort or Breo.      FENO trend: 115ppb Feb 2018  -> 43ppb April 2018 -> 60ppb March 2019.    ROS:  A 12-system review was obtained and was negative with the exception of the symptoms endorsed in the history of present illness.    PMH:  Past Medical History:   Diagnosis Date   ? Asthma    ? Cough    ? Hypertension        PSH:  Past Surgical History:   Procedure Laterality Date   ? HYSTERECTOMY Bilateral 1993   ? OOPHORECTOMY Bilateral 1993       Allergies:  Allergies   Allergen Reactions   ? Lisinopril      cough   ? Sulfa (Sulfonamide Antibiotics)        Family HX:  Family History   Problem Relation Age of Onset   ? Diabetes Father    ? Hypertension Father    ? Heart disease Paternal Grandfather        Social Hx:  Social History     Socioeconomic History   ? Marital status:      Spouse name: Not on file   ? Number of children: Not on file   ? Years of education: Not on file   ? Highest education level: Not on file   Occupational History   ? Not on file   Social Needs   ? Financial resource strain: Not on file   ? Food insecurity:     Worry: Not on file     Inability: Not on file   ? Transportation needs:     Medical: Not on file     Non-medical: Not on file   Tobacco Use   ? Smoking status: Former Smoker     Types: Cigarettes   ? Smokeless tobacco: Never Used   ? Tobacco comment: quit 55 years ago, social only   Substance and Sexual Activity   ? Alcohol use: No   ? Drug use: Not on file   ? Sexual activity: Not on file   Lifestyle   ? Physical  activity:     Days per week: Not on file     Minutes per session: Not on file   ? Stress: Not on file   Relationships   ? Social connections:     Talks on phone: Not on file     Gets together: Not on file     Attends Restoration service: Not on file     Active member of club or organization: Not on file     Attends meetings of clubs or organizations: Not on file     Relationship status: Not on file   ? Intimate partner violence:     Fear of current or ex partner: Not on file     Emotionally abused: Not on file     Physically abused: Not on file     Forced sexual activity: Not on file   Other Topics Concern   ? Not on file   Social History Narrative   ? Not on file       Current Meds:  Current Outpatient Medications   Medication Sig Dispense Refill   ? albuterol (PROAIR HFA) 90 mcg/actuation inhaler INHALE 2 PUFFS BY INHALATION ROUTE EVERY 6 HOURS AS NEEDED FOR WHEEZING 1 Inhaler 11   ? albuterol (PROVENTIL) 2.5 mg /3 mL (0.083 %) nebulizer solution Take 3 mL (2.5 mg total) by nebulization every 6 (six) hours as needed for wheezing. 380 mL 11   ? amitriptyline (ELAVIL) 10 MG tablet Take 1 tablet (10 mg total) by mouth at bedtime. Take in addition to 25 mg tablet. 90 tablet 1   ? amitriptyline (ELAVIL) 25 MG tablet Take 1 tablet (25 mg) by mouth nightly at bedtime 90 tablet 3   ? ascorbic acid (VITAMIN C) 1000 MG tablet Take 1,000 mg by mouth daily.     ? atorvastatin (LIPITOR) 40 MG tablet TAKE 1 TABLET (40 MG) BY MOUTH ONCE DAILY 90 tablet 1   ? azithromycin (ZITHROMAX) 250 MG tablet Take 2 tablets on day one, and 1 tablet for the next four days. 6 tablet 0   ? blood glucose meter (GLUCOMETER) Use 1 each As Directed as needed. Dispense glucometer brand per patient's insurance at pharmacy discretion. Dx - Diabetes 1 each 0   ? blood glucose test (CONTOUR NEXT TEST STRIPS) strips USE 1 EACH AS DIRECTED 2 TIMES A DAY.. 200 strip 3   ? blood glucose test strips Use 1 each As Directed 2 (two) times a day Dispense brand per  patient's insurance at pharmacy discretion.. 100 strip 5   ? blood glucose test strips Use 1 each As Directed as needed. Dispense brand per patient's insurance at pharmacy discretion. 100 strip 5   ? blood glucose test strips Check sugar twice daily.  Dispense Accu check which works with new meter - per patient's insurance at pharmacy discretion. DX DM E11.9 100 strip 5   ? blood pressure monitor Kit Check BP couple times a week.  Goal <140/90   DX - HTN and Diabetes 1 each 0   ? blood-glucose meter (ACCU-CHEK LI PLUS METER) Misc Check sugars 2 times daily. DX DM E11.9 1 each 0   ? cholecalciferol, vitamin D3, 1,000 unit tablet Take 1,000 Units by mouth daily.     ? fluticasone (FLONASE ALLERGY RELIEF) 50 mcg/actuation nasal spray 2 sprays each nostril daily. 16 g 3   ? FOLIC ACID/MULTIVITS-MIN/LUT (CENTRUM SILVER ORAL) Take by mouth.     ? gabapentin (NEURONTIN) 300 MG capsule take 3 capsules by mouth three times daily 810 capsule 2   ? generic lancets (MICROLET LANCET) Dispense brand per patient's insurance at pharmacy discretion. Check blood sugar twice daily. 100 each 5   ? hydroCHLOROthiazide (HYDRODIURIL) 25 MG tablet Take 1 tablet (25 mg total) by mouth daily. 90 tablet 0   ? losartan (COZAAR) 100 MG tablet TAKE ONE TABLET BY MOUTH ONCE DAILY  90 tablet 2   ? meloxicam (MOBIC) 15 MG tablet Take 1 tablet (15 mg total) by mouth daily. 90 tablet 0   ? metFORMIN (GLUCOPHAGE) 500 MG tablet TAKE 1 TABLET BY MOUTH 2 TIMES PER  tablet 2   ? omega-3 fatty acids 1,000 mg cap Take by mouth.     ? VOLTAREN 1 % Gel APPLY TO UPPER EXTREMITIES, 2 GM OF GEL TO AFFECTED AREA 4 TIMES DAILY.  DO NOT APPLY MORE THAN 8 GM DAILY TO ANY ONE AFFECTED JOINT. 100 g 3     No current facility-administered medications for this visit.        Physical Exam:  [facility] 09/08/1989   Gen: awake, alert, oriented, no distress  HEENT: nasal turbinates are unremarkable, no oropharyngeal lesions, no cervical or supraclavicular  lymphadenopathy  CV: RRR, no M/G/R  Resp: CTAB, no focal crackles or wheezes  Abd: soft, nontender, no palpable organomegaly  Skin: no apparent rashes  Ext: no cyanosis, clubbing or edema  Neuro: alert, nonfocal    Labs:  IgE 260 Oct 2017  eos 7% in Oct 2017  [place] allergy panel was negative  ILD w/u neg 2014    Imaging studies:  reviewed    PFT's  Oct 2017  FEV1/FVC is 77 and is normal.  FEV1 is 84% predicted and is normal.  FVC is 83% predicted and normal.  There was no improvement in spirometry after a single inhaled dose of bronchodilator.  TLC is 99% predicted and is normal.  RV is 123% predicted and is normal.  DLCO is 84% predicted and is normal when it   is corrected for hemoglobin.     Impression:  Full Pulmonary Function Test is normal.    Adalid (Debora Moreno MD  [facility]  Pager [phone]  Clinic [phone]

## 2021-06-18 NOTE — PATIENT INSTRUCTIONS - HE
Patient Instructions by Tsering Henson MD at 8/6/2020  9:00 AM     Author: Tsering Henson MD Service: -- Author Type: Physician    Filed: 8/6/2020  9:31 AM Encounter Date: 8/6/2020 Status: Addendum    : Tsering Henson MD (Physician)    Related Notes: Original Note by Tsering Henson MD (Physician) filed at 8/6/2020  9:11 AM       Please get shingrix (new shingles vaccine) at your pharmacy. It's a 2 dose series  by 6 months.     Patient Education   Urinary Incontinence, Female (Adult)  Urinary incontinence means loss of control of the bladder. This problem affects many women, especially as they get older. If you have incontinence, you may be embarrassed to ask for help. But know that this problem can be treated.  Types of Incontinence  There are different types of incontinence. Two of the main types are described here. You can have more than one type.    Stress incontinence. With this type, urine leaks when pressure (stress) is put on the bladder. This may happen when you cough, sneeze, or laugh. Stress incontinence most often occurs because the pelvic floor muscles that support the bladder and urethra are weak. This can happen after pregnancy and vaginal childbirth or a hysterectomy. It can also be due to excess body weight or hormone changes.    Urge incontinence (also called overactive bladder). With this type, a sudden urge to urinate is felt often. This may happen even though there may not be much urine in the bladder. The need to urinate often during the night is common. Urge incontinence most often occurs because of bladder spasms. This may be due to bladder irritation or infection. Damage to bladder nerves or pelvic muscles, constipation, and certain medicines can also lead to urge incontinence.  Treatment of urinary incontinence depends on the cause. Further evaluation is needed to find the type you have. This will likely include an exam and certain tests. Based on the results, you  and your healthcare provider can then plan treatment. Until a diagnosis is made, the home care tips below can help relieve symptoms.  Home care    Do pelvic floor muscle exercises, if they are prescribed. The pelvic floor muscles help support the bladder and urethra. Many women find that their symptoms improve when doing special exercises that strengthen these muscles. To do the exercises contract the muscles you would use to stop your stream of urine, but do this when youre not urinating. Hold for 10 seconds, then relax. Repeat 10 to 20 times in a row, at least 3 times a day. Your provider may give you other instructions for how to do the exercises and how often.    Keep a bladder diary. This helps track how often and how much you urinate over a set period of time. Bring this diary with you to your next visit with the provider. The information can help your provider learn more about your bladder problem.    Lose weight, if advised to by your provider. Excess weight puts pressure on the bladder. Your provider can help you create a weight-loss plan thats right for you. This may include exercising more and making certain diet changes.    Don't consume foods and drinks that may irritate the bladder. These can include alcohol and caffeinated drinks.    Quit smoking. Smoking and other tobacco use can lead to chronic cough that strains the pelvic floor muscles. Smoking may also damage the bladder and urethra. Talk with your provider about treatments or methods you can use to quit smoking.    If drinking large amounts of fluid causes you to have symptoms, you may be advised to limit your fluid intake. You may also be advised to drink most of your fluids during the day and to limit fluids at night.    If youre worried about urine leakage or accidents, you may wear absorbent pads to catch urine. Change the pads often. This helps reduce discomfort. It may also reduce the risk of skin or bladder infections.  Follow-up  care  Follow up with your healthcare provider, or as directed. It may take some to find the right treatment for your problem. Your treatment plan may include special therapies or medicines. Certain procedures or surgery may also be options. Be sure to discuss any questions you have with your provider.  When to seek medical advice  Call the healthcare provider right away if any of these occur:    Fever of 100.4 F (38 C) or higher, or as directed by your provider    Bladder pain or fullness    Abdominal swelling    Nausea or vomiting    Back pain    Weakness, dizziness or fainting  Date Last Reviewed: 10/1/2017    7246-7482 [employer]. [address] [place] [address]. All rights reserved. This information is not intended as a substitute for professional medical care. Always follow your healthcare professional's instructions.     Patient Education   Treating Incontinence in Women: Nonsurgical Methods    The best treatment for you will depend on the type of incontinence you have. Your symptoms, age, and any underlying problems that are found also affect your treatment. While some types of incontinence may eventually require surgery, nonsurgical treatments may be effective in many cases. Nonsurgical treatments include lifestyle changes, muscle-strengthening exercises, and medicines.  Nonsurgical Treatments  Treatment for stress urinary incontinence includes:    Bladder training    Lifestyle changes such as weight loss and increased activity if incontinence is due to being overweight    Medicines, if bladder training has not helped    Pelvic floor muscle exercises  Lifestyle changes    Losing weight. Excess weight puts extra pressure on the pelvic floor muscles. Exercising and eating right can help you lose weight. This helps other treatments work better.    Making certain diet changes. Some foods may make you need to urinate more, so it may be good to avoid them. These include caffeinated drinks  and alcohol. Ask your healthcare provider whether these or other diet changes might be helpful.    Quitting smoking. Smoking can lead to a chronic cough that strains pelvic floor muscles. Smoking may also damage the bladder and urethra.  Pelvic floor muscle exercises  There are exercises you can do to help strengthen your pelvic floor muscles. The pelvic floor muscles act as a sling to help hold the bladder and urethra in place. These muscles also help keep the urethra closed. Weak pelvic floor muscles may allow urine to leak. To strengthen the pelvic floor muscles, do the exercises daily. In a few months, the muscles will be stronger and tighter. This can help prevent urine leakage.  Date Last Reviewed: 1/1/2017 2000-2017 [employer]. [address] [place] [address], [place] [address]. All rights reserved. This information is not intended as a substitute for professional medical care. Always follow your healthcare professional's instructions.     Patient Education   Treating Incontinence in Women: Special Therapies     During biofeedback, sensors send signals from your pelvic floor muscles to a computer screen.     Your doctor will discuss your options for treating your urinary incontinence. These depend on the cause of your problem and any other health issues you have. Usually behavioral changes are tried first, followed by various medicines. If these methods are unsuccessful, one or more of the therapies described below may be part of your treatment plan.  Biofeedback  This technique is taught by a nurse or physical therapist. During the therapy, a small sensor is placed in your vagina or rectum. Another sensor is placed on your stomach. Other types of sensors are also available. These sensors read signals from the pelvic floor muscles. When you contract or relax your muscles, these signals are shown as images on a computer screen. Using the images, you can learn to relax or contract certain muscles. This  can help you strengthen and better control these muscles. And it can help you learn pelvic floor muscle exercises.  Electrical stimulation  This is a painless therapy that uses a tiny amount of electric current. It helps strengthen very weak or damaged pelvic floor muscles. The electric current is sent through the muscles of the pelvic floor and bladder. This causes the muscles to contract. In time, this helps make the muscles stronger.  Stimulator implants  This technique is used to treat urge incontinence. A small device is implanted under the skin near the stomach. This device gives off mild electrical signals. These block extra signals that are being sent to the bladder muscle. This helps the bladder work more normally.  Date Last Reviewed: 1/1/2017 2000-2017 [employer]. [address] [place] [address], [place] [address]. All rights reserved. This information is not intended as a substitute for professional medical care. Always follow your healthcare professional's instructions.     Patient Education   Treating Incontinence in Women with Medicine    Urinary incontinence is the leaking of urine from the bladder. In some cases, medicine can reduce or stop the leaking. It is mainly given for urge incontinence. Your healthcare provider will talk with you about your options. Make sure to ask what side effects to expect.  Below are some types of medicines that may help with urge incontinence.  Types of medicine    Anticholinergics. These may increase how much urine the bladder can hold. They may also help relax bladder muscles.    Estrogen. This may help improve muscle tone in the urethra and bladder.    Antibiotics. These are used to treat urinary tract infections.    Botulinum toxin. Injection of botulinum toxin into the bladder muscle is an option when other medicines are not effective.  Tips for taking medicine    Take your medicine on time and as your healthcare provider tell you to.    Tell your  healthcare provider if you have any side effects, your dosage may be adjusted if necessary.    Be patient. It may take time to find the right dose for you.    Keep a list of the medicines you take. Show it to your healthcare provider and pharmacist before you buy over-the-counter medicines.   Date Last Reviewed: 1/1/2017 2000-2017 [employer]. [address] [place] [address]. All rights reserved. This information is not intended as a substitute for professional medical care. Always follow your healthcare professional's instructions.     Patient Education     Kegel Exercises  Kegel exercises dont need special clothing or equipment. Theyre easy to learn and simple to do. And if you do them right, no one can tell youre doing them, so they can be done almost anywhere. Your healthcare provider, nurse, or physical therapist can answer any questions you have and help you get started.    A weak pelvic floor  The pelvic floor muscles may weaken due to aging, pregnancy and vaginal childbirth, injury, surgery, chronic cough, or lack of exercise. If the pelvic floor is weak, your bladder and other pelvic organs may sag out of place. The urethra may also open too easily and allow urine to leak out. Kegel exercises can help you strengthen your pelvic floor muscles. Then they can better support the pelvic organs and control urine flow.  How Kegel exercises are done  Try each of the Kegel exercises described below. When youre doing them, try not to move your leg, buttock, or stomach muscles:    Contract as if you were stopping your urine stream. But do it when youre not urinating.    Tighten your rectum as if trying not to pass gas. Contract your anus, but dont move your buttocks.    You may place a finger or 2 in the vagina and squeeze your finger with your vagina to learn which muscles to tighten.  Try to hold each Kegel for a slow count to 5. You probably wont be able to hold them for that long at  first. But keep practicing. It will get easier as your pelvic floor gets stronger. Eventually, special weights that you place in your vagina may be recommended to help make your Kegels even more effective. Visit your healthcare provider if you have difficulties doing Kegel exercises.  Helpful hints  Here are some tips to follow:    Do your Kegels as often as you can. The more you do them, the faster youll feel the results.    Pick an activity you do often as a reminder. For instance, do your Kegels every time you sit down.    Tighten your pelvic floor before you sneeze, get up from a chair, cough, laugh, or lift. This protects your pelvic floor from injury and can help prevent urine leakage.   Date Last Reviewed: 10/1/2017    9192-9940 [employer]. [address] [place] [address], [place] [address]. All rights reserved. This information is not intended as a substitute for professional medical care. Always follow your healthcare professional's instructions.       Advance Directive  Patients advance directive was discussed and I am comfortable with the patients wishes.  Patient Education   Personalized Prevention Plan  You are due for the preventive services outlined below.  Your care team is available to assist you in scheduling these services.  If you have already completed any of these items, please share that information with your care team to update in your medical record.  Health Maintenance   Topic Date Due   ? HEPATITIS B VACCINES (1 of 3 - Risk 3-dose series) 07/25/1959   ? MEDICARE ANNUAL WELLNESS VISIT  07/25/2004   ? ZOSTER VACCINES (2 of 3) 08/10/2010   ? DXA SCAN  10/06/2019   ? DIABETIC FOOT EXAM  10/22/2019   ? A1C  04/14/2020   ? FALL RISK ASSESSMENT  04/17/2020   ? DIABETIC EYE EXAM  05/08/2020   ? INFLUENZA VACCINE RULE BASED (1) 08/01/2020   ? ASTHMA ACTION PLAN  10/14/2020   ? BMP  10/14/2020   ? LIPID  10/14/2020   ? MICROALBUMIN  10/14/2020   ? Asthma Control Test  10/18/2020   ? TD 18+ HE   04/25/2023   ? ADVANCE CARE PLANNING  04/25/2023   ? PNEUMOCOCCAL IMMUNIZATION 65+ HIGH/HIGHEST RISK  Completed

## 2021-06-19 NOTE — LETTER
Letter by Roxy Wall MD at      Author: Roxy Wall MD Service: -- Author Type: --    Filed:  Encounter Date: 4/17/2019 Status: (Other)       Wendy Suárez  [address]  [facility] [address]                 April 17, 2019      Dear Wendy:     At your previous appointment with us your blood pressure was elevated and I would like for you to schedule a nurse only appointment to recheck your blood pressure.    We have included a personalized hypertension report card on the following page that lists your most recent blood pressure readings along with your ideal values. Please message us through [employer] or call us at [phone] to schedule your nurse only appointment.    Thank you for including us as members of your health care team.      Sincerely,         Roxy Wall MD    Enclosure    Hypertension Report Card for Wendy Suárez  April 17, 2019:     Below is a summary of recent tests related to your hypertension.     Blood Pressure:   Normal blood pressure values are 120/80 or lower. Your blood pressure values should be less than 120/80.      Your most recent blood pressure readings at our clinic were:   BP Readings from Last 3 Encounters:   04/17/19 147/60   03/08/19 132/68   01/24/19 135/90

## 2021-06-19 NOTE — LETTER
"Letter by Tsering Henson MD at      Author: Tsering Henson MD Service: -- Author Type: --    Filed:  Encounter Date: 10/18/2019 Status: Signed         Wendy Suárez  [address]  [facility] [address]             October 18, 2019         Dear Ms. Suárez,    Below are the results from your recent visit:    Resulted Orders   Glycosylated Hemoglobin A1c   Result Value Ref Range    Hemoglobin A1c 6.4 (H) 3.5 - 6.0 %   Comprehensive Metabolic Panel   Result Value Ref Range    Sodium 136 136 - 145 mmol/L    Potassium 4.0 3.5 - 5.0 mmol/L    Chloride 99 98 - 107 mmol/L    CO2 30 22 - 31 mmol/L    Anion Gap, Calculation 7 5 - 18 mmol/L    Glucose 118 70 - 125 mg/dL    BUN 14 8 - 28 mg/dL    Creatinine 0.84 0.60 - 1.10 mg/dL    GFR MDRD Af Amer >60 >60 mL/min/1.73m2    GFR MDRD Non Af Amer >60 >60 mL/min/1.73m2    Bilirubin, Total 1.0 0.0 - 1.0 mg/dL    Calcium 9.6 8.5 - 10.5 mg/dL    Protein, Total 6.3 6.0 - 8.0 g/dL    Albumin 3.9 3.5 - 5.0 g/dL    Alkaline Phosphatase 70 45 - 120 U/L    AST 21 0 - 40 U/L    ALT 30 0 - 45 U/L    Narrative    Fasting Glucose reference range is 70-99 mg/dL per  American Diabetes Association (ADA) guidelines.   Microalbumin, Random Urine   Result Value Ref Range    Microalbumin, Random Urine <0.50 0.00 - 1.99 mg/dL    Creatinine, Urine 25.2 mg/dL    Microalbumin/Creatinine Ratio Random Urine        Comment:      \"Unable to calculate: Creatinine and/or Microalbumin value below detectable level\"    Narrative    Microalbumin, Random Urine  <2.0 mg/dL . . . . . . . . Normal  3.0-30.0 mg/dL . . . . . . Microalbuminuria  >30.0 mg/dL . . . . . .  . Clinical Proteinuria    Microalbumin/Creatinine Ratio, Random Urine  <20 mg/g . . . . .. . . . Normal   mg/g . . . . . . . Microalbuminuria  >300 mg/g . . . . . . . . Clinical Proteinuria       Lipid Cascade FASTING   Result Value Ref Range    Cholesterol 155 <=199 mg/dL    Triglycerides 224 (H) <=149 mg/dL    HDL Cholesterol 63 " >=50 mg/dL    LDL Calculated 47 <=129 mg/dL    Patient Fasting > 8hrs? Yes        Kidney liver function look good   Cholesterol look fine   I think we should decrease metformin from 1 tablet two times a day to just 1 tablet daily.  See me in 6 months for follow up clinic visit     Please call with questions or contact us using [employer].    Sincerely,        Electronically signed by Tsering Henson MD

## 2021-06-19 NOTE — LETTER
Letter by Roxy Wall MD at      Author: Roxy Wall MD Service: -- Author Type: --    Filed:  Encounter Date: 4/18/2019 Status: (Other)         eWndy Suárez  [address]  [facility] [address]             April 18, 2019         Dear Ms. Suárez,    Below are the results from your recent visit:    Resulted Orders   Glycosylated Hemoglobin A1c   Result Value Ref Range    Hemoglobin A1c 6.8 (H) 3.5 - 6.0 %   Comprehensive Metabolic Panel   Result Value Ref Range    Sodium 135 (L) 136 - 145 mmol/L    Potassium 3.7 3.5 - 5.0 mmol/L    Chloride 97 (L) 98 - 107 mmol/L    CO2 26 22 - 31 mmol/L    Anion Gap, Calculation 12 5 - 18 mmol/L    Glucose 115 70 - 125 mg/dL    BUN 22 8 - 28 mg/dL    Creatinine 0.91 0.60 - 1.10 mg/dL    GFR MDRD Af Amer >60 >60 mL/min/1.73m2    GFR MDRD Non Af Amer 60 (L) >60 mL/min/1.73m2    Bilirubin, Total 0.9 0.0 - 1.0 mg/dL    Calcium 9.3 8.5 - 10.5 mg/dL    Protein, Total 6.2 6.0 - 8.0 g/dL    Albumin 3.8 3.5 - 5.0 g/dL    Alkaline Phosphatase 67 45 - 120 U/L    AST 19 0 - 40 U/L    ALT 29 0 - 45 U/L    Narrative    Fasting Glucose reference range is 70-99 mg/dL per  American Diabetes Association (ADA) guidelines.   Lipid Cascade   Result Value Ref Range    Cholesterol 151 <=199 mg/dL    Triglycerides 281 (H) <=149 mg/dL    HDL Cholesterol 56 >=50 mg/dL    LDL Calculated 39 <=129 mg/dL    Patient Fasting > 8hrs? Yes        Overall your labs look good.  Cholesterol is at goal.  Your sodium is slightly low, but I suspect that was due to fasting.      Please call with questions or contact us using [employer].    Sincerely,        Electronically signed by Roxy Wall MD

## 2021-06-19 NOTE — LETTER
Letter by Tsering Henson MD at      Author: Tsering Henson MD Service: -- Author Type: --    Filed:  Encounter Date: 10/14/2019 Status: Signed       My Asthma Action Plan     Name: Wendy Suárez   YOB: 1940  Date: 10/14/2019   My doctor: Tsering Henson MD   My clinic: [facility] MEDICINE/OB        My Rescue Medicine:   Albuterol (Proair/Ventolin/Proventil HFA) 2-4 puffs EVERY 4 HOURS as needed. Use a spacer if recommended by your provider.   My Asthma Severity:   Intermittent/Exercise Induced  Know your asthma triggers: upper respiratory infections             GREEN ZONE   Good Control    I feel good    No cough or wheeze    Can work, sleep and play without asthma symptoms     Take your asthma control medicine every day.     1. If exercise triggers your asthma, take your rescue medication    15 minutes before exercise or sports, and    During exercise if you have asthma symptoms  2. Spacer to use with inhaler: If you have a spacer, make sure to use it with your inhaler             YELLOW ZONE Getting Worse  I have ANY of these:    I do not feel good    Cough or wheeze    Chest feels tight    Wake up at night 1. Keep taking your Green Zone medications  2. Start taking your rescue medicine:    every 20 minutes for up to 1 hour. Then every 4 hours for 24-48 hours.  3. If you stay in the Yellow Zone for more than 12-24 hours, contact your doctor.  4. If you do not return to the Green Zone in 12-24 hours or you get worse, start taking your oral steroid medicine if prescribed by your provider.           RED ZONE Medical Alert - Get Help  I have ANY of these:    I feel awful    Medicine is not helping    Breathing getting harder    Trouble walking or talking    Nose opens wide to breathe     1. Take your rescue medicine NOW  2. If your provider has prescribed an oral steroid medicine, start taking it NOW  3. Call your doctor NOW  4. If you are still in the Red Zone after 20 minutes and  you have not reached your doctor:    Take your rescue medicine again and    Call 911 or go to the emergency room right away    See your regular doctor within 2 weeks of an Emergency Room or Urgent Care visit for follow-up treatment.          Annual Reminders:  Meet with Asthma Educator,  Flu Shot in the Fall, consider Pneumonia Vaccination for patients with asthma (aged 19 and older).    Pharmacy:   [id number] - [place], [place] - [address] [place] [address]  Phone: [phone] Fax: [phone]                            Asthma Triggers  How To Control Things That Make Your Asthma Worse    Triggers are things that make your asthma worse.  Look at the list below to help you find your triggers and what you can do about them.  You can help prevent asthma flare-ups by staying away from your triggers.      Trigger                                                          What you can do   Cigarette Smoke  Tobacco smoke can make asthma worse. Do not allow smoking in your home, car or around you.  Be sure no one smokes at a nina day care or school.  If you smoke, ask your health care provider for ways to help you quit.  Ask family members to quit too.  Ask your health care provider for a referral to Quit Plan to help you quit smoking, or call 0-431-495-PLAN.     Colds, Flu, Bronchitis  These are common triggers of asthma. Wash your hands often.  Dont touch your eyes, nose or mouth.  Get a flu shot every year.     Dust Mites  These are tiny bugs that live in cloth or carpet. They are too small to see. Wash sheets and blankets in hot water every week.   Encase pillows and mattress in dust mite proof covers.  Avoid having carpet if you can. If you have carpet, vacuum weekly.   Use a dust mask and HEPA vacuum.   Pollen and Outdoor Mold  Some people are allergic to trees, grass, or weed pollen, or molds. Try to keep your windows closed.  Limit time out doors when pollen  count is high.   Ask you health care provider about taking medicine during allergy season.     Animal Dander  Some people are allergic to skin flakes, urine or saliva from pets with fur or feathers. Keep pets with fur or feathers out of your home.    If you cant keep the pet outdoors, then keep the pet out of your bedroom.  Keep the bedroom door closed.  Keep pets off cloth furniture and away from stuffed toys.     Mice, Rats, and Cockroaches  Some people are allergic to the waste from these pests.   Cover food and garbage.  Clean up spills and food crumbs.  Store grease in the refrigerator.   Keep food out of the bedroom.   Indoor Mold  This can be a trigger if your home has high moisture. Fix leaking faucets, pipes, or other sources of water.   Clean moldy surfaces.  Dehumidify basement if it is damp and smelly.   Smoke, Strong Odors, and Sprays  These can reduce air quality. Stay away from strong odors and sprays, such as perfume, powder, hair spray, paints, smoke incense, paint, cleaning products, candles and new carpet.   Exercise or Sports  Some people with asthma have this trigger. Be active!  Ask your doctor about taking medicine before sports or exercise to prevent symptoms.    Warm up for 5-10 minutes before and after sports or exercise.     Other Triggers of Asthma  Cold air:  Cover your nose and mouth with a scarf.  Sometimes laughing or crying can be a trigger.  Some medicines and food can trigger asthma.

## 2021-06-19 NOTE — PROGRESS NOTES
Pulmonary Clinic Follow-up Visit    Assessment and Plan:   Wendy Suárez is a 77 y.o. 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 This occurred in the setting of several URI's this past winter. Unfortunately, she was unable to tolerate 2 forms of ICS/LABA. Since that time she has done remarkably well despite being off any controller medications. I suspect she had several consecutive viral URI's that triggered a robust immune respone in her lungs with eosinophilic airway inflammation. She may be prone to these in the winter time.     Recommendations:  -Continue albuterol as needed  - Appreciate Dr. Hurtado's evaluation  - she is off flonase and singulair  - She will call if she has any breathing issues during the winter. The options would be a short course of antibiotic and prednisone as before, or a trial of nebulized budesonide for a couple of weeks to try and limit her reliance on oral steroids during this time.  -She is UTD with flu shot, PSV23 and PCV13. Recommend annual flu shot in the Fall.  -encouraged her to exercise and remain active.    Follow up in 6 months    CCx: follow up of asthma with eosinophilic airway inflammation    HPI: Interim history: I last saw Wendy on 4/26/2018. Since that time, she was seen by Dr. Hurtado of allergy/immunology and did not need biologic therapy at that time. She was also taken off the FLonase and singulair. In terms of her symptoms, she reports doing very well. No asthma flare ups, antibiotics or prednisone courses since I last saw her. She has not used her rescue inhaler in some time. Staying active and exercising. No breathing limitations.    FENO trend: 115ppb Feb 2018  -> 43ppb April 2018. Did not recheck today as she has no symptoms.  ACT 25 today.    ROS:  A 12-system review was obtained and was negative with the exception of the symptoms endorsed in the  history of present illness.    PMH:  Past Medical History:   Diagnosis Date     Asthma      Cough      Hypertension        PSH:  Past Surgical History:   Procedure Laterality Date     HYSTERECTOMY Bilateral 1993     OOPHORECTOMY Bilateral 1993       Allergies:  Allergies   Allergen Reactions     Lisinopril      cough     Sulfa (Sulfonamide Antibiotics)        Family HX:  Family History   Problem Relation Age of Onset     Diabetes Father      Hypertension Father      Heart disease Paternal Grandfather        Social Hx:  Social History     Social History     Marital status:      Spouse name: N/A     Number of children: N/A     Years of education: N/A     Occupational History     Not on file.     Social History Main Topics     Smoking status: Former Smoker     Types: Cigarettes     Smokeless tobacco: Never Used      Comment: quit 55 years ago, social only     Alcohol use No     Drug use: Not on file     Sexual activity: Not on file     Other Topics Concern     Not on file     Social History Narrative       Current Meds:  Current Outpatient Prescriptions   Medication Sig Dispense Refill     albuterol (PROAIR HFA) 90 mcg/actuation inhaler INHALE 2 PUFFS BY INHALATION ROUTE EVERY 6 HOURS AS NEEDED FOR WHEEZING 1 Inhaler 11     albuterol (PROVENTIL) 2.5 mg /3 mL (0.083 %) nebulizer solution Take 3 mL (2.5 mg total) by nebulization every 6 (six) hours as needed for wheezing. 380 mL 11     amitriptyline (ELAVIL) 10 MG tablet Take 2 tablets (20mg) by mouth at bedtime. 180 tablet 2     ascorbic acid (VITAMIN C) 1000 MG tablet Take 1,000 mg by mouth daily.       atorvastatin (LIPITOR) 40 MG tablet TAKE 1 TABLET (40 MG) BY MOUTH ONCE DAILY 90 tablet 1     blood glucose test (CONTOUR NEXT STRIPS) strips USE 1 EACH AS DIRECTED 2 TIMES A DAY. 100 strip 2     blood pressure monitor Kit Check BP couple times a week.  Goal <140/90   DX - HTN and Diabetes 1 each 0     cholecalciferol, vitamin D3, 1,000 unit tablet Take 1,000  Units by mouth daily.       FOLIC ACID/MULTIVITS-MIN/LUT (CENTRUM SILVER ORAL) Take by mouth.       gabapentin (NEURONTIN) 300 MG capsule take 3 capsules by mouth three times daily 810 capsule 1     generic lancets (FINGERSTIX LANCETS) 1 each by In Vitro route 2 (two) times a day. 100 each 3     hydroCHLOROthiazide (HYDRODIURIL) 25 MG tablet TAKE ONE TABLET BY MOUTH ONE TIME DAILY  90 tablet 3     losartan (COZAAR) 100 MG tablet TAKE ONE TABLET BY MOUTH ONCE DAILY  90 tablet 2     meloxicam (MOBIC) 15 MG tablet Take 1 tablet (15 mg total) by mouth daily. 90 tablet 0     metFORMIN (GLUCOPHAGE) 500 MG tablet TAKE 1 TABLET BY MOUTH 2 TIMES PER DAY  180 tablet 3     omega-3 fatty acids 1,000 mg cap Take by mouth.       traZODone (DESYREL) 50 MG tablet TAKE 1/2 TABLET (25 MG) BY MOUTH AT BEDTIME. MAY INCREASE TO 1 TABLET AT NIGHT IF NEEDED. 30 tablet 2     VOLTAREN 1 % Gel APPLY TO UPPER EXTREMITIES, 2 GM OF GEL TO AFFECTED AREA 4 TIMES DAILY.  DO NOT APPLY MORE THAN 8 GM DAILY TO ANY ONE AFFECTED JOINT. 100 g 3     fluticasone-vilanterol (BREO ELLIPTA) 200-25 mcg/dose DsDv inhaler Inhale 1 puff daily. 1 each 6     No current facility-administered medications for this visit.        Physical Exam:  /62  Pulse 84  Resp 24  Wt 154 lb 3.2 oz (69.9 kg)  LMP 09/08/1989  SpO2 96% Comment: RA  BMI 26.47 kg/m2  Gen: awake, alert, oriented, no distress  HEENT: nasal turbinates are unremarkable, no oropharyngeal lesions, no cervical or supraclavicular lymphadenopathy  CV: RRR, no M/G/R  Resp: CTAB, no focal crackles or wheezes  Abd: soft, nontender, no palpable organomegaly  Skin: no apparent rashes  Ext: no cyanosis, clubbing or edema  Neuro: alert, nonfocal    Labs:  IgE 211  FENO 115 last visit  eos 7% in Oct 2017  [place] allergy panel was negative    Imaging studies:  reviewed    PFT's  Oct 2017  FEV1/FVC is 77 and is normal.  FEV1 is 84% predicted and is normal.  FVC is 83% predicted and normal.  There was no  improvement in spirometry after a single inhaled dose of bronchodilator.  TLC is 99% predicted and is normal.  RV is 123% predicted and is normal.  DLCO is 84% predicted and is normal when it   is corrected for hemoglobin.     Impression:  Full Pulmonary Function Test is normal.    Adalid Moreno MD (Avi)  [facility] Pulmonary & Critical Care  Pager [phone]  Clinic [phone]

## 2021-06-19 NOTE — LETTER
Letter by Adalid Moreno MD at      Author: Adalid Moreno MD Service: -- Author Type: --    Filed:  Encounter Date: 9/13/2019 Status: (Other)         Rxoy Wall MD  [address] [facility] [address]                                  September 13, 2019    Patient: Wendy Suárez   MR Number: [id number]   YOB: 1940   Date of Visit: 9/13/2019     Dear Dr. Mae MD:    Thank you for referring Wendy Suárez to me for evaluation. Below are the relevant portions of my assessment and plan of care.    If you have questions, please do not hesitate to call me. I look forward to following Wendy along with you.    Sincerely,        Adalid Moreno MD          CC  No Recipients  Adalid Moreno MD  9/13/2019  2:08 PM  Sign at close encounter  Pulmonary Clinic Follow-up Visit    Assessment and Plan:   Wendy Suárez is a 77 y.o. 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 Unfortunately, she was unable to tolerate 2 forms of ICS/LABA. She is doing very well, without any symptoms and is being maintained on PAULINO only.      Recommendations:  - Continue albuterol as needed  - she'll let us know if she has to use emergency prednisone and azithromycin for asthma action plan, has not used since last visit.  -She is UTD with PSV23 and PCV13. Needs flu shot - she'll get this from the drug store for insurance/cost purposes.  -encouraged her to exercise and remain active.    Follow up in 6 months (she tends to flare up in the winter so I'd like to see her after the winter to check in with her).    CCx: follow up of asthma with eosinophilic airway inflammation    HPI: Interim history: I last saw Wendy on 3/8/2019. She was doing well at that time. She was on PAULINO only. Previously she did not tolerate Symbicort or Breo.    Today she reports she's doing great. No cough,  wheezing or shortness of breath. Has not had to use prednisone or azithromycin (action plan) since our last visit. She feels good knowing she has these medications available should she need them.  She remains active and takes care of her .     FENO trend: 115ppb Feb 2018  -> 43ppb April 2018 -> 60ppb March 2019. -> not checked today as she is doing very well without symptoms.     ROS:  A 12-system review was obtained and was negative with the exception of the symptoms endorsed in the history of present illness.    PMH:  Past Medical History:   Diagnosis Date   ? Asthma     high IgE, elevated FENO 115 highest   ? Cough    ? Hypertension        PSH:  Past Surgical History:   Procedure Laterality Date   ? HYSTERECTOMY Bilateral 1993   ? OOPHORECTOMY Bilateral 1993       Allergies:  Allergies   Allergen Reactions   ? Lisinopril      cough   ? Sulfa (Sulfonamide Antibiotics)        Family HX:  Family History   Problem Relation Age of Onset   ? Diabetes Father    ? Hypertension Father    ? Heart disease Paternal Grandfather        Social Hx:  Social History     Socioeconomic History   ? Marital status:      Spouse name: Not on file   ? Number of children: Not on file   ? Years of education: Not on file   ? Highest education level: Not on file   Occupational History   ? Not on file   Social Needs   ? Financial resource strain: Not on file   ? Food insecurity:     Worry: Not on file     Inability: Not on file   ? Transportation needs:     Medical: Not on file     Non-medical: Not on file   Tobacco Use   ? Smoking status: Former Smoker     Types: Cigarettes   ? Smokeless tobacco: Never Used   ? Tobacco comment: quit 55 years ago, social only   Substance and Sexual Activity   ? Alcohol use: No   ? Drug use: Not on file   ? Sexual activity: Not on file   Lifestyle   ? Physical activity:     Days per week: Not on file     Minutes per session: Not on file   ? Stress: Not on file   Relationships   ? Social  connections:     Talks on phone: Not on file     Gets together: Not on file     Attends [employer] service: Not on file     Active member of club or organization: Not on file     Attends meetings of clubs or organizations: Not on file     Relationship status: Not on file   ? Intimate partner violence:     Fear of current or ex partner: Not on file     Emotionally abused: Not on file     Physically abused: Not on file     Forced sexual activity: Not on file   Other Topics Concern   ? Not on file   Social History Narrative   ? Not on file       Current Meds:  Current Outpatient Medications   Medication Sig Dispense Refill   ? albuterol (PROAIR HFA) 90 mcg/actuation inhaler INHALE 2 PUFFS BY INHALATION ROUTE EVERY 6 HOURS AS NEEDED FOR WHEEZING 1 Inhaler 11   ? albuterol (PROVENTIL) 2.5 mg /3 mL (0.083 %) nebulizer solution Take 3 mL (2.5 mg total) by nebulization every 6 (six) hours as needed for wheezing. 380 mL 11   ? amitriptyline (ELAVIL) 10 MG tablet Take 1 tablet (10 mg) by mouth ONCE A DAYat bedtime. Take in addition to 25 mg tablet. 90 tablet 0   ? amitriptyline (ELAVIL) 25 MG tablet Take 1 tablet (25 mg) by mouth nightly at bedtime 90 tablet 3   ? ascorbic acid (VITAMIN C) 1000 MG tablet Take 1,000 mg by mouth daily.     ? atorvastatin (LIPITOR) 40 MG tablet TAKE 1 TABLET (40 MG) BY MOUTH ONCE DAILY 90 tablet 3   ? blood glucose meter (GLUCOMETER) Use 1 each As Directed as needed. Dispense glucometer brand per patient's insurance at pharmacy discretion. Dx - Diabetes 1 each 0   ? blood glucose meter (GLUCOMETER) Use 1 each As Directed as needed. Dispense glucometer brand per patient's insurance at pharmacy discretion. 200 each 3   ? blood glucose test (CONTOUR NEXT TEST STRIPS) strips USE 1 EACH AS DIRECTED 2 TIMES A DAY.. 200 strip 3   ? blood glucose test strips Use 1 each As Directed 2 (two) times a day Dispense brand per patient's insurance at pharmacy discretion.. 100 strip 5   ? blood glucose test strips  Use 1 each As Directed as needed. Dispense brand per patient's insurance at pharmacy discretion. 100 strip 5   ? blood glucose test strips Check sugar twice daily.  Dispense Accu check which works with new meter - per patient's insurance at pharmacy discretion. DX DM E11.9 100 strip 5   ? blood glucose test strips Use 1 each As Directed 2 (two) times a day. Dispense brand per patient's insurance at pharmacy discretion. 200 strip 3   ? blood pressure monitor Kit Check BP couple times a week.  Goal <140/90   DX - HTN and Diabetes 1 each 0   ? blood-glucose meter (ACCU-CHEK LI PLUS METER) Misc Check sugars 2 times daily. DX DM E11.9 1 each 0   ? cholecalciferol, vitamin D3, 1,000 unit tablet Take 1,000 Units by mouth daily.     ? fluticasone (FLONASE ALLERGY RELIEF) 50 mcg/actuation nasal spray 2 sprays each nostril daily. 16 g 3   ? FOLIC ACID/MULTIVITS-MIN/LUT (CENTRUM SILVER ORAL) Take by mouth.     ? gabapentin (NEURONTIN) 300 MG capsule take 3 capsules by mouth three times daily 810 capsule 1   ? generic lancets (FINGERSTIX LANCETS) Use 1 each As Directed 2 (two) times a day. Dispense brand per patient's insurance at pharmacy discretion. 200 each 3   ? generic lancets (MICROLET LANCET) Dispense brand per patient's insurance at pharmacy discretion. Check blood sugar twice daily. 100 each 5   ? hydroCHLOROthiazide (HYDRODIURIL) 25 MG tablet Take 1 tablet (25 mg total) by mouth daily. 90 tablet 1   ? losartan (COZAAR) 100 MG tablet Take 1 tablet (100 mg total) by mouth daily. 90 tablet 2   ? meloxicam (MOBIC) 15 MG tablet Take 1 tablet (15 mg total) by mouth daily. 90 tablet 1   ? metFORMIN (GLUCOPHAGE) 500 MG tablet Take 1 tablet (500 mg total) by mouth 2 (two) times a day with meals. 180 tablet 3   ? omega-3 fatty acids 1,000 mg cap Take by mouth.     ? VOLTAREN 1 % Gel APPLY TO UPPER EXTREMITIES, 2 GM OF GEL TO AFFECTED AREA 4 TIMES DAILY.  DO NOT APPLY MORE THAN 8 GM DAILY TO ANY ONE AFFECTED JOINT. 100 g 3      No current facility-administered medications for this visit.        Physical Exam:  [facility] 09/08[facility]1989   Gen: awake, alert, oriented, no distress  HEENT: nasal turbinates are unremarkable, no oropharyngeal lesions, no cervical or supraclavicular lymphadenopathy  CV: RRR, no M/G/R  Resp: CTAB, no focal crackles or wheezes  Abd: soft, nontender, no palpable organomegaly  Skin: no apparent rashes  Ext: no cyanosis, clubbing or edema  Neuro: alert, nonfocal    Labs:  IgE 260 Oct 2017  eos 7% in Oct 2017, Abs 700  [place] allergy panel was negative  ILD w/u neg 2014    Imaging studies:  reviewed    PFT's  Oct 2017  FEV1/FVC is 77 and is normal.  FEV1 is 84% predicted and is normal.  FVC is 83% predicted and normal.  There was no improvement in spirometry after a single inhaled dose of bronchodilator.  TLC is 99% predicted and is normal.  RV is 123% predicted and is normal.  DLCO is 84% predicted and is normal when it   is corrected for hemoglobin.     Impression:  Full Pulmonary Function Test is normal.    Adalid (Debora Moreno MD  [facility] Pulmonary & Critical Care  Pager [phone]  Clinic [phone]

## 2021-06-20 NOTE — LETTER
"Letter by Tsering Henson MD at      Author: Tsering Henson MD Service: -- Author Type: --    Filed:  Encounter Date: 8/10/2020 Status: (Other)         Wendy Suárez  [address]  [facility] [address]             August 10, 2020         Dear Ms. Caterufus,    Below are the results from your recent visit:    Resulted Orders   Glycosylated Hemoglobin A1c   Result Value Ref Range    Hemoglobin A1c 6.4 (H) <=5.6 %      Comment:      Normal <5.7% Prediabete 5.7-6.4% Diabletes 6.5% or higher - adopted from ADA consensus guidelines   Lipid [place] FASTING   Result Value Ref Range    Cholesterol 187 <=199 mg/dL    Triglycerides 256 (H) <=149 mg/dL    HDL Cholesterol 62 >=50 mg/dL    LDL Calculated 74 <=129 mg/dL    Patient Fasting > 8hrs? Yes    Comprehensive Metabolic Panel   Result Value Ref Range    Sodium 138 136 - 145 mmol/L    Potassium 4.1 3.5 - 5.0 mmol/L    Chloride 102 98 - 107 mmol/L    CO2 26 22 - 31 mmol/L    Anion Gap, Calculation 10 5 - 18 mmol/L    Glucose 109 70 - 125 mg/dL    BUN 27 8 - 28 mg/dL    Creatinine 1.01 0.60 - 1.10 mg/dL    GFR MDRD Af Amer >60 >60 mL/min/1.73m2    GFR MDRD Non Af Amer 53 (L) >60 mL/min/1.73m2    Bilirubin, Total 1.5 (H) 0.0 - 1.0 mg/dL    Calcium 9.2 8.5 - 10.5 mg/dL    Protein, Total 6.4 6.0 - 8.0 g/dL    Albumin 4.0 3.5 - 5.0 g/dL    Alkaline Phosphatase 66 45 - 120 U/L    AST 21 0 - 40 U/L    ALT 27 0 - 45 U/L    Narrative    Fasting Glucose reference range is 70-99 mg/dL per  American Diabetes Association (ADA) guidelines.   Microalbumin, Random Urine   Result Value Ref Range    Microalbumin, Random Urine <0.50 0.00 - 1.99 mg/dL    Creatinine, Urine 29.6 mg/dL    Microalbumin/Creatinine Ratio Random Urine        Comment:      \"Unable to calculate: Creatinine and/or Microalbumin value below detectable level\"    Narrative    Microalbumin, Random Urine  <2.0 mg/dL . . . . . . . . Normal  3.0-30.0 mg/dL . . . . . . Microalbuminuria  >30.0 mg/dL . . . . . .  . " Clinical Proteinuria    Microalbumin/Creatinine Ratio, Random Urine  <20 mg/g . . . . .. . . . Normal   mg/g . . . . . . . Microalbuminuria  >300 mg/g . . . . . . . . Clinical Proteinuria       HM1 (CBC with Diff)   Result Value Ref Range    WBC 6.3 4.0 - 11.0 thou/uL    RBC 4.24 3.80 - 5.40 mill/uL    Hemoglobin 13.3 12.0 - 16.0 g/dL    Hematocrit 39.4 35.0 - 47.0 %    MCV 93 80 - 100 fL    MCH 31.2 27.0 - 34.0 pg    MCHC 33.6 32.0 - 36.0 g/dL    RDW 11.8 11.0 - 14.5 %    Platelets 227 140 - 440 thou/uL    MPV 7.8 7.0 - 10.0 fL    Neutrophils % 53 50 - 70 %    Lymphocytes % 30 20 - 40 %    Monocytes % 8 2 - 10 %    Eosinophils % 9 (H) 0 - 6 %    Basophils % 1 0 - 2 %    Neutrophils Absolute 3.4 2.0 - 7.7 thou/uL    Lymphocytes Absolute 1.9 0.8 - 4.4 thou/uL    Monocytes Absolute 0.5 0.0 - 0.9 thou/uL    Eosinophils Absolute 0.6 (H) 0.0 - 0.4 thou/uL    Basophils Absolute 0.0 0.0 - 0.2 thou/uL        Lab looks stable compared to before. Nothing is worrisome. Her kidney function is mildly decreased but i'm not  worried about it. Please make sure to always stay well hydrated. Diabetes number is stable. Cholesterol numbers  look fine.    Please send result letter with above message.      Tsering Henson     Please call with questions or contact us using [employer].    Sincerely,        Electronically signed by Tsering Henson MD

## 2021-06-20 NOTE — LETTER
Letter by Tsering Henson MD at      Author: Tsering Hneson MD Service: -- Author Type: --    Filed:  Encounter Date: 10/8/2020 Status: (Other)         Wendy Suárez  [address]  [facility] [address]             October 8, 2020         Dear Ms. Suárez,    Below are the results from your recent visit:    Resulted Orders   DXA Bone Density Scan    Narrative    10/6/2020      RE: Wendy Jose Armando  YOB: 1940        Dear Tsering Henson,    Patient Profile:  80 y.o. female, postmenopausal, is here for the follow up bone density   test.   History of fractures - None. Family history of osteoporosis - None.    Family history of hip fracture: None. Smoking history - No. Osteoporosis   treatment past -  Yes;  HRT. Osteoporosis treatment current - No.  Chronic   medical problems - Diabetes Mellitus, Hysterectomy and Oophorectomy. High   risk medications -  None.      Assessment:    1. The spine bone density L1-L2 with T-score 3.9.  Of note, degenerative   changes are seen in the lumbar vertebrae, which have artifactually   elevated the bone mineral density.  2. Femoral bone densities show left femoral neck T- score 0.5 and right   femoral neck T-score 0.4.  3. Trabecular bone score indicates poor trabecular bone architecture.  4.  Left one third radius T score -1.0.    80 y.o. female with NORMAL BONE DENSITY and LOW fracture risk, adjusted   for the TBS, with major osteoporotic fracture risk 9.2% and hip fracture   risk 1.1%.     Since the previous bone density dated  October 6, 2017, there has been a     -9.1% change in the bone density of the spine.  Additionally there has   been a -4.9% change in the left total hip and no statistically significant   % change in the right total hip.  There has been an 11.6% decline noted in   the left distal radius.    Recommendations:  Appropriate calcium, vitamin D supplements, along with balance and weight   bearing exercise recommended with follow up bone  density scan in 3 years.      Bone densitometry was performed on your patient using our [employer] iDXA   densitometer. The results are summarized and a copy of the actual scans   are included for your review. In conformity with the International Society   of Clinical Densitometry's most recent position statement for DXA   interpretation (2015), the diagnosis will be made on the lowest measured   T-score of the lumbar spine, femoral neck, total proximal femur or 33%   radius. Note the change in terminology for diagnostic classification from   OSTEOPENIA to LOW BONE MASS. All trending for sequential exams will be   done using multiple vertebrae or the total proximal femur. Fracture risk   is based on the WHO Fracture Risk Assessment Tool (FRAX). If additional   information is needed or if you would like to discuss the results, please   do not hesitate to call me.       Thank you for referring this patient to [facility].   We are happy to be of service in support of you and your practice. If you   have any questions or suggestions to improve our service, please call me   at [phone].     Sincerely,     Wendy Burden M.D. C.C.D.  Osteoporosis Services, [facility]       There's been decline in your bone density in the left hip and your spine. However no need for medication. Continue with vitamin D supplementation, you should take 2,000 international units a day and continue with exercise, weight bearing/lifting exercises. Repeat bone density in 3 years. Let me know if you have any questions     Please call with questions or contact us using [employer].    Sincerely,        Electronically signed by Tsering Henson MD

## 2021-06-21 NOTE — LETTER
Letter by Adalid Moreno MD at      Author: Adalid Moreno MD Service: -- Author Type: --    Filed:  Encounter Date: 4/13/2021 Status: (Other)         Tsering Henson MD  [address]  [facility] [address]                                  April 13, 2021    Patient: Wendy Suárez   MR Number: [id number]   YOB: 1940   Date of Visit: 4/13/2021     Dear Dr. Sixto MD:    Thank you for referring Wendy Suárez to me for evaluation. Below are the relevant portions of my assessment and plan of care.    If you have questions, please do not hesitate to call me. I look forward to following Wendy along with you.    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 Unfortunately, she was unable to tolerate 2 forms of ICS/LABA. Doing very well without any recent exacerbations, using PAULINO as needed.     Recommendations:  - Continue albuterol inhaler/neb as needed  - Refilled prednisone and azithromycin for her asthma action plan.  -She is UTD flu and pneumococcal vaccinations. She got both doses of COVID-19 vaccine as well. Recommend annual flu shot.  -encouraged her to exercise and remain active.     Follow up in 1 year or sooner if needed.    CCx: follow up of asthma with eosinophilic airway inflammation, annual follow up    HPI: Interim history: I last saw Wendy on 4/2/2020 on a virtual visit. Since that time, she reports she's been doing very well. 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  No exacerbations or ER visits over the last year.  Rare use of rescue inhaler.     FENO trend: 115ppb Feb 2018  -> 43ppb April 2018 ->  60ppb March 2019. -> not checked today as she is doing very well without symptoms.     ROS:  A 12-system review was obtained and was negative with the exception of the symptoms endorsed in the history of present illness.    PMH:  Past Medical History:   Diagnosis Date   ? Asthma     high IgE, elevated FENO 115 highest   ? Cough    ? Hypertension        PSH:  Past Surgical History:   Procedure Laterality Date   ? HYSTERECTOMY Bilateral 1993    was not cancer, for fibroids   ? OOPHORECTOMY Bilateral 1993       Allergies:  Allergies   Allergen Reactions   ? Lisinopril      cough   ? Sulfa (Sulfonamide Antibiotics)        Family HX:  Family History   Problem Relation Age of Onset   ? Diabetes Father    ? Hypertension Father    ? Heart disease Paternal Grandfather    ? Alzheimer's disease Mother        Social Hx:  Social History     Socioeconomic History   ? Marital status:      Spouse name: Not on file   ? Number of children: Not on file   ? Years of education: Not on file   ? Highest education level: Not on file   Occupational History   ? Not on file   Social Needs   ? Financial resource strain: Not on file   ? Food insecurity     Worry: Not on file     Inability: Not on file   ? Transportation needs     Medical: Not on file     Non-medical: Not on file   Tobacco Use   ? Smoking status: Former Smoker     Packs/day: 0.00     Types: Cigarettes   ? Smokeless tobacco: Never Used   ? Tobacco comment: quit 55 years ago, social only   Substance and Sexual Activity   ? Alcohol use: Yes     Alcohol/week: 0.0 - 1.0 standard drinks   ? Drug use: Never   ? Sexual activity: Not Currently   Lifestyle   ? Physical activity     Days per week: Not on file     Minutes per session: Not on file   ? Stress: Not on file   Relationships   ? Social connections     Talks on phone: Not on file     Gets together: Not on file     Attends Caodaism service: Not on file     Active member of club or organization: Not on file     Attends  meetings of clubs or organizations: Not on file     Relationship status: Not on file   ? Intimate partner violence     Fear of current or ex partner: Not on file     Emotionally abused: Not on file     Physically abused: Not on file     Forced sexual activity: Not on file   Other Topics Concern   ? Not on file   Social History Narrative    Lives at [facility]. Does strength training 3 times a week.     Her  has been dealing with medical issues and is now in a wheelchair.         She has 3 kids. She drives her on her.         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        Current Meds:  Current Outpatient Medications   Medication Sig Dispense Refill   ? albuterol (PROAIR HFA) 90 mcg/actuation inhaler INHALE 2 PUFFS BY INHALATION ROUTE EVERY 6 HOURS AS NEEDED FOR WHEEZING 1 Inhaler 11   ? albuterol (PROVENTIL) 2.5 mg /3 mL (0.083 %) nebulizer solution Take 3 mL (2.5 mg total) by nebulization every 6 (six) hours as needed for wheezing. 1 vial 12   ? amitriptyline (ELAVIL) 10 MG tablet TAKE 1 TABLET BY MOUTH ONCE DAILY AT BEDTIME.  TAKE IN ADDITION TO 25 MG TABLET. 90 tablet 1   ? amitriptyline (ELAVIL) 25 MG tablet Take 1 tablet (25 mg) by mouth nightly at bedtime 90 tablet 3   ? ascorbic acid (VITAMIN C) 1000 MG tablet Take 1,000 mg by mouth daily.     ? atorvastatin (LIPITOR) 40 MG tablet Take 1 tablet (40 mg) by mouth ONCE daily 90 tablet 3   ? blood pressure monitor Kit Check BP couple times a week.  Goal <140/90   DX - HTN and Diabetes 1 each 0   ? blood-glucose meter (ACCU-CHEK LI PLUS METER) Misc Check sugars 2 times daily. DX DM E11.9 1 each 0   ? cholecalciferol, vitamin D3, 1,000 unit tablet Take 1,000 Units by mouth daily.     ? fluticasone propionate (FLONASE) 50 mcg/actuation nasal spray use 2 sprays each nostril daily. 16 g 2   ? FOLIC ACID/MULTIVITS-MIN/LUT (CENTRUM SILVER ORAL) Take by mouth.     ? gabapentin (NEURONTIN) 300 MG capsule Take 900 mg 3  times a day 810 capsule 2   ? hydroCHLOROthiazide (HYDRODIURIL) 25 MG tablet TAKE 1 TABLET (25 MG) BY MOUTH ONCE DAILY 90 tablet 3   ? losartan (COZAAR) 100 MG tablet Take 1 tablet (100 mg total) by mouth daily. 90 tablet 1   ? meloxicam (MOBIC) 15 MG tablet Take 1 tablet (15 mg) by mouth once daily 90 tablet 1   ? ONETOUCH VERIO TEST STRIPS strips test 2 times a day 200 strip 1   ? VOLTAREN 1 % Gel APPLY TO UPPER EXTREMITIES, 2 GM OF GEL TO AFFECTED AREA 4 TIMES DAILY.  DO NOT APPLY MORE THAN 8 GM DAILY TO ANY ONE AFFECTED JOINT. 100 g 3   ? azithromycin (ZITHROMAX) 250 MG tablet Take 500 mg (2 x 250 mg tablets) on day 1 followed by 250 mg (1 tablet) on days 2-5. 6 tablet 3   ? predniSONE (DELTASONE) 20 MG tablet Take 20 mg by mouth daily. 30 tablet 0     No current facility-administered medications for this visit.        Physical Exam:  /60   Pulse 87   Wt 149 lb 3.2 oz (67.7 kg)   LMP 09/08/1989   SpO2 97% Comment: RA  BMI 26.85 kg/m    Gen: awake, alert, oriented, no distress  HEENT: nasal turbinates are unremarkable, no oropharyngeal lesions, no cervical or supraclavicular lymphadenopathy  CV: RRR, no M/G/R  Resp: CTAB, no focal crackles or wheezes  Abd: soft, nontender, no palpable organomegaly  Skin: no apparent rashes  Ext: no cyanosis, clubbing or edema  Neuro: alert, nonfocal    Labs:  IgE 260 Oct 2017  CBC NO eos Feb 2021 (previously had eos 7% in Oct 2017, Abs 700)  [place] allergy panel was negative  ILD w/u neg 2014    Imaging studies:  XR CHEST PA AND LATERAL3/8/2017 9:32 AMINDICATION: Cough and end inspiratoy wheeze on right.  Eval for any infiltrateCOMPARISON: None.FINDINGS: Lungs are mildly hyperinflated but clear. Heart size normal. Degenerative changes in the spine.This report was   electronically interpreted by: Dr. Gabriel Powers MD ON 03/08/2017 at 12:08    PFT's  Oct 2017  FEV1/FVC is 77 and is normal.  FEV1 is 84% predicted and is normal.  FVC is 83% predicted and normal.  There was  no improvement in spirometry after a single inhaled dose of bronchodilator.  TLC is 99% predicted and is normal.  RV is 123% predicted and is normal.  DLCO is 84% predicted and is normal when it   is corrected for hemoglobin.     Impression:  Full Pulmonary Function Test is normal.    Adalid Moreno MD (Avi)  [facility]  Pager [phone]  Clinic [phone]

## 2021-06-21 NOTE — PROGRESS NOTES
Assessment & Plan:  1. Diabetes mellitus (H)  Patient with diabetes and peripheral  Neuropathy.  Plan to continue gabapentin at this time.  Patient's diabetes is under good control control will continue the metformin.  Will follow up with an exam in 6 months  - Glycosylated Hemoglobin A1c  - Microalbumin, Random Urine  - Diabetes foot exam    2. Headache  Patient with history of headaches doing well with prophylaxis with amitriptyline.  We are going to increase the amitriptyline to see if it will help with her sleep issues  - amitriptyline (ELAVIL) 25 MG tablet; Take 1 tablet (25 mg total) by mouth at bedtime.  Dispense: 90 tablet; Refill: 1    3. Insomnia, unspecified type  Patient is having issues with sleeping.  We discussed how the gabapentin will help her fall asleep but we will also try amitriptyline.  Will increase to 25 mg.  If no improvement in the week she will add her 10 mg to go up to 35 mg and then 45 mg if no improvement.  Patient will call if there is any side effects or no improvement of her symptoms  - amitriptyline (ELAVIL) 25 MG tablet; Take 1 tablet (25 mg total) by mouth at bedtime.  Dispense: 90 tablet; Refill: 1    4. Type II diabetes mellitus (H)  See above    5. Essential hypertension  Blood pressure well controlled on current regimen.  Plan to continue the Cozaar and hydrochlorothiazide.  Will recheck in 6 months        Orders Placed This Encounter   Procedures     Influenza High Dose, Seasonal     Glycosylated Hemoglobin A1c     Microalbumin, Random Urine     Diabetes foot exam     Medications Discontinued During This Encounter   Medication Reason     fluticasone-vilanterol (BREO ELLIPTA) 200-25 mcg/dose DsDv inhaler      traZODone (DESYREL) 50 MG tablet Therapy completed     amitriptyline (ELAVIL) 10 MG tablet Reorder       Return in about 6 months (around 4/17/2019) for Recheck.          This note was created use Dragon Dictation.  There may be sound alike errors present.       Chief  Complaint:   Chief Complaint   Patient presents with     Hypertension     fasting today     Diabetes     medication check       History of Present Illness:  Wendy is a 78 y.o. female presenting to the clinic today for medication check.. She has a history of diabetes and is on metformin.  Patient states her sugars are well controlled at home.  When she had a virus she noticed her morning sugars were high at 134.  That is the highest her sugars have been at home.  Patient does have a history of peripheral neuropathy.  The gabapentin has helped to decrease the burning of her feet and a little bit of her hands.      Patient is concerned because she has had problems sleeping.  Patient tried trazodone but stated she felt too sleepy the next day.  Patient states she has problems falling asleep.  Go to bed at 1030 not fall asleep until 1230.  She will also awakened during the night and have difficulty falling asleep.  She is on low-dose amitriptyline for her headaches and wonders if that could help.  Her  is on Ambien it works well for him and wonders if he would be a good medication for her.    I reviewed allergy notes and pulmonology notes.  Patient has been seeing them for chronic cough.  Patient was prescribed Brio from pulmonology and states that it caused her voice to be hoarse and sores in her mouth so she stopped it.  She saw Dr. Hurtado in allergy and I reviewed the notes.  Patient does not have any allergens triggering her cough.  Patient notes that when her cough is bad the albuterol will help.  Does not use albuterol every day.  Review of Systems:  All other systems are negative.     PFSH:  Patient has a history of headaches that have responded well to amitriptyline.  Plan to continue it at this time    Tobacco Use:  History   Smoking Status     Former Smoker     Types: Cigarettes   Smokeless Tobacco     Never Used     Comment: quit 55 years ago, social only       Vitals:  Vitals:    10/17/18 1026   BP: 137/64    Patient Site: Left Arm   Patient Position: Sitting   Cuff Size: Adult Regular   Pulse: 76   Resp: 16   Temp: 97.9  F (36.6  C)   TempSrc: Oral   Weight: 154 lb (69.9 kg)     Wt Readings from Last 3 Encounters:   10/17/18 154 lb (69.9 kg)   07/16/18 154 lb 3.2 oz (69.9 kg)   05/17/18 151 lb (68.5 kg)     Body mass index is 26.43 kg/(m^2).      Physical Exam:  Constitutional:  Reveals an alert, cooperative,  female in no acute distress.  Vitals:  Per nursing notes.  Ears:  Clear.  Oropharynx:  Without posterior nasal drainage or thrush.  Neck:  Supple, no lymphadenopathy,  thyroid not palpable.  Cardiac:  Regular rate and rhythm without murmurs, rubs, or gallops. Carotids without bruits. Legs without edema.   Lungs: Clear.  Respiratory effort normal.  Abdomen:  non-tender, non-distended.  Neither liver nor spleen palpable.  Skin:   Without rash, bruise, or palpable lesions.    Diabetic foot exam:   Left: Reflexes 2+      Proprioception normal      Filament test absent  Right: Reflexes 2+      Proprioception normal      Filament test absent    Rheumatologic: Normal joints and nails of the hands.  Neurologic:  No gross focal deficits.    Psychiatric:  Mood appropriate, memory intact.     Data Reviewed:  Additional history summarized (from old records or history from someone other than the patient or another healthcare provider) (2 TOTAL): 2 reviewed pulmonology and allergy notes.     Decision to obtain extra information (old records requested or history from another person or accessing Care Everywhere) (1 TOTAL): None.     Radiology tests summarized or ordered (XRAY/CT/MRI/DXA) (1 TOTAL): None.    Labs reviewed or ordered (1 TOTAL): 1 ordered labs and reviewed labs.    Medicine tests summarized or ordered (EKG/ECHO) (1 TOTAL): 1 reviewed PFT.    Independent review of EKG or X-Ray (2 EACH): None.       The visit lasted a total of 30 minutes face to face with the patient. Over 50% of the time was spent counseling and  educating the patient about insomnia  .        Medications:  Current Outpatient Prescriptions   Medication Sig Dispense Refill     albuterol (PROAIR HFA) 90 mcg/actuation inhaler INHALE 2 PUFFS BY INHALATION ROUTE EVERY 6 HOURS AS NEEDED FOR WHEEZING 1 Inhaler 11     amitriptyline (ELAVIL) 25 MG tablet Take 1 tablet (25 mg total) by mouth at bedtime. 90 tablet 1     ascorbic acid (VITAMIN C) 1000 MG tablet Take 1,000 mg by mouth daily.       atorvastatin (LIPITOR) 40 MG tablet TAKE 1 TABLET (40 MG) BY MOUTH ONCE DAILY 90 tablet 1     blood glucose test (CONTOUR NEXT STRIPS) strips USE 1 EACH AS DIRECTED 2 TIMES A DAY. 100 strip 2     cholecalciferol, vitamin D3, 1,000 unit tablet Take 1,000 Units by mouth daily.       FOLIC ACID/MULTIVITS-MIN/LUT (CENTRUM SILVER ORAL) Take by mouth.       gabapentin (NEURONTIN) 300 MG capsule take 3 capsules by mouth three times daily 810 capsule 2     generic lancets (FINGERSTIX LANCETS) 1 each by In Vitro route 2 (two) times a day. 100 each 3     hydroCHLOROthiazide (HYDRODIURIL) 25 MG tablet TAKE ONE TABLET BY MOUTH ONCE DAILY  90 tablet 1     losartan (COZAAR) 100 MG tablet TAKE ONE TABLET BY MOUTH ONCE DAILY  90 tablet 2     meloxicam (MOBIC) 15 MG tablet Take 1 tablet (15 mg total) by mouth daily. 90 tablet 0     metFORMIN (GLUCOPHAGE) 500 MG tablet TAKE 1 TABLET BY MOUTH 2 TIMES PER  tablet 2     omega-3 fatty acids 1,000 mg cap Take by mouth.       VOLTAREN 1 % Gel APPLY TO UPPER EXTREMITIES, 2 GM OF GEL TO AFFECTED AREA 4 TIMES DAILY.  DO NOT APPLY MORE THAN 8 GM DAILY TO ANY ONE AFFECTED JOINT. 100 g 3     albuterol (PROVENTIL) 2.5 mg /3 mL (0.083 %) nebulizer solution Take 3 mL (2.5 mg total) by nebulization every 6 (six) hours as needed for wheezing. 380 mL 11     blood pressure monitor Kit Check BP couple times a week.  Goal <140/90   DX - HTN and Diabetes 1 each 0     No current facility-administered medications for this visit.        Total Data Points: 4

## 2021-06-21 NOTE — LETTER
Letter by Tsering Henson MD at      Author: Tsering Henson MD Service: -- Author Type: --    Filed:  Encounter Date: 2/11/2021 Status: (Other)       My Asthma Action Plan     Name: Wendy Suárez   YOB: 1940  Date: 2/11/2021   My doctor: Tsering Henson MD   My clinic: [facility]        My Rescue Medicine:   Albuterol (Proair/Ventolin/Proventil HFA) 2-4 puffs EVERY 4 HOURS as needed. Use a spacer if recommended by your provider.   My Asthma Severity:   Intermittent/Exercise Induced  Know your asthma triggers: upper respiratory infections             GREEN ZONE   Good Control    I feel good    No cough or wheeze    Can work, sleep and play without asthma symptoms     Take your asthma control medicine every day.     1. If exercise triggers your asthma, take your rescue medication    15 minutes before exercise or sports, and    During exercise if you have asthma symptoms  2. Spacer to use with inhaler: If you have a spacer, make sure to use it with your inhaler             YELLOW ZONE Getting Worse  I have ANY of these:    I do not feel good    Cough or wheeze    Chest feels tight    Wake up at night 1. Keep taking your Green Zone medications  2. Start taking your rescue medicine:    every 20 minutes for up to 1 hour. Then every 4 hours for 24-48 hours.  3. If you stay in the Yellow Zone for more than 12-24 hours, contact your doctor.  4. If you do not return to the Green Zone in 12-24 hours or you get worse, start taking your oral steroid medicine if prescribed by your provider.           RED ZONE Medical Alert - Get Help  I have ANY of these:    I feel awful    Medicine is not helping    Breathing getting harder    Trouble walking or talking    Nose opens wide to breathe     1. Take your rescue medicine NOW  2. If your provider has prescribed an oral steroid medicine, start taking it NOW  3. Call your doctor NOW  4. If you are still in the Red Zone after 20 minutes  and you have not reached your doctor:    Take your rescue medicine again and    Call 911 or go to the emergency room right away    See your regular doctor within 2 weeks of an Emergency Room or Urgent Care visit for follow-up treatment.          Annual Reminders:  Meet with Asthma Educator,  Flu Shot in the Fall, consider Pneumonia Vaccination for patients with asthma (aged 19 and older).    Pharmacy:   [id number] - [place], [place] - [address]  [address]  [place] [address]  Phone: [phone] Fax: [phone]      Electronically signed by Tsering Henson MD   Date: 02/11/21                      Asthma Triggers  How To Control Things That Make Your Asthma Worse    Triggers are things that make your asthma worse.  Look at the list below to help you find your triggers and what you can do about them.  You can help prevent asthma flare-ups by staying away from your triggers.      Trigger                                                          What you can do   Cigarette Smoke  Tobacco smoke can make asthma worse. Do not allow smoking in your home, car or around you.  Be sure no one smokes at a nina day care or school.  If you smoke, ask your health care provider for ways to help you quit.  Ask family members to quit too.  Ask your health care provider for a referral to Quit Plan to help you quit smoking, or call 4-854-503-PLAN.     Colds, Flu, Bronchitis  These are common triggers of asthma. Wash your hands often.  Dont touch your eyes, nose or mouth.  Get a flu shot every year.     Dust Mites  These are tiny bugs that live in cloth or carpet. They are too small to see. Wash sheets and blankets in hot water every week.   Encase pillows and mattress in dust mite proof covers.  Avoid having carpet if you can. If you have carpet, vacuum weekly.   Use a dust mask and HEPA vacuum.   Pollen and Outdoor Mold  Some people are allergic to trees, grass, or weed pollen, or molds. Try  to keep your windows closed.  Limit time out doors when pollen count is high.   Ask you health care provider about taking medicine during allergy season.     Animal Dander  Some people are allergic to skin flakes, urine or saliva from pets with fur or feathers. Keep pets with fur or feathers out of your home.    If you cant keep the pet outdoors, then keep the pet out of your bedroom.  Keep the bedroom door closed.  Keep pets off cloth furniture and away from stuffed toys.     Mice, Rats, and Cockroaches  Some people are allergic to the waste from these pests.   Cover food and garbage.  Clean up spills and food crumbs.  Store grease in the refrigerator.   Keep food out of the bedroom.   Indoor Mold  This can be a trigger if your home has high moisture. Fix leaking faucets, pipes, or other sources of water.   Clean moldy surfaces.  Dehumidify basement if it is damp and smelly.   Smoke, Strong Odors, and Sprays  These can reduce air quality. Stay away from strong odors and sprays, such as perfume, powder, hair spray, paints, smoke incense, paint, cleaning products, candles and new carpet.   Exercise or Sports  Some people with asthma have this trigger. Be active!  Ask your doctor about taking medicine before sports or exercise to prevent symptoms.    Warm up for 5-10 minutes before and after sports or exercise.     Other Triggers of Asthma  Cold air:  Cover your nose and mouth with a scarf.  Sometimes laughing or crying can be a trigger.  Some medicines and food can trigger asthma.

## 2021-06-23 NOTE — TELEPHONE ENCOUNTER
"Pt reports \"ongoing cold symptoms x four weeks.\"  \"Now it's doing the same thing as in the past -> \"going into my lungs.\"  New onset wheezing x four days.  No fevers.  No facial pain or headaches.    Productive cough, however difficult to sleep at night due to wheezing.  \"Kept my  awake all night with my coughing    Has been using albuterol nebs q 6 hours on a scheduled basis for the past four days.  Also using Flonase, Mucinex.    Pt reports prednisone with antibiotics has worked well in the past.  Agrees to schedule clinical eval today.  Warm transferred to a  for this purpose now.    Leidy Reina RN BSBA  [facility] RN Triage     Reason for Disposition    Wheezing is present    Known COPD or other severe lung disease (i.e., bronchiectasis, cystic fibrosis, lung surgery) and worsening symptoms (i.e., increased sputum purulence or amount, increased breathing difficulty)    SEVERE coughing spells (e.g., whooping sound after coughing, vomiting after coughing)    Protocols used: COUGH-A-OH      "

## 2021-06-23 NOTE — TELEPHONE ENCOUNTER
Refill Approved    Rx renewed per Medication Renewal Policy. Medication was last renewed on 10.11.18 note Will follow up with an exam in 6 months .    Radha Gottlieb, [facility] Triage/Med Refill 1/16/2019     Requested Prescriptions   Pending Prescriptions Disp Refills     hydroCHLOROthiazide (HYDRODIURIL) 25 MG tablet 90 tablet 1     Sig: Take 1 tablet (25 mg total) by mouth daily.    Diuretics/Combination Diuretics Refill Protocol  Passed - 1/16/2019  8:07 AM       Passed - Visit with PCP or prescribing provider visit in past 12 months    Last office visit with prescriber/PCP: 10/17/2018 Roxy Wall MD OR same dept: 10/17/2018 Roxy Wall MD OR same specialty: 10/17/2018 Roxy Wall MD  Last physical: 7/2/2015 Last MTM visit: Visit date not found   Next visit within 3 mo: Visit date not found  Next physical within 3 mo: Visit date not found  Prescriber OR PCP: Roxy Wall MD  Last diagnosis associated with med order: 1. Benign Essential Hypertension  - hydroCHLOROthiazide (HYDRODIURIL) 25 MG tablet; Take 1 tablet (25 mg total) by mouth daily.  Dispense: 90 tablet; Refill: 1    If protocol passes may refill for 12 months if within 3 months of last provider visit (or a total of 15 months).            Passed - Serum Potassium in past 12 months     Lab Results   Component Value Date    Potassium 4.3 04/25/2018            Passed - Serum Sodium in past 12 months     Lab Results   Component Value Date    Sodium 138 04/25/2018            Passed - Blood pressure on file in past 12 months    BP Readings from Last 1 Encounters:   10/17/18 137/64            Passed - Serum Creatinine in past 12 months     Creatinine   Date Value Ref Range Status   04/25/2018 0.90 0.60 - 1.10 mg/dL Final

## 2021-06-23 NOTE — TELEPHONE ENCOUNTER
Refill Approved    Rx renewed per Medication Renewal Policy. Medication was last renewed on 10/17/18.    Ailyn Ortiz, [facility] Triage/Med Refill 1/19/2019     Requested Prescriptions   Pending Prescriptions Disp Refills     amitriptyline (ELAVIL) 25 MG tablet [Pharmacy Med Name: Amitriptyline HCl Oral Tablet 25 MG] 90 tablet 0     Sig: Take 1 tablet (25 mg) by mouth nightly at bedtime    Tricyclics/Misc Antidepressant/Antianxiety Meds Refill Protocol Passed - 1/19/2019  4:52 PM       Passed - PCP or prescribing provider visit in last year    Last office visit with prescriber/PCP: 10/17/2018 Roxy Wall MD OR same dept: 10/17/2018 Roxy Wall MD OR same specialty: 10/17/2018 Roxy Wall MD  Last physical: 7/2/2015 Last MTM visit: Visit date not found   Next visit within 3 mo: Visit date not found  Next physical within 3 mo: Visit date not found  Prescriber OR PCP: Roxy Wall MD  Last diagnosis associated with med order: 1. Headache  - amitriptyline (ELAVIL) 25 MG tablet [Pharmacy Med Name: Amitriptyline HCl Oral Tablet 25 MG]; Take 1 tablet (25 mg) by mouth nightly at bedtime  Dispense: 90 tablet; Refill: 0    2. Insomnia, unspecified type  - amitriptyline (ELAVIL) 25 MG tablet [Pharmacy Med Name: Amitriptyline HCl Oral Tablet 25 MG]; Take 1 tablet (25 mg) by mouth nightly at bedtime  Dispense: 90 tablet; Refill: 0    If protocol passes may refill for 12 months if within 3 months of last provider visit (or a total of 15 months).

## 2021-06-23 NOTE — PROGRESS NOTES
Assessment & Plan:  1. Bronchitis  azithromycin (ZITHROMAX) 250 MG tablet    predniSONE (DELTASONE) 20 MG tablet     Follow up with us if symptoms are not improving after the course of antibiotics is complete.    Take antibiotics with food.    Use Mucinex 12 hour twice daily and loosen mucus. Increase water intake.    Saline spray in both nostrils for moisture and to thin mucus.    Use a humidifier at home to moisten the air.      There are no Patient Instructions on file for this visit.    No orders of the defined types were placed in this encounter.    Medications Discontinued During This Encounter   Medication Reason     amitriptyline (ELAVIL) 10 MG tablet Reorder           Chief Complaint:   Chief Complaint   Patient presents with     Wheezing     Cough       History of Present Illness:  Wendy is a 78 y.o. female presenting to the clinic today with wheezing and cough for 5 weeks. Symptoms seem to be worsening with increased shortness of breath, wheezing over the past couple of weeks. Nothing seems to be improving. Because of history of bronchospasm patient seems to get worse chest infections more quickly. She has not had a fever or chills. Feels very fatigued.     Review of Systems:  All other systems are negative except as noted above.     PFSH:  Reviewed and updated.     Tobacco Use:  Social History     Tobacco Use   Smoking Status Former Smoker     Types: Cigarettes   Smokeless Tobacco Never Used   Tobacco Comment    quit 55 years ago, social only       Vitals:  Vitals:    01/24/19 1108   BP: 135/90   Patient Site: Left Arm   Patient Position: Sitting   Cuff Size: Adult Regular   Pulse: 92   Resp: 14   SpO2: 96%   Weight: 155 lb 2 oz (70.4 kg)     Wt Readings from Last 3 Encounters:   01/24/19 155 lb 2 oz (70.4 kg)   10/17/18 154 lb (69.9 kg)   07/16/18 154 lb 3.2 oz (69.9 kg)       Physical Exam:  Constitutional:  Reveals an alert, cooperative, 78 year old female in no acute distress.  Vitals:  Per nursing  notes.  Eyes: PERRLA, funduscopic exam within normal limits.  HENT: TMs clear bilaterally,Oropharynx with erythema, clear posterior nasal drainage, no thrush. Neck supple,  thyroid not palpable. Nasal mucosa boggy with increased rhinorrhea. Sinuses nontender to palpation.   Cardiovascular:  Regular rate and rhythm without murmurs, rubs, or gallops. Carotids without bruits. Legs without edema.   Respiratory: Clear.  Respiratory effort normal.  Lymph: Anterior cervical lymphadenopathy present, Posterior cervical lymphadenopathy not present, lymph nodes non tender to palpation.   Psychiatric:  Mood appropriate, alert and oriented x3.    Data Reviewed:  Additional History from Old Records or Another Person Summarized (2 total): None.     Decision to Obtain Extra information (1 total): None.     Radiology Tests Summarized and Ordered (XRAY/CT/MRI/DXA) (1 total): None.    Labs Reviewed and Ordered (1 total):0    Medicine Tests Summarized and Ordered (EKG/ECHO/COLONOSCOPY/EGD) (1 total): None.    Independent Review of EKG or X-Ray (2 each): None.    The visit lasted a total of 20 minutes face to face with the patient. Over 50% of the time was spent counseling and educating the patient about plan of care.    Medications:  Current Outpatient Medications   Medication Sig Dispense Refill     albuterol (PROAIR HFA) 90 mcg/actuation inhaler INHALE 2 PUFFS BY INHALATION ROUTE EVERY 6 HOURS AS NEEDED FOR WHEEZING 1 Inhaler 11     amitriptyline (ELAVIL) 10 MG tablet Take 1 tablet (10 mg total) by mouth at bedtime. Take in addition to 25 mg tablet. 90 tablet 1     amitriptyline (ELAVIL) 25 MG tablet Take 1 tablet (25 mg) by mouth nightly at bedtime 90 tablet 3     ascorbic acid (VITAMIN C) 1000 MG tablet Take 1,000 mg by mouth daily.       atorvastatin (LIPITOR) 40 MG tablet TAKE 1 TABLET (40 MG) BY MOUTH ONCE DAILY 90 tablet 1     blood glucose meter (GLUCOMETER) Use 1 each As Directed as needed. Dispense glucometer brand per  patient's insurance at pharmacy discretion. Dx - Diabetes 1 each 0     blood glucose test (CONTOUR NEXT TEST STRIPS) strips USE 1 EACH AS DIRECTED 2 TIMES A DAY.. 200 strip 3     blood glucose test strips Use 1 each As Directed 2 (two) times a day Dispense brand per patient's insurance at pharmacy discretion.. 100 strip 5     blood glucose test strips Use 1 each As Directed as needed. Dispense brand per patient's insurance at pharmacy discretion. 100 strip 5     blood glucose test strips Check sugar twice daily.  Dispense Accu check which works with new meter - per patient's insurance at pharmacy discretion. DX DM E11.9 100 strip 5     blood-glucose meter (ACCU-CHEK LI PLUS METER) Misc Check sugars 2 times daily. DX DM E11.9 1 each 0     FOLIC ACID/MULTIVITS-MIN/LUT (CENTRUM SILVER ORAL) Take by mouth.       hydroCHLOROthiazide (HYDRODIURIL) 25 MG tablet Take 1 tablet (25 mg total) by mouth daily. 90 tablet 0     losartan (COZAAR) 100 MG tablet TAKE ONE TABLET BY MOUTH ONCE DAILY  90 tablet 2     meloxicam (MOBIC) 15 MG tablet Take 1 tablet (15 mg total) by mouth daily. 90 tablet 0     metFORMIN (GLUCOPHAGE) 500 MG tablet TAKE 1 TABLET BY MOUTH 2 TIMES PER  tablet 2     omega-3 fatty acids 1,000 mg cap Take by mouth.       VOLTAREN 1 % Gel APPLY TO UPPER EXTREMITIES, 2 GM OF GEL TO AFFECTED AREA 4 TIMES DAILY.  DO NOT APPLY MORE THAN 8 GM DAILY TO ANY ONE AFFECTED JOINT. 100 g 3     albuterol (PROVENTIL) 2.5 mg /3 mL (0.083 %) nebulizer solution Take 3 mL (2.5 mg total) by nebulization every 6 (six) hours as needed for wheezing. 380 mL 11     azithromycin (ZITHROMAX) 250 MG tablet Take 2 tablets on day one, and 1 tablet for the next four days. 6 tablet 0     blood pressure monitor Kit Check BP couple times a week.  Goal <140/90   DX - HTN and Diabetes 1 each 0     cholecalciferol, vitamin D3, 1,000 unit tablet Take 1,000 Units by mouth daily.       gabapentin (NEURONTIN) 300 MG capsule take 3 capsules by  mouth three times daily 810 capsule 2     generic lancets (MICROLET LANCET) Dispense brand per patient's insurance at pharmacy discretion. Check blood sugar twice daily. 100 each 5     predniSONE (DELTASONE) 20 MG tablet Take 40 mg by mouth daily for 5 days. 10 tablet 0     No current facility-administered medications for this visit.        Total Data Points:     DONALD Lr, CNP    This note has been dictated using voice recognition software. Any grammatical or context distortions are unintentional and inherent to the software

## 2021-06-24 NOTE — TELEPHONE ENCOUNTER
Refill Approved    Rx renewed per Medication Renewal Policy. Medication was last renewed on 6/1/18.    Last office visit 1/24/19    Saroj Camilo, [facility] Connection Triage/Med Refill 3/12/2019     Requested Prescriptions   Pending Prescriptions Disp Refills     losartan (COZAAR) 100 MG tablet 90 tablet 2     Sig: Take 1 tablet (100 mg total) by mouth daily.    Angiotensin Receptor Blocker Protocol Passed - 3/12/2019 10:36 AM       Passed - PCP or prescribing provider visit in past 12 months      Last office visit with prescriber/PCP: 10/17/2018 Roxy Wall MD OR same dept: 1/24/2019 Elle Tamez FNP OR same specialty: 1/24/2019 Elle Tamez FNP  Last physical: 7/2/2015 Last MTM visit: Visit date not found   Next visit within 3 mo: Visit date not found  Next physical within 3 mo: Visit date not found  Prescriber OR PCP: Roxy Wall MD  Last diagnosis associated with med order: 1. Benign Essential Hypertension  - losartan (COZAAR) 100 MG tablet; Take 1 tablet (100 mg total) by mouth daily.  Dispense: 90 tablet; Refill: 2    If protocol passes may refill for 12 months if within 3 months of last provider visit (or a total of 15 months).            Passed - Serum potassium within the past 12 months    Lab Results   Component Value Date    Potassium 4.3 04/25/2018            Passed - Blood pressure filed in past 12 months    BP Readings from Last 1 Encounters:   03/08/19 132/68            Passed - Serum creatinine within the past 12 months    Creatinine   Date Value Ref Range Status   04/25/2018 0.90 0.60 - 1.10 mg/dL Final

## 2021-06-24 NOTE — TELEPHONE ENCOUNTER
Called to clarify see see what fluticasone since there were three in patient history.   Pharmacist clarified that they wanted a refill on the flonase 50mcg.  Rx id T'd up.

## 2021-06-24 NOTE — TELEPHONE ENCOUNTER
Medication Request  Medication name: Fluticasone propionate 50 mcg inhaler   Pharmacy Name and Location: [facility] pharmacy   Reason for request: pharmacy requested.   When did you use medication last?:  Last refill 4/21/18  Okay to leave a detailed message: yes

## 2021-06-24 NOTE — PROGRESS NOTES
Pulmonary Clinic Follow-up Visit    Assessment and Plan:   Wendy Suárez is a 77 y.o. 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 Unfortunately, she was unable to tolerate 2 forms of ICS/LABA. In between flare ups she tends to do very well. She appears to have eosinophilic airway inflammation that is triggered by URI's in the winter. She is content with her current regimen of albuterol nebulizers and occasional prednisone/azithromycin use and doesn't want to add controller medications at this time. FENO is elevated today at 60ppb consistent with recent asthma exacerbation history.     Recommendations:  - Continue albuterol as needed  - will Rx prednisone and azithromycin for asthma action plan; she will call if she feels she needs to start these.  - she declines any ICS controller medications at this point. Since her flare ups seem to be mild and she's not required any hospitalizations or ED visits, I haven't pushed the issue with her.  -She is UTD with flu shot, PSV23 and PCV13. Recommend annual flu shot in the Fall.  -encouraged her to exercise and remain active.    Follow up in 6 months    CCx: follow up of asthma with eosinophilic airway inflammation    HPI: Interim history: I last saw Wendy on 7/16/2018. Since that time, she's had several asthma flare ups/bronchitis episodes requiring prednisone and antibiotics. She clears up very quickly with these.  Other than that she's been doing very well.  She has excellent exercise tolerance.  She previously did not tolerate Symbicort or Breo.      FENO trend: 115ppb Feb 2018  -> 43ppb April 2018 -> 60ppb March 2019.    ROS:  A 12-system review was obtained and was negative with the exception of the symptoms endorsed in the history of present illness.    PMH:  Past Medical History:   Diagnosis Date     Asthma      Cough       Hypertension        PSH:  Past Surgical History:   Procedure Laterality Date     HYSTERECTOMY Bilateral 1993     OOPHORECTOMY Bilateral 1993       Allergies:  Allergies   Allergen Reactions     Lisinopril      cough     Sulfa (Sulfonamide Antibiotics)        Family HX:  Family History   Problem Relation Age of Onset     Diabetes Father      Hypertension Father      Heart disease Paternal Grandfather        Social Hx:  Social History     Socioeconomic History     Marital status:      Spouse name: Not on file     Number of children: Not on file     Years of education: Not on file     Highest education level: Not on file   Occupational History     Not on file   Social Needs     Financial resource strain: Not on file     Food insecurity:     Worry: Not on file     Inability: Not on file     Transportation needs:     Medical: Not on file     Non-medical: Not on file   Tobacco Use     Smoking status: Former Smoker     Types: Cigarettes     Smokeless tobacco: Never Used     Tobacco comment: quit 55 years ago, social only   Substance and Sexual Activity     Alcohol use: No     Drug use: Not on file     Sexual activity: Not on file   Lifestyle     Physical activity:     Days per week: Not on file     Minutes per session: Not on file     Stress: Not on file   Relationships     Social connections:     Talks on phone: Not on file     Gets together: Not on file     Attends Faith service: Not on file     Active member of club or organization: Not on file     Attends meetings of clubs or organizations: Not on file     Relationship status: Not on file     Intimate partner violence:     Fear of current or ex partner: Not on file     Emotionally abused: Not on file     Physically abused: Not on file     Forced sexual activity: Not on file   Other Topics Concern     Not on file   Social History Narrative     Not on file       Current Meds:  Current Outpatient Medications   Medication Sig Dispense Refill     albuterol  (PROAIR HFA) 90 mcg/actuation inhaler INHALE 2 PUFFS BY INHALATION ROUTE EVERY 6 HOURS AS NEEDED FOR WHEEZING 1 Inhaler 11     albuterol (PROVENTIL) 2.5 mg /3 mL (0.083 %) nebulizer solution Take 3 mL (2.5 mg total) by nebulization every 6 (six) hours as needed for wheezing. 380 mL 11     amitriptyline (ELAVIL) 10 MG tablet Take 1 tablet (10 mg total) by mouth at bedtime. Take in addition to 25 mg tablet. 90 tablet 1     amitriptyline (ELAVIL) 25 MG tablet Take 1 tablet (25 mg) by mouth nightly at bedtime 90 tablet 3     ascorbic acid (VITAMIN C) 1000 MG tablet Take 1,000 mg by mouth daily.       atorvastatin (LIPITOR) 40 MG tablet TAKE 1 TABLET (40 MG) BY MOUTH ONCE DAILY 90 tablet 1     azithromycin (ZITHROMAX) 250 MG tablet Take 2 tablets on day one, and 1 tablet for the next four days. 6 tablet 0     blood glucose meter (GLUCOMETER) Use 1 each As Directed as needed. Dispense glucometer brand per patient's insurance at pharmacy discretion. Dx - Diabetes 1 each 0     blood glucose test (CONTOUR NEXT TEST STRIPS) strips USE 1 EACH AS DIRECTED 2 TIMES A DAY.. 200 strip 3     blood glucose test strips Use 1 each As Directed 2 (two) times a day Dispense brand per patient's insurance at pharmacy discretion.. 100 strip 5     blood glucose test strips Use 1 each As Directed as needed. Dispense brand per patient's insurance at pharmacy discretion. 100 strip 5     blood glucose test strips Check sugar twice daily.  Dispense Accu check which works with new meter - per patient's insurance at pharmacy discretion. DX DM E11.9 100 strip 5     blood pressure monitor Kit Check BP couple times a week.  Goal <140/90   DX - HTN and Diabetes 1 each 0     blood-glucose meter (ACCU-CHEK LI PLUS METER) Misc Check sugars 2 times daily. DX DM E11.9 1 each 0     cholecalciferol, vitamin D3, 1,000 unit tablet Take 1,000 Units by mouth daily.       fluticasone (FLONASE ALLERGY RELIEF) 50 mcg/actuation nasal spray 2 sprays each nostril  daily. 16 g 3     FOLIC ACID/MULTIVITS-MIN/LUT (CENTRUM SILVER ORAL) Take by mouth.       gabapentin (NEURONTIN) 300 MG capsule take 3 capsules by mouth three times daily 810 capsule 2     generic lancets (MICROLET LANCET) Dispense brand per patient's insurance at pharmacy discretion. Check blood sugar twice daily. 100 each 5     hydroCHLOROthiazide (HYDRODIURIL) 25 MG tablet Take 1 tablet (25 mg total) by mouth daily. 90 tablet 0     losartan (COZAAR) 100 MG tablet TAKE ONE TABLET BY MOUTH ONCE DAILY  90 tablet 2     meloxicam (MOBIC) 15 MG tablet Take 1 tablet (15 mg total) by mouth daily. 90 tablet 0     metFORMIN (GLUCOPHAGE) 500 MG tablet TAKE 1 TABLET BY MOUTH 2 TIMES PER  tablet 2     omega-3 fatty acids 1,000 mg cap Take by mouth.       VOLTAREN 1 % Gel APPLY TO UPPER EXTREMITIES, 2 GM OF GEL TO AFFECTED AREA 4 TIMES DAILY.  DO NOT APPLY MORE THAN 8 GM DAILY TO ANY ONE AFFECTED JOINT. 100 g 3     No current facility-administered medications for this visit.        Physical Exam:  [facility] [birth year]/1989   Gen: awake, alert, oriented, no distress  HEENT: nasal turbinates are unremarkable, no oropharyngeal lesions, no cervical or supraclavicular lymphadenopathy  CV: RRR, no M/G/R  Resp: CTAB, no focal crackles or wheezes  Abd: soft, nontender, no palpable organomegaly  Skin: no apparent rashes  Ext: no cyanosis, clubbing or edema  Neuro: alert, nonfocal    Labs:  IgE 260 Oct 2017  eos 7% in Oct 2017  [place] allergy panel was negative  ILD w/u neg 2014    Imaging studies:  reviewed    PFT's  Oct 2017  FEV1/FVC is 77 and is normal.  FEV1 is 84% predicted and is normal.  FVC is 83% predicted and normal.  There was no improvement in spirometry after a single inhaled dose of bronchodilator.  TLC is 99% predicted and is normal.  RV is 123% predicted and is normal.  DLCO is 84% predicted and is normal when it   is corrected for hemoglobin.     Impression:  Full Pulmonary Function Test is normal.    Adalid Moreno (Avi)  MD  [facility] [place] [facility]  Pager [phone]  Clinic [phone]

## 2021-06-24 NOTE — TELEPHONE ENCOUNTER
Who is calling:  Pharmacy  Reason for Call:  Fax received from pharmacy 4th attempt on a request. Please refill today as it is past the 72 business hours.  Date of last appointment with primary care: n/a  Okay to leave a detailed message: No

## 2021-06-24 NOTE — TELEPHONE ENCOUNTER
Wendy called with complaint of upper respiratory symptoms and now moving into her lungs. Has recently completed a round of prednisone and Antibiotic. Will give another round of prednisone 40 mg x 5 days per Dr. Nair. Instructed to call if no improvement or if symptoms get worse.

## 2021-06-25 NOTE — TELEPHONE ENCOUNTER
Refill Approved    Rx renewed per Medication Renewal Policy. Medication was last renewed on 9/18/2018.    Eleazar Beatty, [facility] Triage/Med Refill 3/16/2019     Requested Prescriptions   Pending Prescriptions Disp Refills     atorvastatin (LIPITOR) 40 MG tablet [Pharmacy Med Name: Atorvastatin Calcium Oral Tablet 40 MG] 90 tablet 0     Sig: TAKE 1 TABLET (40 MG) BY MOUTH ONCE DAILY    Statins Refill Protocol (Hmg CoA Reductase Inhibitors) Passed - 3/13/2019  7:01 AM       Passed - PCP or prescribing provider visit in past 12 months     Last office visit with prescriber/PCP: 10/17/2018 Roxy Wall MD OR same dept: 1/24/2019 Elle Tamez FNP OR same specialty: 1/24/2019 Elle Tamez FNP  Last physical: 7/2/2015 Last MTM visit: Visit date not found   Next visit within 3 mo: Visit date not found  Next physical within 3 mo: Visit date not found  Prescriber OR PCP: Roxy Wall MD  Last diagnosis associated with med order: 1. Hyperlipidemia  - atorvastatin (LIPITOR) 40 MG tablet [Pharmacy Med Name: Atorvastatin Calcium Oral Tablet 40 MG]; TAKE 1 TABLET (40 MG) BY MOUTH ONCE DAILY  Dispense: 90 tablet; Refill: 0    If protocol passes may refill for 12 months if within 3 months of last provider visit (or a total of 15 months).

## 2021-06-27 ENCOUNTER — HEALTH MAINTENANCE LETTER (OUTPATIENT)
Age: 81
End: 2021-06-27

## 2021-06-28 ENCOUNTER — COMMUNICATION - HEALTHEAST (OUTPATIENT)
Dept: FAMILY MEDICINE | Facility: CLINIC | Age: 81
End: 2021-06-28

## 2021-06-28 DIAGNOSIS — M19.049 OSTEOARTHRITIS OF HAND, UNSPECIFIED LATERALITY, UNSPECIFIED OSTEOARTHRITIS TYPE: ICD-10-CM

## 2021-06-30 ENCOUNTER — RECORDS - HEALTHEAST (OUTPATIENT)
Dept: ADMINISTRATIVE | Facility: OTHER | Age: 81
End: 2021-06-30

## 2021-06-30 LAB — RETINOPATHY: NEGATIVE

## 2021-07-01 RX ORDER — GABAPENTIN 300 MG/1
CAPSULE ORAL
Qty: 810 CAPSULE | Refills: 0 | Status: SHIPPED | OUTPATIENT
Start: 2021-07-01 | End: 2021-09-27

## 2021-07-03 NOTE — ADDENDUM NOTE
Addendum Note by Eve Campbell RN at 2/23/2018 11:10 AM     Author: Eve Campbell RN Service: -- Author Type: Registered Nurse    Filed: 2/23/2018 11:10 AM Encounter Date: 2/22/2018 Status: Signed    : Eve Campbell RN (Registered Nurse)    Addended by: EVE CAMPBELL on: 2/23/2018 11:10 AM        Modules accepted: Orders

## 2021-07-06 ENCOUNTER — RECORDS - HEALTHEAST (OUTPATIENT)
Dept: HEALTH INFORMATION MANAGEMENT | Facility: CLINIC | Age: 81
End: 2021-07-06

## 2021-07-07 NOTE — TELEPHONE ENCOUNTER
RN cannot approve Refill Request    RN can NOT refill this medication med is not covered by policy/route to provider. Last office visit: 2/11/2021 Tsering Henson MD Last Physical: 8/6/2020 Last MTM visit: Visit date not found Last visit same specialty: 2/11/2021 Tsering Henson MD.  Next visit within 3 mo: Visit date not found  Next physical within 3 mo: Visit date not found      Eula Swan, [facility] Triage/Med Refill 6/28/2021    Requested Prescriptions   Pending Prescriptions Disp Refills     gabapentin (NEURONTIN) 300 MG capsule [Pharmacy Med Name: Gabapentin Oral Capsule 300 MG] 810 capsule 0     Sig: TAKE THREE CAPSULES BY MOUTH THREE TIMES DAILY       Gabapentin/Levetiracetam/Tiagabine Refill Protocol  Passed - 6/28/2021  2:00 AM        Passed - PCP or prescribing provider visit in past 12 months or next 3 months     Last office visit with prescriber/PCP: 2/11/2021 Tsering Henson MD OR same dept: 2/11/2021 Tsering Henson MD OR same specialty: 2/11/2021 Tsering Henson MD  Last physical: 8/6/2020 Last MTM visit: Visit date not found   Next visit within 3 mo: Visit date not found  Next physical within 3 mo: Visit date not found  Prescriber OR PCP: Tsering Henson MD  Last diagnosis associated with med order: 1. Osteoarthritis of hand, unspecified laterality, unspecified osteoarthritis type  - gabapentin (NEURONTIN) 300 MG capsule [Pharmacy Med Name: Gabapentin Oral Capsule 300 MG]; TAKE THREE CAPSULES BY MOUTH THREE TIMES DAILY   Dispense: 810 capsule; Refill: 0    If protocol passes may refill for 12 months if within 3 months of last provider visit (or a total of 15 months).

## 2021-07-13 ENCOUNTER — RECORDS - HEALTHEAST (OUTPATIENT)
Dept: ADMINISTRATIVE | Facility: CLINIC | Age: 81
End: 2021-07-13

## 2021-07-21 ENCOUNTER — RECORDS - HEALTHEAST (OUTPATIENT)
Dept: ADMINISTRATIVE | Facility: CLINIC | Age: 81
End: 2021-07-21

## 2021-07-22 ENCOUNTER — RECORDS - HEALTHEAST (OUTPATIENT)
Dept: FAMILY MEDICINE | Facility: CLINIC | Age: 81
End: 2021-07-22

## 2021-07-22 DIAGNOSIS — Z12.31 OTHER SCREENING MAMMOGRAM: ICD-10-CM

## 2021-07-26 DIAGNOSIS — I10 ESSENTIAL HYPERTENSION, BENIGN: ICD-10-CM

## 2021-07-26 DIAGNOSIS — M19.041 OSTEOARTHRITIS OF BOTH HANDS, UNSPECIFIED OSTEOARTHRITIS TYPE: ICD-10-CM

## 2021-07-26 DIAGNOSIS — M19.042 OSTEOARTHRITIS OF BOTH HANDS, UNSPECIFIED OSTEOARTHRITIS TYPE: ICD-10-CM

## 2021-07-29 RX ORDER — MELOXICAM 15 MG/1
TABLET ORAL
Qty: 90 TABLET | Refills: 0 | Status: SHIPPED | OUTPATIENT
Start: 2021-07-29 | End: 2021-10-27

## 2021-07-29 RX ORDER — LOSARTAN POTASSIUM 100 MG/1
TABLET ORAL
Qty: 90 TABLET | Refills: 1 | Status: SHIPPED | OUTPATIENT
Start: 2021-07-29 | End: 2022-01-24

## 2021-07-29 NOTE — TELEPHONE ENCOUNTER
"Routing refill request to provider for review/approval because:  Age related failed    Last Written Prescription Date:  4/29/21  Last Fill Quantity: 90,  # refills: 0   Last office visit provider:  2/11/21     Requested Prescriptions   Pending Prescriptions Disp Refills     meloxicam (MOBIC) 15 MG tablet [Pharmacy Med Name: Meloxicam Oral Tablet 15 MG] 90 tablet 0     Sig: Take 1 tablet (15 mg) by mouth once daily       NSAID Medications Failed - 7/26/2021  2:01 AM        Failed - Patient is age 6-64 years        Passed - Blood pressure under 140/90 in past 12 months     BP Readings from Last 3 Encounters:   04/13/21 122/60   02/11/21 125/59   08/06/20 116/50                 Passed - Normal ALT on file in past 12 months     Recent Labs   Lab Test 02/11/21  0855   ALT 24             Passed - Normal AST on file in past 12 months     Recent Labs   Lab Test 02/11/21  0855   AST 19             Passed - Recent (12 mo) or future (30 days) visit within the authorizing provider's specialty     Patient has had an office visit with the authorizing provider or a provider within the authorizing providers department within the previous 12 mos or has a future within next 30 days. See \"Patient Info\" tab in inbasket, or \"Choose Columns\" in Meds & Orders section of the refill encounter.              Passed - Normal CBC on file in past 12 months     Recent Labs   Lab Test 02/11/21  0855   WBC 6.8   RBC 4.18   HGB 12.9   HCT 39.1                    Passed - Medication is active on med list        Passed - No active pregnancy on record        Passed - Normal serum creatinine on file in past 12 months     Recent Labs   Lab Test 02/11/21  0855   CR 1.03       Ok to refill medication if creatinine is low          Passed - No positive pregnancy test in past 12 months         Signed Prescriptions Disp Refills    losartan (COZAAR) 100 MG tablet 90 tablet 1     Sig: Take 1 tablet  by mouth daily.       Angiotensin-II Receptors Passed - " "7/26/2021  2:01 AM        Passed - Last blood pressure under 140/90 in past 12 months     BP Readings from Last 3 Encounters:   04/13/21 122/60   02/11/21 125/59   08/06/20 116/50                 Passed - Recent (12 mo) or future (30 days) visit within the authorizing provider's specialty     Patient has had an office visit with the authorizing provider or a provider within the authorizing providers department within the previous 12 mos or has a future within next 30 days. See \"Patient Info\" tab in inbasket, or \"Choose Columns\" in Meds & Orders section of the refill encounter.              Passed - Medication is active on med list        Passed - Patient is age 18 or older        Passed - No active pregnancy on record        Passed - Normal serum creatinine on file in past 12 months     Recent Labs   Lab Test 02/11/21  0855   CR 1.03       Ok to refill medication if creatinine is low          Passed - Normal serum potassium on file in past 12 months     Recent Labs   Lab Test 02/11/21  0855   POTASSIUM 3.9                    Passed - No positive pregnancy test in past 12 months             Gabriel Tomlinson RN 07/29/21 8:22 AM  "

## 2021-07-29 NOTE — TELEPHONE ENCOUNTER
"Last Written Prescription Date:  2/7/21  Last Fill Quantity: 90,  # refills: 1   Last office visit provider:  2/11/21     Requested Prescriptions   Pending Prescriptions Disp Refills     meloxicam (MOBIC) 15 MG tablet [Pharmacy Med Name: Meloxicam Oral Tablet 15 MG] 90 tablet 0     Sig: Take 1 tablet (15 mg) by mouth once daily       NSAID Medications Failed - 7/26/2021  2:01 AM        Failed - Patient is age 6-64 years        Passed - Blood pressure under 140/90 in past 12 months     BP Readings from Last 3 Encounters:   04/13/21 122/60   02/11/21 125/59   08/06/20 116/50                 Passed - Normal ALT on file in past 12 months     Recent Labs   Lab Test 02/11/21  0855   ALT 24             Passed - Normal AST on file in past 12 months     Recent Labs   Lab Test 02/11/21  0855   AST 19             Passed - Recent (12 mo) or future (30 days) visit within the authorizing provider's specialty     Patient has had an office visit with the authorizing provider or a provider within the authorizing providers department within the previous 12 mos or has a future within next 30 days. See \"Patient Info\" tab in inbasket, or \"Choose Columns\" in Meds & Orders section of the refill encounter.              Passed - Normal CBC on file in past 12 months     Recent Labs   Lab Test 02/11/21  0855   WBC 6.8   RBC 4.18   HGB 12.9   HCT 39.1                    Passed - Medication is active on med list        Passed - No active pregnancy on record        Passed - Normal serum creatinine on file in past 12 months     Recent Labs   Lab Test 02/11/21  0855   CR 1.03       Ok to refill medication if creatinine is low          Passed - No positive pregnancy test in past 12 months           losartan (COZAAR) 100 MG tablet [Pharmacy Med Name: Losartan Potassium Oral Tablet 100 MG] 90 tablet 0     Sig: Take 1 tablet  by mouth daily.       Angiotensin-II Receptors Passed - 7/26/2021  2:01 AM        Passed - Last blood pressure under " "140/90 in past 12 months     BP Readings from Last 3 Encounters:   04/13/21 122/60   02/11/21 125/59   08/06/20 116/50                 Passed - Recent (12 mo) or future (30 days) visit within the authorizing provider's specialty     Patient has had an office visit with the authorizing provider or a provider within the authorizing providers department within the previous 12 mos or has a future within next 30 days. See \"Patient Info\" tab in inbasket, or \"Choose Columns\" in Meds & Orders section of the refill encounter.              Passed - Medication is active on med list        Passed - Patient is age 18 or older        Passed - No active pregnancy on record        Passed - Normal serum creatinine on file in past 12 months     Recent Labs   Lab Test 02/11/21  0855   CR 1.03       Ok to refill medication if creatinine is low          Passed - Normal serum potassium on file in past 12 months     Recent Labs   Lab Test 02/11/21  0855   POTASSIUM 3.9                    Passed - No positive pregnancy test in past 12 months             Gabriel Tomlinson RN 07/29/21 8:19 AM  "

## 2021-08-16 ENCOUNTER — OFFICE VISIT (OUTPATIENT)
Dept: FAMILY MEDICINE | Facility: CLINIC | Age: 81
End: 2021-08-16
Payer: COMMERCIAL

## 2021-08-16 VITALS
HEART RATE: 77 BPM | RESPIRATION RATE: 16 BRPM | WEIGHT: 150.8 LBS | DIASTOLIC BLOOD PRESSURE: 58 MMHG | BODY MASS INDEX: 27.14 KG/M2 | SYSTOLIC BLOOD PRESSURE: 144 MMHG

## 2021-08-16 DIAGNOSIS — M35.00 SICCA SYNDROME (H): ICD-10-CM

## 2021-08-16 DIAGNOSIS — E78.5 HYPERLIPIDEMIA, UNSPECIFIED HYPERLIPIDEMIA TYPE: ICD-10-CM

## 2021-08-16 DIAGNOSIS — N18.1 TYPE 2 DIABETES MELLITUS WITH STAGE 1 CHRONIC KIDNEY DISEASE, WITHOUT LONG-TERM CURRENT USE OF INSULIN (H): Primary | ICD-10-CM

## 2021-08-16 DIAGNOSIS — E11.22 TYPE 2 DIABETES MELLITUS WITH STAGE 1 CHRONIC KIDNEY DISEASE, WITHOUT LONG-TERM CURRENT USE OF INSULIN (H): Primary | ICD-10-CM

## 2021-08-16 DIAGNOSIS — Z63.6 CAREGIVER BURDEN: ICD-10-CM

## 2021-08-16 DIAGNOSIS — G25.2 ACTION TREMOR: ICD-10-CM

## 2021-08-16 DIAGNOSIS — N18.31 STAGE 3A CHRONIC KIDNEY DISEASE (H): ICD-10-CM

## 2021-08-16 DIAGNOSIS — J82.83 EOSINOPHILIC ASTHMA: ICD-10-CM

## 2021-08-16 DIAGNOSIS — I10 ESSENTIAL HYPERTENSION, BENIGN: ICD-10-CM

## 2021-08-16 PROBLEM — N18.30 CHRONIC KIDNEY DISEASE, STAGE 3 (H): Status: ACTIVE | Noted: 2021-08-16

## 2021-08-16 LAB
ALBUMIN SERPL-MCNC: 4.2 G/DL (ref 3.5–5)
ALP SERPL-CCNC: 74 U/L (ref 45–120)
ALT SERPL W P-5'-P-CCNC: 24 U/L (ref 0–45)
ANION GAP SERPL CALCULATED.3IONS-SCNC: 10 MMOL/L (ref 5–18)
AST SERPL W P-5'-P-CCNC: 23 U/L (ref 0–40)
BILIRUB SERPL-MCNC: 1.4 MG/DL (ref 0–1)
BUN SERPL-MCNC: 25 MG/DL (ref 8–28)
CALCIUM SERPL-MCNC: 10.3 MG/DL (ref 8.5–10.5)
CHLORIDE BLD-SCNC: 100 MMOL/L (ref 98–107)
CHOLEST SERPL-MCNC: 194 MG/DL
CO2 SERPL-SCNC: 29 MMOL/L (ref 22–31)
CREAT SERPL-MCNC: 1.03 MG/DL (ref 0.6–1.1)
ERYTHROCYTE [DISTWIDTH] IN BLOOD BY AUTOMATED COUNT: 12.3 % (ref 10–15)
FASTING STATUS PATIENT QL REPORTED: YES
GFR SERPL CREATININE-BSD FRML MDRD: 51 ML/MIN/1.73M2
GLUCOSE BLD-MCNC: 103 MG/DL (ref 70–125)
HBA1C MFR BLD: 6.8 % (ref 0–5.6)
HCT VFR BLD AUTO: 39.4 % (ref 35–47)
HDLC SERPL-MCNC: 57 MG/DL
HGB BLD-MCNC: 13 G/DL (ref 11.7–15.7)
LDLC SERPL CALC-MCNC: ABNORMAL MG/DL
MCH RBC QN AUTO: 30.7 PG (ref 26.5–33)
MCHC RBC AUTO-ENTMCNC: 33 G/DL (ref 31.5–36.5)
MCV RBC AUTO: 93 FL (ref 78–100)
PLATELET # BLD AUTO: 236 10E3/UL (ref 150–450)
POTASSIUM BLD-SCNC: 4 MMOL/L (ref 3.5–5)
PROT SERPL-MCNC: 6.7 G/DL (ref 6–8)
RBC # BLD AUTO: 4.23 10E6/UL (ref 3.8–5.2)
SODIUM SERPL-SCNC: 139 MMOL/L (ref 136–145)
TRIGL SERPL-MCNC: 403 MG/DL
WBC # BLD AUTO: 7.3 10E3/UL (ref 4–11)

## 2021-08-16 PROCEDURE — 83036 HEMOGLOBIN GLYCOSYLATED A1C: CPT | Performed by: FAMILY MEDICINE

## 2021-08-16 PROCEDURE — 36415 COLL VENOUS BLD VENIPUNCTURE: CPT | Performed by: FAMILY MEDICINE

## 2021-08-16 PROCEDURE — 80061 LIPID PANEL: CPT | Performed by: FAMILY MEDICINE

## 2021-08-16 PROCEDURE — 80053 COMPREHEN METABOLIC PANEL: CPT | Performed by: FAMILY MEDICINE

## 2021-08-16 PROCEDURE — 99214 OFFICE O/P EST MOD 30 MIN: CPT | Performed by: FAMILY MEDICINE

## 2021-08-16 PROCEDURE — 85027 COMPLETE CBC AUTOMATED: CPT | Performed by: FAMILY MEDICINE

## 2021-08-16 SDOH — SOCIAL STABILITY - SOCIAL INSECURITY: DEPENDENT RELATIVE NEEDING CARE AT HOME: Z63.6

## 2021-08-16 ASSESSMENT — ASTHMA QUESTIONNAIRES
QUESTION_1 LAST FOUR WEEKS HOW MUCH OF THE TIME DID YOUR ASTHMA KEEP YOU FROM GETTING AS MUCH DONE AT WORK, SCHOOL OR AT HOME: NONE OF THE TIME
QUESTION_3 LAST FOUR WEEKS HOW OFTEN DID YOUR ASTHMA SYMPTOMS (WHEEZING, COUGHING, SHORTNESS OF BREATH, CHEST TIGHTNESS OR PAIN) WAKE YOU UP AT NIGHT OR EARLIER THAN USUAL IN THE MORNING: NOT AT ALL
ACT_TOTALSCORE: 25
QUESTION_2 LAST FOUR WEEKS HOW OFTEN HAVE YOU HAD SHORTNESS OF BREATH: NOT AT ALL
QUESTION_5 LAST FOUR WEEKS HOW WOULD YOU RATE YOUR ASTHMA CONTROL: COMPLETELY CONTROLLED
QUESTION_4 LAST FOUR WEEKS HOW OFTEN HAVE YOU USED YOUR RESCUE INHALER OR NEBULIZER MEDICATION (SUCH AS ALBUTEROL): NOT AT ALL

## 2021-08-16 NOTE — PROGRESS NOTES
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  No medication needed.  Follow-up in 6 months.  - Hemoglobin A1c  - Comprehensive metabolic panel (BMP + Alb, Alk Phos, ALT, AST, Total. Bili, TP)  - CBC with platelets  - Hemoglobin A1c  - Comprehensive metabolic panel (BMP + Alb, Alk Phos, ALT, AST, Total. Bili, TP)  - CBC with platelets    Essential hypertension, benign  Acceptable range.  Continue with losartan and hydrochlorothiazide.  - Comprehensive metabolic panel (BMP + Alb, Alk Phos, ALT, AST, Total. Bili, TP)  - Comprehensive metabolic panel (BMP + Alb, Alk Phos, ALT, AST, Total. Bili, TP)    Hyperlipidemia, unspecified hyperlipidemia type  Stable.  Continue with atorvastatin.    - Lipid panel  - Lipid panel    Eosinophilic asthma  Seeing pulmonology. In good control.     Stage 3a chronic kidney disease  Lab Today.    Sicca syndrome (H)  Saw rheumatology in 2014. No symptoms.     Action tremor  No parkinson's features. Reassured.     Caregiver burden  See HPI. Doing well so far.          BMI:   Estimated body mass index is 27.14 kg/m  as calculated from the following:    Height as of 2/11/21: 1.588 m (5' 2.5\").    Weight as of this encounter: 68.4 kg (150 lb 12.8 oz).         Return in about 6 months (around 2/16/2022) for Routine preventive, with me, in person.    Tsering Henson MD  [facility]    Kolby Nguyen is a 81 year old who presents for the following health issues   HPI   Chief Complaint   Patient presents with     Medication Update     Filling out ACT today.   She saw pulmonology 4/13/2021.  Has a history of moderate persistent asthma with evidence of significant eosinophilic airway inflammation, only using S KRISTINA as needed, she did not tolerate ICS/LABA x 2 forms before. Lives at [facility] independent living. " She's main caretaker for her  who has severe peripheral neuropathy and he drives him to [facility] for copper and iron transfusion as well as to [facility] in [place] for neuropathy treatment.  She has been helping him with his toileting issue as he has fecal incontinence.  She does have a supportive family network.  Worried about action tremor.     Review of Systems   Constitutional, HEENT, cardiovascular, pulmonary, gi and gu systems are negative, except as otherwise noted.      Objective    BP (!) 144/58 (BP Location: Left arm, Patient Position: Sitting, Cuff Size: Adult Regular)   Pulse 77   Resp 16   Wt 68.4 kg (150 lb 12.8 oz)   BMI 27.14 kg/m    Body mass index is 27.14 kg/m .  Physical Exam   GENERAL: healthy, alert and no distress  NECK: no adenopathy, no asymmetry, masses, or scars and thyroid normal to palpation  RESP: lungs clear to auscultation - no rales, rhonchi or wheezes  CV: regular rate and rhythm, normal S1 S2, no S3 or S4, no murmur, click or rub, no peripheral edema and peripheral pulses strong  ABDOMEN: soft, nontender, no hepatosplenomegaly, no masses and bowel sounds normal  MS: no gross musculoskeletal defects noted, no edema,   FEET:  MF TESTING: NL              SKIN EXAM:  NL              VASCULAR:   R DP  PULSE: +                                      L DP  PULSE: +                                      R PT  PULSE: +                                      L PT  PULSE: +    SKIN: no suspicious lesions or rashes  NEURO: Normal strength and tone, mentation intact and speech normal  PSYCH: mentation appears normal, affect normal/bright

## 2021-08-17 ASSESSMENT — ASTHMA QUESTIONNAIRES: ACT_TOTALSCORE: 25

## 2021-09-10 ENCOUNTER — ALLIED HEALTH/NURSE VISIT (OUTPATIENT)
Dept: FAMILY MEDICINE | Facility: CLINIC | Age: 81
End: 2021-09-10
Payer: COMMERCIAL

## 2021-09-10 DIAGNOSIS — I10 ESSENTIAL HYPERTENSION, BENIGN: ICD-10-CM

## 2021-09-10 DIAGNOSIS — N18.1 TYPE 2 DIABETES MELLITUS WITH STAGE 1 CHRONIC KIDNEY DISEASE, WITHOUT LONG-TERM CURRENT USE OF INSULIN (H): Primary | ICD-10-CM

## 2021-09-10 DIAGNOSIS — E11.22 TYPE 2 DIABETES MELLITUS WITH STAGE 1 CHRONIC KIDNEY DISEASE, WITHOUT LONG-TERM CURRENT USE OF INSULIN (H): Primary | ICD-10-CM

## 2021-09-10 PROCEDURE — 90662 IIV NO PRSV INCREASED AG IM: CPT | Performed by: INTERNAL MEDICINE

## 2021-09-10 PROCEDURE — 99207 PR NO CHARGE NURSE ONLY: CPT | Performed by: INTERNAL MEDICINE

## 2021-09-10 PROCEDURE — G0008 ADMIN INFLUENZA VIRUS VAC: HCPCS | Performed by: INTERNAL MEDICINE

## 2021-09-24 DIAGNOSIS — R06.2 WHEEZING: ICD-10-CM

## 2021-09-24 DIAGNOSIS — M19.049 OSTEOARTHRITIS OF HAND, UNSPECIFIED LATERALITY, UNSPECIFIED OSTEOARTHRITIS TYPE: ICD-10-CM

## 2021-09-24 RX ORDER — ALBUTEROL SULFATE 90 UG/1
AEROSOL, METERED RESPIRATORY (INHALATION)
Qty: 18 G | Refills: 11 | Status: SHIPPED | OUTPATIENT
Start: 2021-09-24 | End: 2022-10-18

## 2021-09-24 NOTE — TELEPHONE ENCOUNTER
Routing refill request to provider for review/approval because:  Drug not on the [facility] refill protocol     Last Written Prescription Date:  7/1/21  Last Fill Quantity: 810,  # refills: 0   Last office visit provider:  8/16/21     Requested Prescriptions   Pending Prescriptions Disp Refills     gabapentin (NEURONTIN) 300 MG capsule [Pharmacy Med Name: Gabapentin Oral Capsule 300 MG] 810 capsule 0     Sig: Take 3 capsules by mouth 3 times daily.       There is no refill protocol information for this order          Alfredo Gustafson RN 09/24/21 2:04 PM

## 2021-09-27 RX ORDER — GABAPENTIN 300 MG/1
CAPSULE ORAL
Qty: 810 CAPSULE | Refills: 0 | Status: SHIPPED | OUTPATIENT
Start: 2021-09-27 | End: 2021-12-30

## 2021-10-17 ENCOUNTER — HEALTH MAINTENANCE LETTER (OUTPATIENT)
Age: 81
End: 2021-10-17

## 2021-10-26 DIAGNOSIS — M19.041 OSTEOARTHRITIS OF BOTH HANDS, UNSPECIFIED OSTEOARTHRITIS TYPE: ICD-10-CM

## 2021-10-26 DIAGNOSIS — M19.042 OSTEOARTHRITIS OF BOTH HANDS, UNSPECIFIED OSTEOARTHRITIS TYPE: ICD-10-CM

## 2021-10-27 RX ORDER — MELOXICAM 15 MG/1
TABLET ORAL
Qty: 90 TABLET | Refills: 0 | Status: SHIPPED | OUTPATIENT
Start: 2021-10-27 | End: 2022-01-24

## 2021-10-27 NOTE — TELEPHONE ENCOUNTER
"Routing refill request to provider for review/approval because:  Age warning  BP out of range    Last Written Prescription Date:  7/29/21  Last Fill Quantity: 90,  # refills: 0   Last office visit provider:  8/16/21     Requested Prescriptions   Pending Prescriptions Disp Refills     meloxicam (MOBIC) 15 MG tablet [Pharmacy Med Name: Meloxicam Oral Tablet 15 MG] 90 tablet 0     Sig: Take 1 tablet (15 mg) by mouth once daily       NSAID Medications Failed - 10/26/2021  2:00 AM        Failed - Blood pressure under 140/90 in past 12 months     BP Readings from Last 3 Encounters:   08/16/21 (!) 144/58   04/13/21 122/60   02/11/21 125/59                 Failed - Patient is age 6-64 years        Passed - Normal ALT on file in past 12 months     Recent Labs   Lab Test 08/16/21  1317   ALT 24             Passed - Normal AST on file in past 12 months     Recent Labs   Lab Test 08/16/21  1317   AST 23             Passed - Recent (12 mo) or future (30 days) visit within the authorizing provider's specialty     Patient has had an office visit with the authorizing provider or a provider within the authorizing providers department within the previous 12 mos or has a future within next 30 days. See \"Patient Info\" tab in inbasket, or \"Choose Columns\" in Meds & Orders section of the refill encounter.              Passed - Normal CBC on file in past 12 months     Recent Labs   Lab Test 08/16/21  1317   WBC 7.3   RBC 4.23   HGB 13.0   HCT 39.4                    Passed - Medication is active on med list        Passed - No active pregnancy on record        Passed - Normal serum creatinine on file in past 12 months     Recent Labs   Lab Test 08/16/21  1317   CR 1.03       Ok to refill medication if creatinine is low          Passed - No positive pregnancy test in past 12 months             Thuy Espana RN 10/27/21 1:42 PM  "

## 2021-12-27 DIAGNOSIS — M19.049 OSTEOARTHRITIS OF HAND, UNSPECIFIED LATERALITY, UNSPECIFIED OSTEOARTHRITIS TYPE: ICD-10-CM

## 2021-12-30 DIAGNOSIS — M19.049 OSTEOARTHRITIS OF HAND, UNSPECIFIED LATERALITY, UNSPECIFIED OSTEOARTHRITIS TYPE: ICD-10-CM

## 2021-12-30 RX ORDER — GABAPENTIN 300 MG/1
CAPSULE ORAL
Qty: 810 CAPSULE | Refills: 0 | Status: SHIPPED | OUTPATIENT
Start: 2021-12-30 | End: 2021-12-31

## 2021-12-31 RX ORDER — GABAPENTIN 300 MG/1
CAPSULE ORAL
Qty: 810 CAPSULE | Refills: 0 | Status: SHIPPED | OUTPATIENT
Start: 2021-12-31 | End: 2022-04-04

## 2022-01-22 DIAGNOSIS — M19.042 OSTEOARTHRITIS OF BOTH HANDS, UNSPECIFIED OSTEOARTHRITIS TYPE: ICD-10-CM

## 2022-01-22 DIAGNOSIS — I10 ESSENTIAL HYPERTENSION, BENIGN: ICD-10-CM

## 2022-01-22 DIAGNOSIS — M19.041 OSTEOARTHRITIS OF BOTH HANDS, UNSPECIFIED OSTEOARTHRITIS TYPE: ICD-10-CM

## 2022-01-24 RX ORDER — LOSARTAN POTASSIUM 100 MG/1
TABLET ORAL
Qty: 90 TABLET | Refills: 0 | Status: SHIPPED | OUTPATIENT
Start: 2022-01-24 | End: 2022-05-29

## 2022-01-24 RX ORDER — MELOXICAM 15 MG/1
TABLET ORAL
Qty: 90 TABLET | Refills: 0 | Status: SHIPPED | OUTPATIENT
Start: 2022-01-24 | End: 2022-05-06

## 2022-01-24 NOTE — TELEPHONE ENCOUNTER
"Routing refill request to provider for review/approval because:  BP and Age warning   Mobic  Last Written Prescription Date:  7/29/21  Last Fill Quantity: 90,  # refills: 1   Cozaar  Last Written Prescription Date:  10/27/21  Last Fill Quantity: 90,  # refills: 0   Last office visit provider:  8/16/21     Requested Prescriptions   Pending Prescriptions Disp Refills     meloxicam (MOBIC) 15 MG tablet [Pharmacy Med Name: Meloxicam Oral Tablet 15 MG] 90 tablet 0     Sig: Take 1 tablet (15 mg) by mouth once daily       NSAID Medications Failed - 1/22/2022  2:01 AM        Failed - Blood pressure under 140/90 in past 12 months     BP Readings from Last 3 Encounters:   08/16/21 (!) 144/58   04/13/21 122/60   02/11/21 125/59                 Failed - Patient is age 6-64 years        Passed - Normal ALT on file in past 12 months     Recent Labs   Lab Test 08/16/21  1317   ALT 24             Passed - Normal AST on file in past 12 months     Recent Labs   Lab Test 08/16/21  1317   AST 23             Passed - Recent (12 mo) or future (30 days) visit within the authorizing provider's specialty     Patient has had an office visit with the authorizing provider or a provider within the authorizing providers department within the previous 12 mos or has a future within next 30 days. See \"Patient Info\" tab in inbasket, or \"Choose Columns\" in Meds & Orders section of the refill encounter.              Passed - Normal CBC on file in past 12 months     Recent Labs   Lab Test 08/16/21  1317   WBC 7.3   RBC 4.23   HGB 13.0   HCT 39.4                    Passed - Medication is active on med list        Passed - No active pregnancy on record        Passed - Normal serum creatinine on file in past 12 months     Recent Labs   Lab Test 08/16/21  1317   CR 1.03       Ok to refill medication if creatinine is low          Passed - No positive pregnancy test in past 12 months           losartan (COZAAR) 100 MG tablet [Pharmacy Med Name: " "Losartan Potassium Oral Tablet 100 MG] 90 tablet 0     Sig: Take 1 tablet  by mouth daily.       Angiotensin-II Receptors Failed - 1/22/2022  2:01 AM        Failed - Last blood pressure under 140/90 in past 12 months     BP Readings from Last 3 Encounters:   08/16/21 (!) 144/58   04/13/21 122/60   02/11/21 125/59                 Passed - Recent (12 mo) or future (30 days) visit within the authorizing provider's specialty     Patient has had an office visit with the authorizing provider or a provider within the authorizing providers department within the previous 12 mos or has a future within next 30 days. See \"Patient Info\" tab in inbasket, or \"Choose Columns\" in Meds & Orders section of the refill encounter.              Passed - Medication is active on med list        Passed - Patient is age 18 or older        Passed - No active pregnancy on record        Passed - Normal serum creatinine on file in past 12 months     Recent Labs   Lab Test 08/16/21  1317   CR 1.03       Ok to refill medication if creatinine is low          Passed - Normal serum potassium on file in past 12 months     Recent Labs   Lab Test 08/16/21  1317   POTASSIUM 4.0                    Passed - No positive pregnancy test in past 12 months             Jolly Díaz RN 01/24/22 6:58 AM  "

## 2022-02-21 ENCOUNTER — OFFICE VISIT (OUTPATIENT)
Dept: FAMILY MEDICINE | Facility: CLINIC | Age: 82
End: 2022-02-21
Payer: COMMERCIAL

## 2022-02-21 VITALS
BODY MASS INDEX: 26.45 KG/M2 | DIASTOLIC BLOOD PRESSURE: 54 MMHG | SYSTOLIC BLOOD PRESSURE: 116 MMHG | HEIGHT: 63 IN | WEIGHT: 149.25 LBS | HEART RATE: 82 BPM

## 2022-02-21 DIAGNOSIS — J45.30 MILD PERSISTENT ASTHMA WITHOUT COMPLICATION: ICD-10-CM

## 2022-02-21 DIAGNOSIS — N39.3 FEMALE STRESS INCONTINENCE: ICD-10-CM

## 2022-02-21 DIAGNOSIS — E11.22 TYPE 2 DIABETES MELLITUS WITH STAGE 1 CHRONIC KIDNEY DISEASE, WITHOUT LONG-TERM CURRENT USE OF INSULIN (H): ICD-10-CM

## 2022-02-21 DIAGNOSIS — I10 ESSENTIAL HYPERTENSION, BENIGN: ICD-10-CM

## 2022-02-21 DIAGNOSIS — N18.1 TYPE 2 DIABETES MELLITUS WITH STAGE 1 CHRONIC KIDNEY DISEASE, WITHOUT LONG-TERM CURRENT USE OF INSULIN (H): ICD-10-CM

## 2022-02-21 DIAGNOSIS — E78.5 HYPERLIPIDEMIA, UNSPECIFIED HYPERLIPIDEMIA TYPE: ICD-10-CM

## 2022-02-21 DIAGNOSIS — M19.042 OSTEOARTHRITIS OF BOTH HANDS, UNSPECIFIED OSTEOARTHRITIS TYPE: ICD-10-CM

## 2022-02-21 DIAGNOSIS — E55.9 VITAMIN D DEFICIENCY: ICD-10-CM

## 2022-02-21 DIAGNOSIS — N18.31 STAGE 3A CHRONIC KIDNEY DISEASE (H): ICD-10-CM

## 2022-02-21 DIAGNOSIS — J82.83 EOSINOPHILIC ASTHMA: ICD-10-CM

## 2022-02-21 DIAGNOSIS — M35.00 SICCA SYNDROME (H): ICD-10-CM

## 2022-02-21 DIAGNOSIS — M19.041 OSTEOARTHRITIS OF BOTH HANDS, UNSPECIFIED OSTEOARTHRITIS TYPE: ICD-10-CM

## 2022-02-21 DIAGNOSIS — Z00.00 ENCOUNTER FOR MEDICARE ANNUAL WELLNESS EXAM: Primary | ICD-10-CM

## 2022-02-21 LAB
ALBUMIN SERPL-MCNC: 3.9 G/DL (ref 3.5–5)
ALBUMIN UR-MCNC: NEGATIVE MG/DL
ALP SERPL-CCNC: 71 U/L (ref 45–120)
ALT SERPL W P-5'-P-CCNC: 22 U/L (ref 0–45)
ANION GAP SERPL CALCULATED.3IONS-SCNC: 12 MMOL/L (ref 5–18)
APPEARANCE UR: CLEAR
AST SERPL W P-5'-P-CCNC: 16 U/L (ref 0–40)
BACTERIA #/AREA URNS HPF: ABNORMAL /HPF
BILIRUB SERPL-MCNC: 1.3 MG/DL (ref 0–1)
BILIRUB UR QL STRIP: NEGATIVE
BUN SERPL-MCNC: 31 MG/DL (ref 8–28)
CALCIUM SERPL-MCNC: 9.6 MG/DL (ref 8.5–10.5)
CHLORIDE BLD-SCNC: 103 MMOL/L (ref 98–107)
CHOLEST SERPL-MCNC: 213 MG/DL
CO2 SERPL-SCNC: 23 MMOL/L (ref 22–31)
COLOR UR AUTO: YELLOW
CREAT SERPL-MCNC: 1.03 MG/DL (ref 0.6–1.1)
CREAT UR-MCNC: 37 MG/DL
ERYTHROCYTE [DISTWIDTH] IN BLOOD BY AUTOMATED COUNT: 12.1 % (ref 10–15)
FASTING STATUS PATIENT QL REPORTED: YES
GFR SERPL CREATININE-BSD FRML MDRD: 54 ML/MIN/1.73M2
GLUCOSE BLD-MCNC: 141 MG/DL (ref 70–125)
GLUCOSE UR STRIP-MCNC: NEGATIVE MG/DL
HBA1C MFR BLD: 7.2 % (ref 0–5.6)
HCT VFR BLD AUTO: 39.8 % (ref 35–47)
HDLC SERPL-MCNC: 56 MG/DL
HGB BLD-MCNC: 13 G/DL (ref 11.7–15.7)
HGB UR QL STRIP: ABNORMAL
KETONES UR STRIP-MCNC: NEGATIVE MG/DL
LDLC SERPL CALC-MCNC: 89 MG/DL
LEUKOCYTE ESTERASE UR QL STRIP: ABNORMAL
MCH RBC QN AUTO: 30.4 PG (ref 26.5–33)
MCHC RBC AUTO-ENTMCNC: 32.7 G/DL (ref 31.5–36.5)
MCV RBC AUTO: 93 FL (ref 78–100)
MICROALBUMIN UR-MCNC: <0.5 MG/DL (ref 0–1.99)
MICROALBUMIN/CREAT UR: NORMAL MG/G{CREAT}
NITRATE UR QL: NEGATIVE
PH UR STRIP: 7 [PH] (ref 5–8)
PLATELET # BLD AUTO: 227 10E3/UL (ref 150–450)
POTASSIUM BLD-SCNC: 4.1 MMOL/L (ref 3.5–5)
PROT SERPL-MCNC: 6.4 G/DL (ref 6–8)
PTH-INTACT SERPL-MCNC: 62 PG/ML (ref 10–86)
RBC # BLD AUTO: 4.28 10E6/UL (ref 3.8–5.2)
RBC #/AREA URNS AUTO: ABNORMAL /HPF
SODIUM SERPL-SCNC: 138 MMOL/L (ref 136–145)
SP GR UR STRIP: 1.01 (ref 1–1.03)
SQUAMOUS #/AREA URNS AUTO: ABNORMAL /LPF
TRIGL SERPL-MCNC: 339 MG/DL
UROBILINOGEN UR STRIP-ACNC: 0.2 E.U./DL
WBC # BLD AUTO: 5.3 10E3/UL (ref 4–11)
WBC #/AREA URNS AUTO: ABNORMAL /HPF

## 2022-02-21 PROCEDURE — 80053 COMPREHEN METABOLIC PANEL: CPT | Performed by: FAMILY MEDICINE

## 2022-02-21 PROCEDURE — 99397 PER PM REEVAL EST PAT 65+ YR: CPT | Performed by: FAMILY MEDICINE

## 2022-02-21 PROCEDURE — 81001 URINALYSIS AUTO W/SCOPE: CPT | Performed by: FAMILY MEDICINE

## 2022-02-21 PROCEDURE — 99214 OFFICE O/P EST MOD 30 MIN: CPT | Mod: 25 | Performed by: FAMILY MEDICINE

## 2022-02-21 PROCEDURE — 80061 LIPID PANEL: CPT | Performed by: FAMILY MEDICINE

## 2022-02-21 PROCEDURE — 82306 VITAMIN D 25 HYDROXY: CPT | Performed by: FAMILY MEDICINE

## 2022-02-21 PROCEDURE — 82043 UR ALBUMIN QUANTITATIVE: CPT | Performed by: FAMILY MEDICINE

## 2022-02-21 PROCEDURE — 83036 HEMOGLOBIN GLYCOSYLATED A1C: CPT | Performed by: FAMILY MEDICINE

## 2022-02-21 PROCEDURE — 83970 ASSAY OF PARATHORMONE: CPT | Performed by: FAMILY MEDICINE

## 2022-02-21 PROCEDURE — 36415 COLL VENOUS BLD VENIPUNCTURE: CPT | Performed by: FAMILY MEDICINE

## 2022-02-21 PROCEDURE — 85027 COMPLETE CBC AUTOMATED: CPT | Performed by: FAMILY MEDICINE

## 2022-02-21 RX ORDER — METFORMIN HCL 500 MG
500 TABLET, EXTENDED RELEASE 24 HR ORAL
Qty: 90 TABLET | Refills: 1 | Status: SHIPPED | OUTPATIENT
Start: 2022-02-21 | End: 2022-08-18

## 2022-02-21 ASSESSMENT — ASTHMA QUESTIONNAIRES
ACT_TOTALSCORE: 25
QUESTION_4 LAST FOUR WEEKS HOW OFTEN HAVE YOU USED YOUR RESCUE INHALER OR NEBULIZER MEDICATION (SUCH AS ALBUTEROL): NOT AT ALL
QUESTION_3 LAST FOUR WEEKS HOW OFTEN DID YOUR ASTHMA SYMPTOMS (WHEEZING, COUGHING, SHORTNESS OF BREATH, CHEST TIGHTNESS OR PAIN) WAKE YOU UP AT NIGHT OR EARLIER THAN USUAL IN THE MORNING: NOT AT ALL
ACT_TOTALSCORE: 25
QUESTION_1 LAST FOUR WEEKS HOW MUCH OF THE TIME DID YOUR ASTHMA KEEP YOU FROM GETTING AS MUCH DONE AT WORK, SCHOOL OR AT HOME: NONE OF THE TIME
QUESTION_2 LAST FOUR WEEKS HOW OFTEN HAVE YOU HAD SHORTNESS OF BREATH: NOT AT ALL
QUESTION_5 LAST FOUR WEEKS HOW WOULD YOU RATE YOUR ASTHMA CONTROL: COMPLETELY CONTROLLED

## 2022-02-21 ASSESSMENT — ACTIVITIES OF DAILY LIVING (ADL): CURRENT_FUNCTION: NO ASSISTANCE NEEDED

## 2022-02-21 NOTE — PROGRESS NOTES
"SUBJECTIVE:   Wendy Suárez is a 81 year old female who presents for Preventive Visit.    Patient has been advised of split billing requirements and indicates understanding: Yes  Are you in the first 12 months of your Medicare coverage?  No    Healthy Habits:     In general, how would you rate your overall health?  Excellent    Frequency of exercise:  2-3 days/week    Duration of exercise:  15-30 minutes    Do you usually eat at least 4 servings of fruit and vegetables a day, include whole grains    & fiber and avoid regularly eating high fat or \"junk\" foods?  Yes    Taking medications regularly:  Yes    Medication side effects:  None    Ability to successfully perform activities of daily living:  No assistance needed    Home Safety:  No safety concerns identified    Hearing Impairment:  No hearing concerns    In the past 6 months, have you been bothered by leaking of urine? Yes    In general, how would you rate your overall mental or emotional health?  Excellent      PHQ-2 Total Score: 0    Additional concerns today:  No    Do you feel safe in your environment? Yes    Have you ever done Advance Care Planning? (For example, a Health Directive, POLST, or a discussion with a medical provider or your loved ones about your wishes): Yes, patient states has an Advance Care Planning document and will bring a copy to the clinic.       Fall risk  Fallen 2 or more times in the past year?: No  Any fall with injury in the past year?: No    Cognitive Screening   1) Repeat 3 items (Leader, Season, Table)    2) Clock draw: NORMAL  3) 3 item recall: Recalls 3 objects  Results: 3 items recalled: COGNITIVE IMPAIRMENT LESS LIKELY    Mini-CogTM Copyright JORGE Miller. Licensed by the author for use in [facility]; reprinted with permission ([email or web address]). All rights reserved.      Do you have sleep apnea, excessive snoring or daytime drowsiness?: no    Reviewed and updated as needed this visit by clinical staff   Tobacco  " Allergies  Meds              Reviewed and updated as needed this visit by Provider                 Social History     Tobacco Use     Smoking status: Former Smoker     Packs/day: 0.00     Types: Cigarettes     Smokeless tobacco: Never Used     Tobacco comment: quit 55 years ago, social only   Substance Use Topics     Alcohol use: Yes     Alcohol/week: 0.0 - 1.0 standard drinks         Alcohol Use 2/21/2022   Prescreen: >3 drinks/day or >7 drinks/week? No       PROBLEMS TO ADD ON...  Chief Complaint   Patient presents with     Wellness Visit     check burn on left arm     Her  is in a wheelchair due to neuropathy and she helps him and is his main caregiver. It's tiring for her. She tries to get out to see her friends and goes to choir once a week. Her children are helping her out with her . sometimes she has to call 911 to come pick him up off the floor when he slips to the ground. She and her  live at [facility]. She exercises daily at the wellness center at [facility].     Current providers sharing in care for this patient include:   Patient Care Team:  Tsering Henson MD as PCP - General  Tsering Henson MD as Assigned PCP    The following health maintenance items are reviewed in Epic and correct as of today:  Health Maintenance Due   Topic Date Due     URINALYSIS  Never done     MEDICARE ANNUAL WELLNESS VISIT  08/06/2021     FALL RISK ASSESSMENT  08/06/2021     MICROALBUMIN  02/11/2022     A1C  02/16/2022     ASTHMA ACTION PLAN  02/11/2022     Lab work is in process      Mammogram Screening - Patient over age 75, has elected to discontinue screenings.  Pertinent mammograms are reviewed under the imaging tab.    Review of Systems  Constitutional, HEENT, cardiovascular, pulmonary, GI, , musculoskeletal, neuro, skin, endocrine and psych systems are negative, except as otherwise noted.    OBJECTIVE:   BP (!) 144/55 (BP Location: Left arm, Patient  "Position: Sitting, Cuff Size: Adult Regular)   Pulse 82   Ht 1.588 m (5' 2.5\")   Wt 67.7 kg (149 lb 4 oz)   Breastfeeding No   BMI 26.86 kg/m   Estimated body mass index is 26.86 kg/m  as calculated from the following:    Height as of this encounter: 1.588 m (5' 2.5\").    Weight as of this encounter: 67.7 kg (149 lb 4 oz).  Physical Exam  GENERAL APPEARANCE: healthy, alert and no distress  EYES: Eyes grossly normal to inspection, PERRL and conjunctivae and sclerae normal  HENT: grossly normal   NECK: no adenopathy, no asymmetry, masses, or scars and thyroid normal to palpation  RESP: lungs clear to auscultation - no rales, rhonchi or wheezes  CV: regular rate and rhythm, normal S1 S2, no S3 or S4, no murmur, click or rub, no peripheral edema and peripheral pulses strong  MS: no musculoskeletal defects are noted and gait is age appropriate without ataxia  SKIN: no suspicious lesions or rashes, superficial burn medial aspect left forearm near elbow.   NEURO: Normal strength and tone, sensory exam grossly normal, mentation intact and speech normal  PSYCH: mentation appears normal and affect normal/bright    Diagnostic Test Results:  Labs reviewed in Epic    ASSESSMENT / PLAN:   Wendy was seen today for wellness visit and check burn on left arm.    Diagnoses and all orders for this visit:    Encounter for Medicare annual wellness exam  Routine history and physical, updated in EMR.  Up-to-date with immunization.  Finished with colon cancer screening, breast cancer screening, cervical cancer screening.  Bone density in 2020 repeat 2023.  Follow-up in 6 months for follow-up of chronic medical condition.    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  Will restart Metformin 500 mg once a day.  Follow-up in 6 months.  No need to check blood sugar daily.  -     Albumin Random Urine Quantitative with Creat Ratio; Future  -     " HEMOGLOBIN A1C; Future  -     Comprehensive metabolic panel (BMP + Alb, Alk Phos, ALT, AST, Total. Bili, TP); Future  -     HEMOGLOBIN A1C  -     Comprehensive metabolic panel (BMP + Alb, Alk Phos, ALT, AST, Total. Bili, TP)  -     metFORMIN (GLUCOPHAGE-XR) 500 MG 24 hr tablet; Take 1 tablet (500 mg) by mouth daily (with dinner)    Hyperlipidemia, unspecified hyperlipidemia type  Stable. Lab today. On statin.   -     Lipid panel; Future  -     Lipid panel    Stage 3a chronic kidney disease (H)  Lab today to monitor.   -     UA Macro with Reflex to Micro and Culture - lab collect; Future  -     Comprehensive metabolic panel (BMP + Alb, Alk Phos, ALT, AST, Total. Bili, TP); Future  -     CBC with platelets; Future  -     Parathyroid Hormone Intact; Future  -     Comprehensive metabolic panel (BMP + Alb, Alk Phos, ALT, AST, Total. Bili, TP)  -     CBC with platelets  -     Parathyroid Hormone Intact    Essential hypertension, benign    Sicca syndrome (H)  Used to see rheumatology in 2014.  No concern.  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  Has prednisone on hand if has exacerbation.  On rescue inhaler.  -     Vitamin D Deficiency; Future  -     Vitamin D Deficiency    Vitamin D deficiency  -     Vitamin D Deficiency; Future  -     Vitamin D Deficiency    Osteoarthritis of both hands, unspecified osteoarthritis type  Takes gabapentin and meloxicam.  Takes amitriptyline at night for sleep.    Female stress incontinence  Mild symptoms.  Discussed that she can get biofeedback therapy if needed.      Patient has been advised of split billing requirements and indicates understanding: Yes    COUNSELING:  Reviewed preventive health counseling, as reflected in patient instructions       Regular exercise       Healthy diet/nutrition    Estimated body mass index is 26.86 kg/m  as calculated from the following:     "Height as of this encounter: 1.588 m (5' 2.5\").    Weight as of this encounter: 67.7 kg (149 lb 4 oz).        She reports that she has quit smoking. Her smoking use included cigarettes. She smoked 0.00 packs per day. She has never used smokeless tobacco.      Appropriate preventive services were discussed with this patient, including applicable screening as appropriate for cardiovascular disease, diabetes, osteopenia/osteoporosis, and glaucoma.  As appropriate for age/gender, discussed screening for colorectal cancer, prostate cancer, breast cancer, and cervical cancer. Checklist reviewing preventive services available has been given to the patient.    Reviewed patients plan of care and provided an AVS. The Basic Care Plan (routine screening as documented in Health Maintenance) for Wendy meets the Care Plan requirement. This Care Plan has been established and reviewed with the Patient.    Counseling Resources:  ATP IV Guidelines  Pooled Cohorts Equation Calculator  Breast Cancer Risk Calculator  Breast Cancer: Medication to Reduce Risk  FRAX Risk Assessment  ICSI Preventive Guidelines  Dietary Guidelines for Americans, 2010  USDA's MyPlate  ASA Prophylaxis  Lung CA Screening    Tsering Henson MD  [facility]    Identified Health Risks:  "

## 2022-02-21 NOTE — PATIENT INSTRUCTIONS
Patient Education   Personalized Prevention Plan  You are due for the preventive services outlined below.  Your care team is available to assist you in scheduling these services.  If you have already completed any of these items, please share that information with your care team to update in your medical record.  Health Maintenance Due   Topic Date Due     Urine Test  Never done     FALL RISK ASSESSMENT  08/06/2021     Kidney Microalbumin Urine Test  02/11/2022     A1C Lab  02/16/2022     Asthma Action Plan - yearly  02/11/2022       Urinary Incontinence, Female (Adult)   Urinary incontinence means loss of bladder control. This problem affects many women, especially as they get older. If you have incontinence, you may be embarrassed to ask for help. But know that this problem can be treated.   Types of Incontinence  There are different types of incontinence. Two of the main types are described here. You can have more than one type.     Stress incontinence. With this type, urine leaks when pressure (stress) is put on the bladder. This may happen when you cough, sneeze, or laugh. Stress incontinence most often occurs because the pelvic floor muscles that support the bladder and urethra are weak. This can happen after pregnancy and vaginal childbirth or a hysterectomy. It can also be due to excess body weight or hormone changes.    Urge incontinence (also called overactive bladder). With this type, a sudden urge to urinate is felt often. This may happen even though there may not be much urine in the bladder. The need to urinate often during the night is common. Urge incontinence most often occurs because of bladder spasms. This may be due to bladder irritation or infection. Damage to bladder nerves or pelvic muscles, constipation, and certain medicines can also lead to urge incontinence.  Treatment depends on the cause. Further evaluation is needed to find the type you have. This will likely include an exam and  certain tests. Based on the results, you and your healthcare provider can then plan treatment. Until a diagnosis is made, the home care tips below can help ease symptoms.   Home care    Do pelvic floor muscle exercises, if they are prescribed. The pelvic floor muscles help support the bladder and urethra. Many women find that their symptoms improve when doing special exercises that strengthen these muscles. To do the exercises, contract the muscles you would use to stop your stream of urine. But do this when you re not urinating. Hold for 10 seconds, then relax. Repeat 10 to 20 times in a row, at least 3 times a day. Your healthcare provider may give you other instructions for how to do the exercises and how often.    Keep a bladder diary. This helps track how often and how much you urinate over a set period of time. Bring this diary with you to your next visit with the provider. The information can help your provider learn more about your bladder problem.    Lose weight, if advised to by your provider. Extra weight puts pressure on the bladder. Your provider can help you create a weight-loss plan that s right for you. This may include exercising more and making certain diet changes.    Don't have foods and drinks that may irritate the bladder. These can include alcohol and caffeinated drinks.    Quit smoking. Smoking and other tobacco use can lead to a long-term (chronic) cough that strains the pelvic floor muscles. Smoking may also damage the bladder and urethra. Talk with your provider about treatments or methods you can use to quit smoking.    If drinking large amounts of fluid makes you have symptoms, you may be advised to limit your fluid intake. You may also be advised to drink most of your fluids during the day and to limit fluids at night.    If you re worried about urine leakage or accidents, you may wear absorbent pads to catch urine. Change the pads often. This helps reduce discomfort. It may also reduce  the risk of skin or bladder infections.    Follow-up care  Follow up with your healthcare provider, or as directed. It may take some to find the right treatment for your problem. But healthy lifestyle changes can be made right away. These include such things as exercising on a regular basis, eating a healthy diet, losing weight (if needed), and quitting smoking. Your treatment plan may include special therapies or medicines. Certain procedures or surgery may also be options. Talk about any questions you have with your provider.   When to seek medical advice  Call the healthcare provider right away if any of these occur:    Fever of 100.4 F (38 C) or higher, or as directed by your provider    Bladder pain or fullness    Belly swelling    Nausea or vomiting    Back pain    Weakness, dizziness, or fainting  [employer] last reviewed this educational content on 1/1/2020 2000-2021 [employer]. All rights reserved. This information is not intended as a substitute for professional medical care. Always follow your healthcare professional's instructions.          Treating Incontinence in Women: Nonsurgical Methods     The best treatment for you will depend on the type of incontinence you have. Your symptoms, age, and any underlying problems that are found also affect your treatment. Some types of incontinence may over time require surgery. But nonsurgical treatments may be effective in many cases. Nonsurgical treatments include lifestyle changes, muscle-strengthening exercises, and medicines.   Nonsurgical treatments  Treatment for stress urinary incontinence includes:     Bladder training    Lifestyle changes such as weight loss and increased activity if incontinence is due to being overweight    Medicines, if bladder training has not helped    Pelvic floor muscle exercises  Lifestyle changes    Losing weight. Excess weight puts extra pressure on the pelvic floor muscles. Exercising and eating right can help  you lose weight. This helps other treatments work better.    Making certain diet changes. Some foods may make you need to urinate more, so it may be good not to have them. These include caffeinated drinks and alcohol. Ask your healthcare provider if these or other diet changes might be helpful.    Quitting smoking. Smoking can lead to a long-term (chronic) cough that strains pelvic floor muscles. Smoking may also damage the bladder and urethra.    Pelvic floor muscle exercises  There are exercises you can do to help strengthen your pelvic floor muscles. The pelvic floor muscles act as a sling to help hold the bladder and urethra in place. These muscles also help keep the urethra closed. Weak pelvic floor muscles may allow urine to leak. To strengthen the pelvic floor muscles, do the exercises daily. In a few months, the muscles will be stronger and tighter. This can help prevent urine leakage.   [employer] last reviewed this educational content on 9/1/2019 2000-2021 [employer]. All rights reserved. This information is not intended as a substitute for professional medical care. Always follow your healthcare professional's instructions.          Treating Incontinence in Women: Special Therapies      During biofeedback, sensors send signals from your pelvic floor muscles to a computer screen.   Your healthcare provider will discuss your options for treating your urinary incontinence. These depend on the cause of your problem and any other health issues you have. Often behavioral changes are tried first, followed by various medicines. If these methods are unsuccessful, one or more of the therapies described below may be part of your treatment plan.   Biofeedback  This method is taught by a nurse or physical therapist. During the therapy, a small sensor is placed in your vagina or rectum. Another sensor is placed on your stomach. Other types of sensors are also available. These sensors read signals from  the pelvic floor muscles. When you contract or relax your muscles, these signals are shown as images on a computer screen. Using the images, you can learn to relax or contract certain muscles. This can help you strengthen and better control these muscles. And it can help you learn pelvic floor muscle exercises.   Electrical stimulation  This is a painless therapy that uses a tiny amount of electric current. It helps strengthen very weak or damaged pelvic floor muscles. The electric current is sent through the muscles of the pelvic floor and bladder. This causes the muscles to contract. In time, this helps make the muscles stronger.   Stimulator implants  This method is used to treat urge incontinence. A small device is implanted under the skin near the upper buttocks. This device gives off mild electrical signals. These block extra signals that are being sent to the bladder muscle. This helps the bladder work more normally.   [employer] last reviewed this educational content on 6/1/2019 2000-2021 [employer]. All rights reserved. This information is not intended as a substitute for professional medical care. Always follow your healthcare professional's instructions.          Treating Incontinence in Women with Medicine    Urinary incontinence is the leaking of urine from the bladder. In some cases, medicine can reduce or stop the leaking. It's mainly given for urge incontinence. Your healthcare provider will talk with you about your options. Make sure to ask what side effects to expect.   Below are some types of medicines that may help with urge incontinence.   Types of medicine    Anticholinergics or beta-3 adrenergics.  These may increase how much urine the bladder can hold. They may also help relax bladder muscles.    Estrogen. This may help improve muscle tone in the urethra and bladder.    Antibiotics. These are used to treat urinary tract infections.    Botulinum toxin. Injection of botulinum  toxin into the bladder muscle is an option when other medicines are not effective.    Tips for taking medicine    Take your medicine on time and as your healthcare provider tell you to.    Tell your healthcare provider if you have any side effects, your dosage may be adjusted if needed.    Be patient. It may take time to find the right dose for you.    Keep a list of the medicines you take. Show it to your healthcare provider and pharmacist before you buy over-the-counter medicines.    [employer] last reviewed this educational content on 9/1/2019 2000-2021 [employer]. All rights reserved. This information is not intended as a substitute for professional medical care. Always follow your healthcare professional's instructions.          Kegel Exercises  Kegel exercises are done to help strengthen the muscles in your pelvic floor. You don t need special clothing or equipment. They re easy to learn and simple to do. And if you do them right, no one can tell you re doing them, so they can be done almost anywhere. Your healthcare provider, nurse, or physical therapist can answer any questions you have and help you get started.   A weak pelvic floor  The pelvic floor muscles may weaken due to aging, pregnancy and vaginal childbirth, injury, surgery, chronic cough, or lack of exercise. If the pelvic floor is weak, your bladder and other pelvic organs may sag out of place. The urethra may also open too easily and allow urine to leak out. Kegel exercises can help you strengthen your pelvic floor muscles. Then they can better support the pelvic organs and control urine flow.     How Kegel exercises are done  Try each of the Kegel exercises described below. When you re doing them, try not to move your leg, buttock, or stomach muscles:     Contract as if you were stopping your urine stream. But do it when you re not urinating.    Tighten your rectum as if trying not to pass gas. Contract your anus, but don t move  your buttocks.    You may place a finger or 2 in the vagina and squeeze your finger with your vagina to learn which muscles to tighten.  Try to hold each Kegel for a slow count to 5. You probably won t be able to hold them for that long at first. But keep practicing. It will get easier as your pelvic floor gets stronger. Eventually, special weights that you place in your vagina may be recommended to help make your Kegels even more effective. Talk to your healthcare provider if you have trouble doing Kegel exercises.   Helpful tips  Here are some tips to follow:    Do your Kegels as often as you can. The more you do them, the faster you ll feel the results.    Pick an activity you do often as a reminder. For instance, do your Kegels every time you sit down.    Tighten your pelvic floor before you sneeze, get up from a chair, cough, laugh, or lift. This can help prevent urine, gas, or stool leakage.  [email or web address] last reviewed this educational content on 8/1/2020 2000-2021 [employer]. All rights reserved. This information is not intended as a substitute for professional medical care. Always follow your healthcare professional's instructions.

## 2022-02-22 LAB — DEPRECATED CALCIDIOL+CALCIFEROL SERPL-MC: 38 UG/L (ref 30–80)

## 2022-03-20 DIAGNOSIS — I10 ESSENTIAL HYPERTENSION, BENIGN: ICD-10-CM

## 2022-03-20 DIAGNOSIS — R51.9 NONINTRACTABLE HEADACHE, UNSPECIFIED CHRONICITY PATTERN, UNSPECIFIED HEADACHE TYPE: ICD-10-CM

## 2022-03-20 DIAGNOSIS — G47.00 INSOMNIA, UNSPECIFIED TYPE: ICD-10-CM

## 2022-03-20 DIAGNOSIS — E78.5 HYPERLIPIDEMIA, UNSPECIFIED HYPERLIPIDEMIA TYPE: ICD-10-CM

## 2022-03-21 RX ORDER — ATORVASTATIN CALCIUM 40 MG/1
TABLET, FILM COATED ORAL
Qty: 90 TABLET | Refills: 0 | Status: SHIPPED | OUTPATIENT
Start: 2022-03-21 | End: 2022-03-21

## 2022-03-21 RX ORDER — HYDROCHLOROTHIAZIDE 25 MG/1
TABLET ORAL
Qty: 90 TABLET | Refills: 0 | Status: SHIPPED | OUTPATIENT
Start: 2022-03-21 | End: 2022-03-21

## 2022-03-21 RX ORDER — ATORVASTATIN CALCIUM 40 MG/1
40 TABLET, FILM COATED ORAL DAILY
Qty: 90 TABLET | Refills: 3 | Status: SHIPPED | OUTPATIENT
Start: 2022-03-21 | End: 2023-02-17

## 2022-03-21 RX ORDER — HYDROCHLOROTHIAZIDE 25 MG/1
25 TABLET ORAL DAILY
Qty: 90 TABLET | Refills: 3 | Status: SHIPPED | OUTPATIENT
Start: 2022-03-21 | End: 2023-02-17

## 2022-03-21 NOTE — TELEPHONE ENCOUNTER
"    Last Written Prescription Date:  4/2/21  Last Fill Quantity: 90,  # refills: 3   Last office visit provider:  2/21/22    Last Written Prescription Date:  4/2/21  Last Fill Quantity: 90,  # refills: 3     Last Written Prescription Date:  4/2/21  Last Fill Quantity: 90,  # refills: 3      Requested Prescriptions   Pending Prescriptions Disp Refills     hydrochlorothiazide (HYDRODIURIL) 25 MG tablet [Pharmacy Med Name: hydroCHLOROthiazide Oral Tablet 25 MG] 90 tablet 0     Sig: TAKE 1 TABLET  BY MOUTH ONCE DAILY       Diuretics (Including Combos) Protocol Passed - 3/20/2022  2:00 AM        Passed - Blood pressure under 140/90 in past 12 months     BP Readings from Last 3 Encounters:   02/21/22 116/54   08/16/21 (!) 144/58   04/13/21 122/60                 Passed - Recent (12 mo) or future (30 days) visit within the authorizing provider's specialty     Patient has had an office visit with the authorizing provider or a provider within the authorizing providers department within the previous 12 mos or has a future within next 30 days. See \"Patient Info\" tab in inbasket, or \"Choose Columns\" in Meds & Orders section of the refill encounter.              Passed - Medication is active on med list        Passed - Patient is age 18 or older        Passed - No active pregancy on record        Passed - Normal serum creatinine on file in past 12 months     Recent Labs   Lab Test 02/21/22  0952   CR 1.03              Passed - Normal serum potassium on file in past 12 months     Recent Labs   Lab Test 02/21/22  0952   POTASSIUM 4.1                    Passed - Normal serum sodium on file in past 12 months     Recent Labs   Lab Test 02/21/22  0952                 Passed - No positive pregnancy test in past 12 months           amitriptyline (ELAVIL) 25 MG tablet [Pharmacy Med Name: Amitriptyline HCl Oral Tablet 25 MG] 90 tablet 0     Sig: Take 1 tablet by mouth nightly at bedtime       Tricyclic Agents ( Annual appt and no " "PHQ9) Passed - 3/20/2022  2:00 AM        Passed - Blood Pressure under 140/90 in past 12 mos     BP Readings from Last 3 Encounters:   02/21/22 116/54   08/16/21 (!) 144/58   04/13/21 122/60                 Passed - Recent (12 mo) or future (30 days) visit within authorizing provider's specialty     Patient has had an office visit with the authorizing provider or a provider within the authorizing providers department within the previous 12 mos or has a future within next 30 days. See \"Patient Info\" tab in inbasket, or \"Choose Columns\" in Meds & Orders section of the refill encounter.              Passed - Medication is active on med list        Passed - Patient is age 18 or older        Passed - Patient is not pregnant        Passed - No positive pregnancy test on record in past 12 mos           atorvastatin (LIPITOR) 40 MG tablet [Pharmacy Med Name: Atorvastatin Calcium Oral Tablet 40 MG] 90 tablet 0     Sig: Take 1 tablet  by mouth ONCE daily       Statins Protocol Passed - 3/20/2022  2:00 AM        Passed - LDL on file in past 12 months     Recent Labs   Lab Test 02/21/22  0952   LDL 89             Passed - No abnormal creatine kinase in past 12 months     No lab results found.             Passed - Recent (12 mo) or future (30 days) visit within the authorizing provider's specialty     Patient has had an office visit with the authorizing provider or a provider within the authorizing providers department within the previous 12 mos or has a future within next 30 days. See \"Patient Info\" tab in inbasket, or \"Choose Columns\" in Meds & Orders section of the refill encounter.              Passed - Medication is active on med list        Passed - Patient is age 18 or older        Passed - No active pregnancy on record        Passed - No positive pregnancy test in past 12 months             Leigh Mcginnis RN 03/21/22 2:46 PM  "

## 2022-03-28 DIAGNOSIS — M19.049 OSTEOARTHRITIS OF HAND, UNSPECIFIED LATERALITY, UNSPECIFIED OSTEOARTHRITIS TYPE: ICD-10-CM

## 2022-03-30 NOTE — TELEPHONE ENCOUNTER
Routing refill request to provider for review/approval because:  Drug not on the [facility] refill protocol     Last Written Prescription Date:  12/31/21  Last Fill Quantity: 810,  # refills: 0   Last office visit provider: 2/21/22      Requested Prescriptions   Pending Prescriptions Disp Refills     gabapentin (NEURONTIN) 300 MG capsule [Pharmacy Med Name: Gabapentin Oral Capsule 300 MG] 810 capsule 0     Sig: Take 3 capsules by mouth three times daily       There is no refill protocol information for this order          Leigh Mcginnis RN 03/30/22 9:06 AM

## 2022-04-04 RX ORDER — GABAPENTIN 300 MG/1
CAPSULE ORAL
Qty: 810 CAPSULE | Refills: 0 | Status: SHIPPED | OUTPATIENT
Start: 2022-04-04 | End: 2022-06-23

## 2022-04-22 DIAGNOSIS — G47.00 INSOMNIA, UNSPECIFIED TYPE: ICD-10-CM

## 2022-04-24 RX ORDER — AMITRIPTYLINE HYDROCHLORIDE 10 MG/1
TABLET ORAL
Qty: 90 TABLET | Refills: 3 | Status: SHIPPED | OUTPATIENT
Start: 2022-04-24 | End: 2022-10-18

## 2022-04-24 NOTE — TELEPHONE ENCOUNTER
"Last Written Prescription Date:  4/29/21  Last Fill Quantity: 90,  # refills: 3   Last office visit provider:  2/21/22     Requested Prescriptions   Pending Prescriptions Disp Refills     amitriptyline (ELAVIL) 10 MG tablet [Pharmacy Med Name: Amitriptyline HCl Oral Tablet 10 MG] 90 tablet 0     Sig: TAKE 1 TABLET BY MOUTH ONCE DAILY AT BEDTIME.  TAKE IN ADDITION TO 25 MG TABLET.       Tricyclic Agents ( Annual appt and no PHQ9) Passed - 4/24/2022  7:19 AM        Passed - Blood Pressure under 140/90 in past 12 mos     BP Readings from Last 3 Encounters:   02/21/22 116/54   08/16/21 (!) 144/58   04/13/21 122/60                 Passed - Recent (12 mo) or future (30 days) visit within authorizing provider's specialty     Patient has had an office visit with the authorizing provider or a provider within the authorizing providers department within the previous 12 mos or has a future within next 30 days. See \"Patient Info\" tab in inbasket, or \"Choose Columns\" in Meds & Orders section of the refill encounter.              Passed - Medication is active on med list        Passed - Patient is age 18 or older        Passed - Patient is not pregnant        Passed - No positive pregnancy test on record in past 12 mos             Gabriel Tomlinson RN 04/24/22 7:19 AM  "

## 2022-05-01 DIAGNOSIS — M19.042 OSTEOARTHRITIS OF BOTH HANDS, UNSPECIFIED OSTEOARTHRITIS TYPE: ICD-10-CM

## 2022-05-01 DIAGNOSIS — M19.041 OSTEOARTHRITIS OF BOTH HANDS, UNSPECIFIED OSTEOARTHRITIS TYPE: ICD-10-CM

## 2022-05-05 NOTE — TELEPHONE ENCOUNTER
"Routing refill request to provider for review/approval because:  Drug not on the [facility] refill protocol     Last Written Prescription Date:  1/24/22  Last Fill Quantity: 90,  # refills: 0   Last office visit provider:  2/21/22     Requested Prescriptions   Pending Prescriptions Disp Refills     meloxicam (MOBIC) 15 MG tablet [Pharmacy Med Name: Meloxicam Oral Tablet 15 MG] 90 tablet 0     Sig: Take 1 tablet (15 mg) by mouth once daily       NSAID Medications Failed - 5/1/2022 10:42 PM        Failed - Patient is age 6-64 years        Passed - Blood pressure under 140/90 in past 12 months     BP Readings from Last 3 Encounters:   02/21/22 116/54   08/16/21 (!) 144/58   04/13/21 122/60                 Passed - Normal ALT on file in past 12 months     Recent Labs   Lab Test 02/21/22  0952   ALT 22             Passed - Normal AST on file in past 12 months     Recent Labs   Lab Test 02/21/22  0952   AST 16             Passed - Recent (12 mo) or future (30 days) visit within the authorizing provider's specialty     Patient has had an office visit with the authorizing provider or a provider within the authorizing providers department within the previous 12 mos or has a future within next 30 days. See \"Patient Info\" tab in inbasket, or \"Choose Columns\" in Meds & Orders section of the refill encounter.              Passed - Normal CBC on file in past 12 months     Recent Labs   Lab Test 02/21/22  0952   WBC 5.3   RBC 4.28   HGB 13.0   HCT 39.8                    Passed - Medication is active on med list        Passed - No active pregnancy on record        Passed - Normal serum creatinine on file in past 12 months     Recent Labs   Lab Test 02/21/22  0952   CR 1.03       Ok to refill medication if creatinine is low          Passed - No positive pregnancy test in past 12 months             Rosa Ellis 05/04/22 7:30 PM    "

## 2022-05-06 RX ORDER — MELOXICAM 15 MG/1
TABLET ORAL
Qty: 90 TABLET | Refills: 0 | Status: SHIPPED | OUTPATIENT
Start: 2022-05-06 | End: 2022-08-03

## 2022-05-28 DIAGNOSIS — I10 ESSENTIAL HYPERTENSION, BENIGN: ICD-10-CM

## 2022-05-29 RX ORDER — LOSARTAN POTASSIUM 100 MG/1
TABLET ORAL
Qty: 90 TABLET | Refills: 2 | Status: SHIPPED | OUTPATIENT
Start: 2022-05-29 | End: 2023-02-11

## 2022-05-29 NOTE — TELEPHONE ENCOUNTER
"Last Written Prescription Date:  1/24/22  Last Fill Quantity: 90,  # refills: 0   Last office visit provider:  2/21/22     Requested Prescriptions   Pending Prescriptions Disp Refills     losartan (COZAAR) 100 MG tablet [Pharmacy Med Name: Losartan Potassium Oral Tablet 100 MG] 90 tablet 0     Sig: Take 1 tablet  by mouth daily.       Angiotensin-II Receptors Passed - 5/28/2022  8:53 AM        Passed - Last blood pressure under 140/90 in past 12 months     BP Readings from Last 3 Encounters:   02/21/22 116/54   08/16/21 (!) 144/58   04/13/21 122/60                 Passed - Recent (12 mo) or future (30 days) visit within the authorizing provider's specialty     Patient has had an office visit with the authorizing provider or a provider within the authorizing providers department within the previous 12 mos or has a future within next 30 days. See \"Patient Info\" tab in inbasket, or \"Choose Columns\" in Meds & Orders section of the refill encounter.              Passed - Medication is active on med list        Passed - Patient is age 18 or older        Passed - No active pregnancy on record        Passed - Normal serum creatinine on file in past 12 months     Recent Labs   Lab Test 02/21/22  0952   CR 1.03       Ok to refill medication if creatinine is low          Passed - Normal serum potassium on file in past 12 months     Recent Labs   Lab Test 02/21/22  0952   POTASSIUM 4.1                    Passed - No positive pregnancy test in past 12 months             Erin Talavera RN 05/29/22 6:47 PM  "

## 2022-06-23 DIAGNOSIS — M19.049 OSTEOARTHRITIS OF HAND, UNSPECIFIED LATERALITY, UNSPECIFIED OSTEOARTHRITIS TYPE: ICD-10-CM

## 2022-06-23 RX ORDER — GABAPENTIN 300 MG/1
CAPSULE ORAL
Qty: 810 CAPSULE | Refills: 0 | Status: SHIPPED | OUTPATIENT
Start: 2022-06-23 | End: 2022-09-17

## 2022-06-23 NOTE — TELEPHONE ENCOUNTER
Routing refill request to provider for review/approval because:  Drug not on the [facility] [place]       Last Written Prescription Date: 04/04/22  Last Fill Quantity: 810, # refills: 0  Last office visit provider: Dr. Henson 2/21/22        Requested Prescriptions   Pending Prescriptions Disp Refills    gabapentin (NEURONTIN) 300 MG capsule [Pharmacy Med Name: Gabapentin Oral Capsule 300 MG] 810 capsule 0     Sig: Take 3 capsules by mouth 3 times daily.        There is no refill protocol information for this order         Charity Pandya RN 06/23/22 1:33 PM

## 2022-07-06 ENCOUNTER — TRANSFERRED RECORDS (OUTPATIENT)
Dept: HEALTH INFORMATION MANAGEMENT | Facility: CLINIC | Age: 82
End: 2022-07-06

## 2022-07-06 LAB — RETINOPATHY: NEGATIVE

## 2022-08-01 DIAGNOSIS — M19.041 OSTEOARTHRITIS OF BOTH HANDS, UNSPECIFIED OSTEOARTHRITIS TYPE: ICD-10-CM

## 2022-08-01 DIAGNOSIS — M19.042 OSTEOARTHRITIS OF BOTH HANDS, UNSPECIFIED OSTEOARTHRITIS TYPE: ICD-10-CM

## 2022-08-02 NOTE — TELEPHONE ENCOUNTER
"Routing refill request to provider for review/approval because:  Patient age > 64    Last Written Prescription Date: 5/6/22  Last Fill Quantity: 90, # refills: 0  Last office visit provider:  2/21/22        Requested Prescriptions   Pending Prescriptions Disp Refills    meloxicam (MOBIC) 15 MG tablet [Pharmacy Med Name: Meloxicam Oral Tablet 15 MG] 90 tablet 0     Sig: Take 1 tablet (15 mg) by mouth once daily        NSAID Medications Failed - 8/1/2022 12:12 PM        Failed - Patient is age 6-64 years        Passed - Blood pressure under 140/90 in past 12 months       BP Readings from Last 3 Encounters:   02/21/22 116/54   08/16/21 (!) 144/58   04/13/21 122/60                 Passed - Normal ALT on file in past 12 months     Recent Labs   Lab Test 02/21/22  0952   ALT 22               Passed - Normal AST on file in past 12 months       Recent Labs   Lab Test 02/21/22  0952   AST 16             Passed - Recent (12 mo) or future (30 days) visit within the authorizing provider's specialty     Patient has had an office visit with the authorizing provider or a provider within the authorizing providers department within the previous 12 mos or has a future within next 30 days. See \"Patient Info\" tab in inbasket, or \"Choose Columns\" in Meds & Orders section of the refill encounter.              Passed - Normal CBC on file in past 12 months     Recent Labs   Lab Test 02/21/22  0952   WBC 5.3   RBC 4.28   HGB 13.0   HCT 39.8                      Passed - Medication is active on med list        Passed - No active pregnancy on record        Passed - Normal serum creatinine on file in past 12 months     Recent Labs   Lab Test 02/21/22  0952   CR 1.03       Ok to refill medication if creatinine is low          Passed - No positive pregnancy test in past 12 months                  Charity Pandya RN 08/02/22 11:16 AM    "

## 2022-08-03 RX ORDER — MELOXICAM 15 MG/1
TABLET ORAL
Qty: 90 TABLET | Refills: 0 | Status: SHIPPED | OUTPATIENT
Start: 2022-08-03 | End: 2022-11-07

## 2022-08-12 ENCOUNTER — OFFICE VISIT (OUTPATIENT)
Dept: FAMILY MEDICINE | Facility: CLINIC | Age: 82
End: 2022-08-12
Payer: COMMERCIAL

## 2022-08-12 VITALS
RESPIRATION RATE: 20 BRPM | BODY MASS INDEX: 26.28 KG/M2 | HEART RATE: 79 BPM | DIASTOLIC BLOOD PRESSURE: 53 MMHG | SYSTOLIC BLOOD PRESSURE: 122 MMHG | WEIGHT: 146 LBS

## 2022-08-12 DIAGNOSIS — L72.0 EPIDERMAL INCLUSION CYST: ICD-10-CM

## 2022-08-12 DIAGNOSIS — N18.1 TYPE 2 DIABETES MELLITUS WITH STAGE 1 CHRONIC KIDNEY DISEASE, WITHOUT LONG-TERM CURRENT USE OF INSULIN (H): Primary | ICD-10-CM

## 2022-08-12 DIAGNOSIS — I10 ESSENTIAL HYPERTENSION, BENIGN: ICD-10-CM

## 2022-08-12 DIAGNOSIS — R05.8 DRY COUGH: ICD-10-CM

## 2022-08-12 DIAGNOSIS — R13.19 ESOPHAGEAL DYSPHAGIA: ICD-10-CM

## 2022-08-12 DIAGNOSIS — N18.31 STAGE 3A CHRONIC KIDNEY DISEASE (H): ICD-10-CM

## 2022-08-12 DIAGNOSIS — E11.22 TYPE 2 DIABETES MELLITUS WITH STAGE 1 CHRONIC KIDNEY DISEASE, WITHOUT LONG-TERM CURRENT USE OF INSULIN (H): Primary | ICD-10-CM

## 2022-08-12 LAB
ANION GAP SERPL CALCULATED.3IONS-SCNC: 14 MMOL/L (ref 7–15)
BUN SERPL-MCNC: 27.4 MG/DL (ref 8–23)
CALCIUM SERPL-MCNC: 9.2 MG/DL (ref 8.8–10.2)
CHLORIDE SERPL-SCNC: 100 MMOL/L (ref 98–107)
CREAT SERPL-MCNC: 1.15 MG/DL (ref 0.51–0.95)
DEPRECATED HCO3 PLAS-SCNC: 25 MMOL/L (ref 22–29)
GFR SERPL CREATININE-BSD FRML MDRD: 47 ML/MIN/1.73M2
GLUCOSE SERPL-MCNC: 130 MG/DL (ref 70–99)
HBA1C MFR BLD: 6.9 % (ref 0–5.6)
HOLD SPECIMEN: NORMAL
POTASSIUM SERPL-SCNC: 4.2 MMOL/L (ref 3.4–5.3)
SODIUM SERPL-SCNC: 139 MMOL/L (ref 136–145)

## 2022-08-12 PROCEDURE — 99214 OFFICE O/P EST MOD 30 MIN: CPT | Performed by: FAMILY MEDICINE

## 2022-08-12 PROCEDURE — 83036 HEMOGLOBIN GLYCOSYLATED A1C: CPT | Performed by: FAMILY MEDICINE

## 2022-08-12 PROCEDURE — 80048 BASIC METABOLIC PNL TOTAL CA: CPT | Performed by: FAMILY MEDICINE

## 2022-08-12 PROCEDURE — 36415 COLL VENOUS BLD VENIPUNCTURE: CPT | Performed by: FAMILY MEDICINE

## 2022-08-12 ASSESSMENT — ASTHMA QUESTIONNAIRES
QUESTION_3 LAST FOUR WEEKS HOW OFTEN DID YOUR ASTHMA SYMPTOMS (WHEEZING, COUGHING, SHORTNESS OF BREATH, CHEST TIGHTNESS OR PAIN) WAKE YOU UP AT NIGHT OR EARLIER THAN USUAL IN THE MORNING: NOT AT ALL
QUESTION_1 LAST FOUR WEEKS HOW MUCH OF THE TIME DID YOUR ASTHMA KEEP YOU FROM GETTING AS MUCH DONE AT WORK, SCHOOL OR AT HOME: NONE OF THE TIME
ACT_TOTALSCORE: 25
QUESTION_4 LAST FOUR WEEKS HOW OFTEN HAVE YOU USED YOUR RESCUE INHALER OR NEBULIZER MEDICATION (SUCH AS ALBUTEROL): NOT AT ALL
QUESTION_5 LAST FOUR WEEKS HOW WOULD YOU RATE YOUR ASTHMA CONTROL: COMPLETELY CONTROLLED
QUESTION_2 LAST FOUR WEEKS HOW OFTEN HAVE YOU HAD SHORTNESS OF BREATH: NOT AT ALL
ACT_TOTALSCORE: 25

## 2022-08-12 NOTE — PROGRESS NOTES
Assessment & Plan     Type 2 diabetes mellitus with stage 1 chronic kidney disease, without long-term current use of insulin (H)  Improving.  A1c 6.9.  Continue with metformin 500 mg once a day.  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  - Hemoglobin A1c  - Hemoglobin A1c  - Basic metabolic panel  (Ca, Cl, CO2, Creat, Gluc, K, Na, BUN)  - Basic metabolic panel  (Ca, Cl, CO2, Creat, Gluc, K, Na, BUN)    Essential hypertension, benign  Stable.    Stage 3a chronic kidney disease (H)  Lab today  - Basic metabolic panel  (Ca, Cl, CO2, Creat, Gluc, K, Na, BUN)  - Basic metabolic panel  (Ca, Cl, CO2, Creat, Gluc, K, Na, BUN)    Epidermal inclusion cyst  On left neck. Will send to derm.   - Adult Dermatology Referral    Dry cough  Esophageal dysphagia  Will trial omeprazole.  Has seen pulmonology before for upper airway cough syndrome and has been given rescue inhaler which she does not use.  May need upper GI endoscopy at some point.  - omeprazole (PRILOSEC) 20 MG DR capsule  Dispense: 90 capsule; Refill: 0        Return in about 6 months (around 2/12/2023) for Routine preventive, with me, in person.    Tsering Henson MD  [facility]    Kolby Nguyen is a 82 year old, presenting for the following health issues:  Diabetes (Hgb A1c 7.2 on 2/21/22)      History of Present Illness       Diabetes:   She presents for follow up of diabetes.  She is checking home blood glucose a few times a month. She checks blood glucose before meals.  Blood glucose is never over 200 and never under 70. When her blood glucose is low, the patient is asymptomatic for confusion, blurred vision, lethargy and reports not feeling dizzy, shaky, or weak.  She has no concerns regarding her diabetes at this time.  She is having numbness in feet.         Hypertension: She presents for follow up of hypertension.  She does check blood pressure  regularly outside of the clinic. Outpatient blood  pressures have not been over 140/90. She does not follow a low salt diet.       Still caring for her  who is wheelchair bound.  L[facility] at [facility]. She makes sure to take care of herself.   She has a dry cough. Has choking episodes twice in the last 6 months.       Review of Systems   Constitutional, HEENT, cardiovascular, pulmonary, gi and gu systems are negative, except as otherwise noted.      Objective    /53 (BP Location: Left arm, Patient Position: Sitting, Cuff Size: Adult Regular)   Pulse 79   Resp 20   Wt 66.2 kg (146 lb)   BMI 26.28 kg/m    Body mass index is 26.28 kg/m .  Physical Exam   GENERAL: healthy, alert and no distress  NECK: no adenopathy, no asymmetry, masses, or scars and thyroid normal to palpation  RESP: lungs clear to auscultation - no rales, rhonchi or wheezes  CV: regular rate and rhythm, normal S1 S2, no S3 or S4, no murmur, click or rub, no peripheral edema and peripheral pulses strong  ABDOMEN: soft, nontender, no hepatosplenomegaly, no masses and bowel sounds normal  MS: no gross musculoskeletal defects noted, no edema  FEET:  MF TESTING: absent in all toes. Can feel some in mid foot               SKIN EXAM:  NL              VASCULAR:   R DP  PULSE: +                                      L DP  PULSE: +                                      R PT  PULSE: +                                      L PT  PULSE: +  SKIN: 1 cm in diameter epidermal inclusion cyst left neck not infected.  NEURO: Normal strength and tone, mentation intact and speech normal  PSYCH: mentation appears normal, affect normal/bright                    .  ..

## 2022-08-18 DIAGNOSIS — N18.1 TYPE 2 DIABETES MELLITUS WITH STAGE 1 CHRONIC KIDNEY DISEASE, WITHOUT LONG-TERM CURRENT USE OF INSULIN (H): ICD-10-CM

## 2022-08-18 DIAGNOSIS — E11.22 TYPE 2 DIABETES MELLITUS WITH STAGE 1 CHRONIC KIDNEY DISEASE, WITHOUT LONG-TERM CURRENT USE OF INSULIN (H): ICD-10-CM

## 2022-08-18 RX ORDER — METFORMIN HCL 500 MG
500 TABLET, EXTENDED RELEASE 24 HR ORAL
Qty: 90 TABLET | Refills: 0 | OUTPATIENT
Start: 2022-08-18

## 2022-08-18 RX ORDER — METFORMIN HCL 500 MG
500 TABLET, EXTENDED RELEASE 24 HR ORAL
Qty: 90 TABLET | Refills: 1 | Status: SHIPPED | OUTPATIENT
Start: 2022-08-18 | End: 2023-02-10

## 2022-08-18 NOTE — TELEPHONE ENCOUNTER
"Last Written Prescription Date:  2/21/22  Last Fill Quantity: 90,  # refills: 1   Last office visit provider:  8/12/22     Requested Prescriptions   Pending Prescriptions Disp Refills     metFORMIN (GLUCOPHAGE XR) 500 MG 24 hr tablet [Pharmacy Med Name: metFORMIN HCl ER Oral Tablet Extended Release 24 Hour 500 MG] 90 tablet 0     Sig: Take 1 tablet (500 mg) by mouth daily (with dinner)       Biguanide Agents Passed - 8/18/2022  2:00 AM        Passed - Patient is age 10 or older        Passed - Patient has documented A1c within the specified period of time.     If HgbA1C is 8 or greater, it needs to be on file within the past 3 months.  If less than 8, must be on file within the past 6 months.     Recent Labs   Lab Test 08/12/22  0935   A1C 6.9*             Passed - Patient's CR is NOT>1.4 OR Patient's EGFR is NOT<45 within past 12 mos.     Recent Labs   Lab Test 08/12/22  0935 08/16/21  1317 02/11/21  0855   GFRESTIMATED 47*   < > 51*   GFRESTBLACK  --   --  >60    < > = values in this interval not displayed.       Recent Labs   Lab Test 08/12/22  0935   CR 1.15*             Passed - Patient does NOT have a diagnosis of CHF.        Passed - Medication is active on med list        Passed - Patient is not pregnant        Passed - Patient has not had a positive pregnancy test within the past 12 mos.         Passed - Recent (6 mo) or future (30 days) visit within the authorizing provider's specialty     Patient had office visit in the last 6 months or has a visit in the next 30 days with authorizing provider or within the authorizing provider's specialty.  See \"Patient Info\" tab in inbasket, or \"Choose Columns\" in Meds & Orders section of the refill encounter.                 Jolly Díaz RN 08/18/22 2:30 PM  "

## 2022-08-19 NOTE — TELEPHONE ENCOUNTER
"Refill denied, Recently refilled on 8/18/22    Last Written Prescription Date:  8/18/22  Last Fill Quantity: 90,  # refills: 1   Last office visit provider:  8/12/22     Requested Prescriptions   Pending Prescriptions Disp Refills     metFORMIN (GLUCOPHAGE XR) 500 MG 24 hr tablet [Pharmacy Med Name: metFORMIN HCl ER Oral Tablet Extended Release 24 Hour 500 MG] 90 tablet 0     Sig: Take 1 tablet (500 mg) by mouth daily (with dinner)       Biguanide Agents Passed - 8/18/2022  4:32 PM        Passed - Patient is age 10 or older        Passed - Patient has documented A1c within the specified period of time.     If HgbA1C is 8 or greater, it needs to be on file within the past 3 months.  If less than 8, must be on file within the past 6 months.     Recent Labs   Lab Test 08/12/22  0935   A1C 6.9*             Passed - Patient's CR is NOT>1.4 OR Patient's EGFR is NOT<45 within past 12 mos.     Recent Labs   Lab Test 08/12/22  0935 08/16/21  1317 02/11/21  0855   GFRESTIMATED 47*   < > 51*   GFRESTBLACK  --   --  >60    < > = values in this interval not displayed.       Recent Labs   Lab Test 08/12/22  0935   CR 1.15*             Passed - Patient does NOT have a diagnosis of CHF.        Passed - Medication is active on med list        Passed - Patient is not pregnant        Passed - Patient has not had a positive pregnancy test within the past 12 mos.         Passed - Recent (6 mo) or future (30 days) visit within the authorizing provider's specialty     Patient had office visit in the last 6 months or has a visit in the next 30 days with authorizing provider or within the authorizing provider's specialty.  See \"Patient Info\" tab in inbasket, or \"Choose Columns\" in Meds & Orders section of the refill encounter.                 Charo Reid RN 08/18/22 11:37 PM  "

## 2022-09-17 DIAGNOSIS — M19.049 OSTEOARTHRITIS OF HAND, UNSPECIFIED LATERALITY, UNSPECIFIED OSTEOARTHRITIS TYPE: ICD-10-CM

## 2022-09-17 RX ORDER — GABAPENTIN 300 MG/1
CAPSULE ORAL
Qty: 810 CAPSULE | Refills: 0 | Status: SHIPPED | OUTPATIENT
Start: 2022-09-17 | End: 2022-12-09

## 2022-10-01 ENCOUNTER — HEALTH MAINTENANCE LETTER (OUTPATIENT)
Age: 82
End: 2022-10-01

## 2022-10-17 ENCOUNTER — TRANSFERRED RECORDS (OUTPATIENT)
Dept: HEALTH INFORMATION MANAGEMENT | Facility: CLINIC | Age: 82
End: 2022-10-17

## 2022-10-18 ENCOUNTER — OFFICE VISIT (OUTPATIENT)
Dept: FAMILY MEDICINE | Facility: CLINIC | Age: 82
End: 2022-10-18
Payer: COMMERCIAL

## 2022-10-18 VITALS
TEMPERATURE: 99 F | HEART RATE: 80 BPM | HEIGHT: 63 IN | BODY MASS INDEX: 26.75 KG/M2 | RESPIRATION RATE: 18 BRPM | DIASTOLIC BLOOD PRESSURE: 67 MMHG | WEIGHT: 151 LBS | SYSTOLIC BLOOD PRESSURE: 134 MMHG

## 2022-10-18 DIAGNOSIS — E11.22 TYPE 2 DIABETES MELLITUS WITH STAGE 1 CHRONIC KIDNEY DISEASE, WITHOUT LONG-TERM CURRENT USE OF INSULIN (H): ICD-10-CM

## 2022-10-18 DIAGNOSIS — R05.8 DRY COUGH: ICD-10-CM

## 2022-10-18 DIAGNOSIS — N18.1 TYPE 2 DIABETES MELLITUS WITH STAGE 1 CHRONIC KIDNEY DISEASE, WITHOUT LONG-TERM CURRENT USE OF INSULIN (H): ICD-10-CM

## 2022-10-18 DIAGNOSIS — Z01.818 PREOPERATIVE EXAMINATION: Primary | ICD-10-CM

## 2022-10-18 DIAGNOSIS — I10 ESSENTIAL HYPERTENSION, BENIGN: ICD-10-CM

## 2022-10-18 DIAGNOSIS — Z01.818 PREOP GENERAL PHYSICAL EXAM: ICD-10-CM

## 2022-10-18 DIAGNOSIS — N18.31 STAGE 3A CHRONIC KIDNEY DISEASE (H): ICD-10-CM

## 2022-10-18 DIAGNOSIS — M35.00 SICCA SYNDROME (H): ICD-10-CM

## 2022-10-18 DIAGNOSIS — S86.011D RUPTURE OF RIGHT ACHILLES TENDON, SUBSEQUENT ENCOUNTER: ICD-10-CM

## 2022-10-18 LAB
ANION GAP SERPL CALCULATED.3IONS-SCNC: 11 MMOL/L (ref 7–15)
BUN SERPL-MCNC: 29.7 MG/DL (ref 8–23)
CALCIUM SERPL-MCNC: 9.6 MG/DL (ref 8.8–10.2)
CHLORIDE SERPL-SCNC: 102 MMOL/L (ref 98–107)
CREAT SERPL-MCNC: 1.1 MG/DL (ref 0.51–0.95)
DEPRECATED HCO3 PLAS-SCNC: 26 MMOL/L (ref 22–29)
GFR SERPL CREATININE-BSD FRML MDRD: 50 ML/MIN/1.73M2
GLUCOSE SERPL-MCNC: 146 MG/DL (ref 70–99)
HGB BLD-MCNC: 11.7 G/DL (ref 11.7–15.7)
POTASSIUM SERPL-SCNC: 4.6 MMOL/L (ref 3.4–5.3)
SARS-COV-2 RNA RESP QL NAA+PROBE: NEGATIVE
SODIUM SERPL-SCNC: 139 MMOL/L (ref 136–145)

## 2022-10-18 PROCEDURE — 36415 COLL VENOUS BLD VENIPUNCTURE: CPT | Performed by: FAMILY MEDICINE

## 2022-10-18 PROCEDURE — 99214 OFFICE O/P EST MOD 30 MIN: CPT | Performed by: FAMILY MEDICINE

## 2022-10-18 PROCEDURE — 80048 BASIC METABOLIC PNL TOTAL CA: CPT | Performed by: FAMILY MEDICINE

## 2022-10-18 PROCEDURE — 85018 HEMOGLOBIN: CPT | Performed by: FAMILY MEDICINE

## 2022-10-18 PROCEDURE — U0003 INFECTIOUS AGENT DETECTION BY NUCLEIC ACID (DNA OR RNA); SEVERE ACUTE RESPIRATORY SYNDROME CORONAVIRUS 2 (SARS-COV-2) (CORONAVIRUS DISEASE [COVID-19]), AMPLIFIED PROBE TECHNIQUE, MAKING USE OF HIGH THROUGHPUT TECHNOLOGIES AS DESCRIBED BY CMS-2020-01-R: HCPCS | Performed by: FAMILY MEDICINE

## 2022-10-18 PROCEDURE — U0005 INFEC AGEN DETEC AMPLI PROBE: HCPCS | Performed by: FAMILY MEDICINE

## 2022-10-18 NOTE — PATIENT INSTRUCTIONS
Rasta Nguyen luck with surgery  Do not take losartan or hydrochlorothiazide on the morning of surgery  Follow-up as recommended by orthopedics    Tucker Camarena MD    Preparing for Your Surgery  Getting started  A nurse will call you to review your health history and instructions. They will give you an arrival time based on your scheduled surgery time. Please be ready to share:    Your doctor's clinic name and phone number    Your medical, surgical and anesthesia history    A list of allergies and sensitivities    A list of medicines, including herbal treatments and over-the-counter drugs    Whether the patient has a legal guardian (ask how to send us the papers in advance)  Please tell us if you're pregnant--or if there's any chance you might be pregnant. Some surgeries may injure a fetus (unborn baby), so they require a pregnancy test. Surgeries that are safe for a fetus don't always need a test, and you can choose whether to have one.   If you have a child who's having surgery, please ask for a copy of Preparing for Your Child's Surgery.    Preparing for surgery    Within 10 to 30 days of surgery: Have a pre-op exam (sometimes called an H&P, or History and Physical). This can be done at a clinic or pre-operative center.  ? If you're having a , you may not need this exam. Talk to your care team.    At your pre-op exam, talk to your care team about all medicines you take. If you need to stop any medicines before surgery, ask when to start taking them again.  ? We do this for your safety. Many medicines can make you bleed too much during surgery. Some change how well surgery (anesthesia) drugs work.    Call your insurance company to let them know you're having surgery. (If you don't have insurance, call [phone].)    Call your clinic if there's any change in your health. This includes signs of a cold or flu (sore throat, runny nose, cough, rash, fever). It also includes a scrape or scratch near  the surgery site.    If you have questions on the day of surgery, call your hospital or surgery center.  COVID testing  You may need to be tested for COVID-19 before having surgery. If so, we will give you instructions (or click here).  Eating and drinking guidelines  For your safety: Unless your surgeon tells you otherwise, follow the guidelines below.    Eat and drink as usual until 8 hours before surgery. After that, no food or milk.    Drink clear liquids until 2 hours before surgery. These are liquids you can see through, like water, Gatorade and Propel Water. You may also have black coffee and tea (no cream or milk).    Nothing by mouth within 2 hours of surgery. This includes gum, candy and breath mints.    If you drink alcohol: Stop drinking it the night before surgery.    If your care team tells you to take medicine on the morning of surgery, it's okay to take it with a sip of water.  Preventing infection    Shower or bathe the night before and morning of your surgery. Follow the instructions your clinic gave you. (If no instructions, use regular soap.)    Don't shave or clip hair near your surgery site. We'll remove the hair if needed.    Don't smoke or vape the morning of surgery. You may chew nicotine gum up to 2 hours before surgery. A nicotine patch is okay.  ? Note: Some surgeries require you to completely quit smoking and nicotine. Check with your surgeon.    Your care team will make every effort to keep you safe from infection. We will:  ? Clean our hands often with soap and water (or an alcohol-based hand rub).  ? Clean the skin at your surgery site with a special soap that kills germs.  ? Give you a special gown to keep you warm. (Cold raises the risk of infection.)  ? Wear special hair covers, masks, gowns and gloves during surgery.  ? Give antibiotic medicine, if prescribed. Not all surgeries need antibiotics.  What to bring on the day of surgery    Photo ID and insurance card    Copy of your  health care directive, if you have one    Glasses and hearing aides (bring cases)  ? You can't wear contacts during surgery    Inhaler and eye drops, if you use them (tell us about these when you arrive)    CPAP machine or breathing device, if you use them    A few personal items, if spending the night    If you have . . .  ? A pacemaker, ICD (cardiac defibrillator) or other implant: Bring the ID card.  ? An implanted stimulator: Bring the remote control.  ? A legal guardian: Bring a copy of the certified (court-stamped) guardianship papers.  Please remove any jewelry, including body piercings. Leave jewelry and other valuables at home.  If you're going home the day of surgery    You must have a responsible adult drive you home. They should stay with you overnight as well.    If you don't have someone to stay with you, and you aren't safe to go home alone, we may keep you overnight. Insurance often won't pay for this.  After surgery  If it's hard to control your pain or you need more pain medicine, please call your surgeon's office.  Questions?   If you have any questions for your care team, list them here: _________________________________________________________________________________________________________________________________________________________________________ ____________________________________ ____________________________________ ____________________________________  For informational purposes only. Not to replace the advice of your health care provider. Copyright   2003, 2019 [facility]. All rights reserved. Clinically reviewed by Shoshana Pascual MD. [employer] [id number] - REV 07/22.

## 2022-10-18 NOTE — PROGRESS NOTES
[facility]  [address] [facility] [address]  Phone: [phone]  Fax: [phone]  Primary Provider: Tsering Henson  Pre-op Performing Provider: DIPAK PLATA      PREOPERATIVE EVALUATION:  Today's date: 10/18/2022    Wendy Suárez is a 82 year old female who presents for a preoperative evaluation.    Surgical Information:  Surgery/Procedure: Right Insertional achilles repair, gastroc recession, possible FHL  Surgery Location: [facility]  Surgeon: Dr. Gooden  Surgery Date: 10/21/2022  Time of Surgery: TBD   Where patient plans to recover: At home with family  Fax number for surgical facility: [phone]    Type of Anesthesia Anticipated: to be determined    Assessment & Plan     The proposed surgical procedure is considered INTERMEDIATE risk.    Preoperative examination  Rupture of right Achilles tendon, subsequent encounter    Patient is approved for surgery  Recommend holding NSAIDs and OTC supplements prior to surgery  Recommend not taking metformin, losartan, or hydrochlorothiazide on the morning of surgery  Hemoglobin and basic metabolic panel with a potassium level are pending  Follow-up as recommended by orthopedics    - Asymptomatic COVID-19 Virus (Coronavirus) by PCR    Type 2 diabetes mellitus with stage 1 chronic kidney disease, without long-term current use of insulin (H)    Her most recent hemoglobin A1c was 6.9%  She will continue metformin but will not take that on the morning of surgery  She will continue to monitor her blood sugars in the perioperative period    - Basic metabolic panel  - Hemoglobin    Stage 3a chronic kidney disease (H)    Recommend avoiding NSAIDs  Recommend increased fluids    Essential hypertension, benign    Blood pressure is mildly elevated   She will continue current treatment with losartan and hydrochlorothiazide    Dry cough    She is treated with omeprazole  She has followed up with pulmonology  regarding occasional bronchospasm  She will use an albuterol inhaler if needed    Sicca syndrome (H)    Previous evaluated by rheumatology  Recommend increased fluids when she is able           Risks and Recommendations:  The patient has the following additional risks and recommendations for perioperative complications:   - No identified additional risk factors other than previously addressed    Medication Instructions:  Patient is to take all scheduled medications on the day of surgery EXCEPT for modifications listed below:  Losartan, hydrochlorothiazide, and metformin    RECOMMENDATION:  APPROVAL GIVEN to proceed with proposed procedure, without further diagnostic evaluation.    Review of external notes as documented above       Subjective     HPI related to upcoming procedure:    This is a a pleasant 82 year old female who presents to clinic for preoperative evaluation.  She recently caught her toe on something and felt pain in the right heel and calf area.  She followed up with orthopedics and had abnormal imaging suggesting an Achilles tendon rupture.  Imaging results are not currently available.  She is currently wearing a cam walker and is a candidate for surgery including a right insertional Achilles tendon repair and possible gastrocnemius recession.  Reports ongoing tightness in her hamstring area.    Medical history is otherwise notable for type 2 diabetes mellitus that is very well controlled.  Her last hemoglobin A1c was 6.9%.  She continues to be treated with metformin.    For hypertension she is treated with losartan and hydrochlorothiazide.  She does have ongoing discomfort due to osteoarthritis and takes gabapentin.  For sleep she takes amitriptyline.    She does have an ongoing dry mouth and has followed up with rheumatology for a Sicca syndrome.  She has had follow-up with pulmonology regarding some respiratory concerns.  With infections, she can experience some bronchospasm and will require  inhalers.  Otherwise, she has a history of chronic kidney disease.    Social history is notable for the fact that she lives at [facility].  She helps cares for her  who is in a wheelchair due to a severe peripheral neuropathy.      Preop Questions 10/18/2022   1. Have you ever had a heart attack or stroke? No   2. Have you ever had surgery on your heart or blood vessels, such as a stent placement, a coronary artery bypass, or surgery on an artery in your head, neck, heart, or legs? No   3. Do you have chest pain with activity? No   4. Do you have a history of  heart failure? No   5. Do you currently have a cold, bronchitis or symptoms of other infection? No   6. Do you have a cough, shortness of breath, or wheezing? No   7. Do you or anyone in your family have previous history of blood clots? No   8. Do you or does anyone in your family have a serious bleeding problem such as prolonged bleeding following surgeries or cuts? No   9. Have you ever had problems with anemia or been told to take iron pills? No   10. Have you had any abnormal blood loss such as black, tarry or bloody stools, or abnormal vaginal bleeding? No   11. Have you ever had a blood transfusion? No   12. Are you willing to have a blood transfusion if it is medically needed before, during, or after your surgery? Yes   13. Have you or any of your relatives ever had problems with anesthesia? No   14. Do you have sleep apnea, excessive snoring or daytime drowsiness? No   15. Do you have any artifical heart valves or other implanted medical devices like a pacemaker, defibrillator, or continuous glucose monitor? No   16. Do you have artificial joints? No   17. Are you allergic to latex? No       Health Care Directive:  Patient does not have a Health Care Directive or Living Will: Patient states has Advance Directive and will bring in a copy to clinic.    Preoperative Review of :   reviewed - no record of controlled substances  prescribed.      Status of Chronic Conditions:  See problem list for active medical problems.  Problems all longstanding and stable, except as noted/documented.  See ROS for pertinent symptoms related to these conditions.      Review of Systems  Constitutional, neuro, ENT, endocrine, pulmonary, cardiac, gastrointestinal, genitourinary, musculoskeletal, integument and psychiatric systems are negative, except as otherwise noted.    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     Priority: Medium     Elevated IgE level 04/26/2018     Priority: Medium     Asthma      Priority: Medium     Upper airway cough syndrome 10/26/2017     Priority: Medium     Cough      Priority: Medium     Created by Conversion         Essential hypertension      Priority: Medium     Sicca syndrome (H) 03/08/2017     Priority: Medium     Hyperlipidemia      Priority: Medium     Created by Conversion         Insomnia, unspecified type 09/10/2016     Priority: Medium     Osteoarthritis Of The Hand      Priority: Medium     Created by Conversion  Replacement Utility updated for latest IMO load         Headache      Priority: Medium     Created by Conversion         Type II diabetes mellitus (H) 09/28/2015     Priority: Medium     Unspecified cataract 11/26/2014     Priority: Medium     Benign Essential Hypertension      Priority: Medium     Created by Conversion         Carpal Tunnel Syndrome      Priority: Medium     Created by Conversion         Menopause      Priority: Medium     Created by Conversion         Tarsal Tunnel Syndrome      Priority: Medium     Created by Conversion         Bronchospasm      Priority: Medium     Created by  Conversion         Hereditary and idiopathic peripheral neuropathy 05/19/2011     Priority: Medium     Hot flashes 09/27/2010     Priority: Medium      Past Medical History:   Diagnosis Date     Asthma     high IgE, elevated FENO 115 highest     Cough      Hypertension      Past Surgical History:   Procedure Laterality Date     HYSTERECTOMY Bilateral 1993    was not cancer, for fibroids     OOPHORECTOMY Bilateral 1993     Current Outpatient Medications   Medication Sig Dispense Refill     amitriptyline (ELAVIL) 10 MG tablet TAKE 1 TABLET BY MOUTH ONCE DAILY AT BEDTIME.  TAKE IN ADDITION TO 25 MG TABLET. 90 tablet 3     amitriptyline (ELAVIL) 25 MG tablet Take 1 tablet (25 mg) by mouth At Bedtime 90 tablet 3     atorvastatin (LIPITOR) 40 MG tablet Take 1 tablet (40 mg) by mouth daily 90 tablet 3     cholecalciferol, vitamin D3, 1,000 unit tablet [CHOLECALCIFEROL, VITAMIN D3, 1,000 UNIT TABLET] Take 1,000 Units by mouth daily.       fluticasone propionate (FLONASE) 50 mcg/actuation nasal spray [FLUTICASONE PROPIONATE (FLONASE) 50 MCG/ACTUATION NASAL SPRAY] use 2 sprays each nostril daily. 16 g 2     gabapentin (NEURONTIN) 300 MG capsule Take 3 capsules by mouth 3 times daily. 810 capsule 0     hydrochlorothiazide (HYDRODIURIL) 25 MG tablet Take 1 tablet (25 mg) by mouth daily 90 tablet 3     losartan (COZAAR) 100 MG tablet Take 1 tablet  by mouth daily. 90 tablet 2     meloxicam (MOBIC) 15 MG tablet Take 1 tablet (15 mg) by mouth once daily 90 tablet 0     metFORMIN (GLUCOPHAGE XR) 500 MG 24 hr tablet Take 1 tablet (500 mg) by mouth daily (with dinner) 90 tablet 1     omeprazole (PRILOSEC) 20 MG DR capsule Take 1 capsule (20 mg) by mouth daily 90 capsule 0     VOLTAREN 1 % Gel [VOLTAREN 1 % GEL] APPLY TO UPPER EXTREMITIES, 2 GM OF GEL TO AFFECTED AREA 4 TIMES DAILY.  DO NOT APPLY MORE THAN 8 GM DAILY TO ANY ONE AFFECTED JOINT. 100 g 3     albuterol (PROAIR HFA/PROVENTIL HFA/VENTOLIN HFA) 108 (90 Base) MCG/ACT inhaler  "[ALBUTEROL (PROAIR HFA) 90 MCG/ACTUATION INHALER] INHALE 2 PUFFS BY INHALATION ROUTE EVERY 6 HOURS AS NEEDED FOR WHEEZING (Patient not taking: Reported on 10/18/2022) 18 g 11     albuterol (PROVENTIL) 2.5 mg /3 mL (0.083 %) nebulizer solution [ALBUTEROL (PROVENTIL) 2.5 MG /3 ML (0.083 %) NEBULIZER SOLUTION] Take 3 mL (2.5 mg total) by nebulization every 6 (six) hours as needed for wheezing. (Patient not taking: Reported on 10/18/2022) 1 vial 12     blood pressure monitor Kit [BLOOD PRESSURE MONITOR KIT] Check BP couple times a week.  Goal <140/90   DX - HTN and Diabetes (Patient not taking: Reported on 10/18/2022) 1 each 0     blood-glucose meter (ACCU-CHEK LI PLUS METER) Misc [BLOOD-GLUCOSE METER (ACCU-CHEK LI PLUS METER) MISC] Check sugars 2 times daily. DX DM E11.9 (Patient not taking: Reported on 10/18/2022) 1 each 0     ONETOUCH VERIO TEST STRIPS strips [ONETOUCH VERIO TEST STRIPS STRIPS] test 2 times a day (Patient not taking: Reported on 10/18/2022) 200 strip 1     predniSONE (DELTASONE) 20 MG tablet [PREDNISONE (DELTASONE) 20 MG TABLET] Take 20 mg by mouth daily. (Patient not taking: Reported on 10/18/2022) 30 tablet 0       Allergies   Allergen Reactions     Lisinopril Unknown     cough     Sulfa (Sulfonamide Antibiotics) [Sulfa Drugs] Unknown        Social History     Tobacco Use     Smoking status: Former     Packs/day: 0.00     Types: Cigarettes     Smokeless tobacco: Never     Tobacco comments:     quit 55 years ago, social only   Substance Use Topics     Alcohol use: Yes     Alcohol/week: 0.0 - 1.0 standard drinks     Family History   Problem Relation Age of Onset     Diabetes Father      Hypertension Father      Heart Disease Paternal Grandfather      Alzheimer Disease Mother      History   Drug Use Unknown         Objective     BP (!) 155/69 (BP Location: Left arm, Patient Position: Sitting, Cuff Size: Adult Regular)   Pulse 85   Temp 99  F (37.2  C) (Oral)   Resp 18   Ht 1.588 m (5' 2.5\")  "  Wt 68.5 kg (151 lb)   BMI 27.18 kg/m      Physical Exam    GENERAL APPEARANCE: healthy, alert and no distress     EYES: EOMI, PERRL     RESP: lungs clear to auscultation - no rales, rhonchi or wheezes     CV: regular rates and rhythm, normal S1 S2, no S3 or S4 and no murmur, click or rub     MS: extremities normal- no gross deformities noted, no evidence of inflammation in joints, FROM in all extremities.     SKIN: no suspicious lesions or rashes     NEURO: Normal strength and tone, sensory exam grossly normal, mentation intact and speech normal     PSYCH: mentation appears normal. and affect normal/bright     LYMPHATICS: No cervical adenopathy    Recent Labs   Lab Test 08/12/22  0935 02/21/22  0952 08/16/21  1317   HGB  --  13.0 13.0   PLT  --  227 236    138 139   POTASSIUM 4.2 4.1 4.0   CR 1.15* 1.03 1.03   A1C 6.9* 7.2* 6.8*        Diagnostics:  No results found for this or any previous visit (from the past 240 hour(s)).   No EKG required, no history of coronary heart disease, significant arrhythmia, peripheral arterial disease or other structural heart disease.    Revised Cardiac Risk Index (RCRI):  The patient has the following serious cardiovascular risks for perioperative complications:   - No serious cardiac risks = 0 points     RCRI Interpretation: 0 points: Class I (very low risk - 0.4% complication rate)           Signed Electronically by: Tucker Camarena MD  Copy of this evaluation report is provided to requesting physician.

## 2022-10-21 ENCOUNTER — TRANSFERRED RECORDS (OUTPATIENT)
Dept: HEALTH INFORMATION MANAGEMENT | Facility: CLINIC | Age: 82
End: 2022-10-21

## 2022-10-31 DIAGNOSIS — M19.041 OSTEOARTHRITIS OF BOTH HANDS, UNSPECIFIED OSTEOARTHRITIS TYPE: ICD-10-CM

## 2022-10-31 DIAGNOSIS — M19.042 OSTEOARTHRITIS OF BOTH HANDS, UNSPECIFIED OSTEOARTHRITIS TYPE: ICD-10-CM

## 2022-11-01 NOTE — TELEPHONE ENCOUNTER
"Routing refill request to provider for review/approval because:  Labs out of range:  Creatinine  Age warning    Last Written Prescription Date:  8/3/22  Last Fill Quantity: 90,  # refills: 0   Last office visit provider:  10/18/22     Requested Prescriptions   Pending Prescriptions Disp Refills     meloxicam (MOBIC) 15 MG tablet [Pharmacy Med Name: Meloxicam Oral Tablet 15 MG] 90 tablet 0     Sig: TAKE ONE TABLET BY MOUTH ONCE DAILY       NSAID Medications Failed - 11/1/2022  8:38 AM        Failed - Patient is age 6-64 years        Failed - Normal serum creatinine on file in past 12 months     Recent Labs   Lab Test 10/18/22  1018   CR 1.10*       Ok to refill medication if creatinine is low          Passed - Blood pressure under 140/90 in past 12 months     BP Readings from Last 3 Encounters:   10/18/22 134/67   08/12/22 122/53   02/21/22 116/54                 Passed - Normal ALT on file in past 12 months     Recent Labs   Lab Test 02/21/22  0952   ALT 22             Passed - Normal AST on file in past 12 months     Recent Labs   Lab Test 02/21/22  0952   AST 16             Passed - Recent (12 mo) or future (30 days) visit within the authorizing provider's specialty     Patient has had an office visit with the authorizing provider or a provider within the authorizing providers department within the previous 12 mos or has a future within next 30 days. See \"Patient Info\" tab in inbasket, or \"Choose Columns\" in Meds & Orders section of the refill encounter.              Passed - Normal CBC on file in past 12 months     Recent Labs   Lab Test 10/18/22  1018 02/21/22  0952   WBC  --  5.3   RBC  --  4.28   HGB 11.7 13.0   HCT  --  39.8   PLT  --  227                 Passed - Medication is active on med list        Passed - No active pregnancy on record        Passed - No positive pregnancy test in past 12 months             Gabriel Tomlinson RN 11/01/22 8:38 AM  "

## 2022-11-04 ENCOUNTER — TRANSFERRED RECORDS (OUTPATIENT)
Dept: HEALTH INFORMATION MANAGEMENT | Facility: CLINIC | Age: 82
End: 2022-11-04

## 2022-11-04 DIAGNOSIS — R05.8 DRY COUGH: ICD-10-CM

## 2022-11-04 DIAGNOSIS — R13.19 ESOPHAGEAL DYSPHAGIA: ICD-10-CM

## 2022-11-07 RX ORDER — MELOXICAM 15 MG/1
TABLET ORAL
Qty: 90 TABLET | Refills: 0 | Status: SHIPPED | OUTPATIENT
Start: 2022-11-07 | End: 2022-12-09

## 2022-11-07 NOTE — TELEPHONE ENCOUNTER
"Last Written Prescription Date:  8/12/2022  Last Fill Quantity: 90,  # refills: 0   Last office visit provider:  10/18/2021     Requested Prescriptions   Pending Prescriptions Disp Refills     omeprazole (PRILOSEC) 20 MG DR capsule [Pharmacy Med Name: Omeprazole Oral Capsule Delayed Release 20 MG] 90 capsule 0     Sig: Take 1 capsule (20 mg) by mouth once daily       PPI Protocol Passed - 11/4/2022 10:24 PM        Passed - Not on Clopidogrel (unless Pantoprazole ordered)        Passed - No diagnosis of osteoporosis on record        Passed - Recent (12 mo) or future (30 days) visit within the authorizing provider's specialty     Patient has had an office visit with the authorizing provider or a provider within the authorizing providers department within the previous 12 mos or has a future within next 30 days. See \"Patient Info\" tab in inbasket, or \"Choose Columns\" in Meds & Orders section of the refill encounter.              Passed - Medication is active on med list        Passed - Patient is age 18 or older        Passed - No active pregnacy on record        Passed - No positive pregnancy test in past 12 months             Ernestine Montes De Oca RN 11/07/22 10:52 AM  "

## 2022-11-28 ENCOUNTER — TRANSFERRED RECORDS (OUTPATIENT)
Dept: HEALTH INFORMATION MANAGEMENT | Facility: CLINIC | Age: 82
End: 2022-11-28

## 2022-12-04 DIAGNOSIS — M19.049 OSTEOARTHRITIS OF HAND, UNSPECIFIED LATERALITY, UNSPECIFIED OSTEOARTHRITIS TYPE: ICD-10-CM

## 2022-12-04 DIAGNOSIS — M19.042 OSTEOARTHRITIS OF BOTH HANDS, UNSPECIFIED OSTEOARTHRITIS TYPE: ICD-10-CM

## 2022-12-04 DIAGNOSIS — M19.041 OSTEOARTHRITIS OF BOTH HANDS, UNSPECIFIED OSTEOARTHRITIS TYPE: ICD-10-CM

## 2022-12-05 NOTE — TELEPHONE ENCOUNTER
"Routing refill request to provider for review/approval because:  Drug not on the [facility] refill protocol     Last Written Prescription Date:  9/17/22  Last Fill Quantity: 810,  # refills: 0   Last office visit provider:  10/18/22     Requested Prescriptions   Pending Prescriptions Disp Refills     meloxicam (MOBIC) 15 MG tablet [Pharmacy Med Name: Meloxicam Oral Tablet 15 MG] 90 tablet 0     Sig: TAKE ONE TABLET BY MOUTH ONCE DAILY       NSAID Medications Failed - 12/4/2022  9:03 PM        Failed - Patient is age 6-64 years        Failed - Normal serum creatinine on file in past 12 months     Recent Labs   Lab Test 10/18/22  1018   CR 1.10*       Ok to refill medication if creatinine is low          Passed - Blood pressure under 140/90 in past 12 months     BP Readings from Last 3 Encounters:   10/18/22 134/67   08/12/22 122/53   02/21/22 116/54                 Passed - Normal ALT on file in past 12 months     Recent Labs   Lab Test 02/21/22  0952   ALT 22             Passed - Normal AST on file in past 12 months     Recent Labs   Lab Test 02/21/22  0952   AST 16             Passed - Recent (12 mo) or future (30 days) visit within the authorizing provider's specialty     Patient has had an office visit with the authorizing provider or a provider within the authorizing providers department within the previous 12 mos or has a future within next 30 days. See \"Patient Info\" tab in inbasket, or \"Choose Columns\" in Meds & Orders section of the refill encounter.              Passed - Normal CBC on file in past 12 months     Recent Labs   Lab Test 10/18/22  1018 02/21/22  0952   WBC  --  5.3   RBC  --  4.28   HGB 11.7 13.0   HCT  --  39.8   PLT  --  227                 Passed - Medication is active on med list        Passed - No active pregnancy on record        Passed - No positive pregnancy test in past 12 months           gabapentin (NEURONTIN) 300 MG capsule [Pharmacy Med Name: Gabapentin Oral Capsule 300 MG] 810 capsule 0 "     Sig: Take 3 capsules by mouth 3 times daily.       There is no refill protocol information for this order          Erin Talavera RN 12/05/22 4:36 PM

## 2022-12-09 RX ORDER — GABAPENTIN 300 MG/1
CAPSULE ORAL
Qty: 810 CAPSULE | Refills: 0 | Status: SHIPPED | OUTPATIENT
Start: 2022-12-09 | End: 2023-02-17

## 2022-12-09 RX ORDER — MELOXICAM 15 MG/1
TABLET ORAL
Qty: 90 TABLET | Refills: 0 | Status: SHIPPED | OUTPATIENT
Start: 2022-12-09 | End: 2023-01-27

## 2022-12-09 NOTE — TELEPHONE ENCOUNTER
Incoming call from patient checking on status of refill of gabapentin (NEURONTIN) 300 MG capsule request. Pharmacy has not heard back yet

## 2022-12-09 NOTE — TELEPHONE ENCOUNTER
"Routing refill request to provider for review/approval because:  Drug not on the [facility] [place] protocol   Labs out of range:  Creatinine    Last Written Prescription Date:  11/7/22  Last Fill Quantity: 90,  # refills: 0   Last office visit provider:  10/18/22     Last Written Prescription Date:  9/17/22  Last Fill Quantity: 810,  # refills: 0   Last office visit provider:  10/18/22    Requested Prescriptions   Pending Prescriptions Disp Refills     meloxicam (MOBIC) 15 MG tablet [Pharmacy Med Name: Meloxicam Oral Tablet 15 MG] 90 tablet 0     Sig: TAKE ONE TABLET BY MOUTH ONCE DAILY       NSAID Medications Failed - 12/9/2022 11:35 AM        Failed - Patient is age 6-64 years        Failed - Normal serum creatinine on file in past 12 months     Recent Labs   Lab Test 10/18/22  1018   CR 1.10*       Ok to refill medication if creatinine is low          Passed - Blood pressure under 140/90 in past 12 months     BP Readings from Last 3 Encounters:   10/18/22 134/67   08/12/22 122/53   02/21/22 116/54                 Passed - Normal ALT on file in past 12 months     Recent Labs   Lab Test 02/21/22  0952   ALT 22             Passed - Normal AST on file in past 12 months     Recent Labs   Lab Test 02/21/22  0952   AST 16             Passed - Recent (12 mo) or future (30 days) visit within the authorizing provider's specialty     Patient has had an office visit with the authorizing provider or a provider within the authorizing providers department within the previous 12 mos or has a future within next 30 days. See \"Patient Info\" tab in inbasket, or \"Choose Columns\" in Meds & Orders section of the refill encounter.              Passed - Normal CBC on file in past 12 months     Recent Labs   Lab Test 10/18/22  1018 02/21/22  0952   WBC  --  5.3   RBC  --  4.28   HGB 11.7 13.0   HCT  --  39.8   PLT  --  227                 Passed - Medication is active on med list        Passed - No active pregnancy on record        Passed - No " positive pregnancy test in past 12 months           gabapentin (NEURONTIN) 300 MG capsule [Pharmacy Med Name: Gabapentin Oral Capsule 300 MG] 810 capsule 0     Sig: Take 3 capsules by mouth 3 times daily.       There is no refill protocol information for this order          Gabriel Tomlinson RN 12/09/22 11:36 AM

## 2022-12-19 ENCOUNTER — TRANSFERRED RECORDS (OUTPATIENT)
Dept: HEALTH INFORMATION MANAGEMENT | Facility: CLINIC | Age: 82
End: 2022-12-19

## 2023-01-16 ENCOUNTER — OFFICE VISIT (OUTPATIENT)
Dept: PULMONOLOGY | Facility: CLINIC | Age: 83
End: 2023-01-16
Payer: COMMERCIAL

## 2023-01-16 VITALS
DIASTOLIC BLOOD PRESSURE: 72 MMHG | HEART RATE: 99 BPM | BODY MASS INDEX: 26.55 KG/M2 | SYSTOLIC BLOOD PRESSURE: 126 MMHG | WEIGHT: 147.5 LBS | OXYGEN SATURATION: 99 %

## 2023-01-16 DIAGNOSIS — J45.20 MILD INTERMITTENT ASTHMA WITHOUT COMPLICATION: Primary | ICD-10-CM

## 2023-01-16 PROCEDURE — 99213 OFFICE O/P EST LOW 20 MIN: CPT | Performed by: NURSE PRACTITIONER

## 2023-01-16 RX ORDER — ALBUTEROL SULFATE 90 UG/1
2 AEROSOL, METERED RESPIRATORY (INHALATION) EVERY 6 HOURS PRN
Qty: 18 G | Refills: 11 | Status: SHIPPED | OUTPATIENT
Start: 2023-01-16 | End: 2024-04-26

## 2023-01-16 RX ORDER — AMITRIPTYLINE HYDROCHLORIDE 10 MG/1
10 TABLET ORAL
COMMUNITY
Start: 2023-01-14 | End: 2023-02-17

## 2023-01-16 ASSESSMENT — ASTHMA QUESTIONNAIRES
ACT_TOTALSCORE: 24
ACT_TOTALSCORE: 24
QUESTION_2 LAST FOUR WEEKS HOW OFTEN HAVE YOU HAD SHORTNESS OF BREATH: NOT AT ALL
QUESTION_3 LAST FOUR WEEKS HOW OFTEN DID YOUR ASTHMA SYMPTOMS (WHEEZING, COUGHING, SHORTNESS OF BREATH, CHEST TIGHTNESS OR PAIN) WAKE YOU UP AT NIGHT OR EARLIER THAN USUAL IN THE MORNING: NOT AT ALL
QUESTION_4 LAST FOUR WEEKS HOW OFTEN HAVE YOU USED YOUR RESCUE INHALER OR NEBULIZER MEDICATION (SUCH AS ALBUTEROL): ONCE A WEEK OR LESS
QUESTION_1 LAST FOUR WEEKS HOW MUCH OF THE TIME DID YOUR ASTHMA KEEP YOU FROM GETTING AS MUCH DONE AT WORK, SCHOOL OR AT HOME: NONE OF THE TIME
QUESTION_5 LAST FOUR WEEKS HOW WOULD YOU RATE YOUR ASTHMA CONTROL: COMPLETELY CONTROLLED

## 2023-01-16 NOTE — PROGRESS NOTES
Pulmonary Clinic Follow-Up        Assessment/Plan:     Wendy Suárez is a 82 year old lady with a history of asthma, HTN, DM, OA, HLD, here for follow up.    Mild intermittent asthma  Evidence of significant eosinophilic airway inflammation (FENO as high as 115ppb at one point). Normal PFTs in 2017.  Unable to tolerate 2 forms of ICS/LABA.  Uses albuterol rarely, once every few months.  She did have one recent episode of increased cough and wheezing about 1-2 weeks ago, but feels back to baseline.   Plan:  - Continue albuterol inhaler as needed, refilled today.  - She is UTD with influenza and pneumococcal vaccinations, as well as COVID bivalent booster.  - Action plan:  Prednisone 40mg x5 days  - Follow-up yearly or PRN      Yasmine Stratton, CNP  Pulmonary Medicine  [facility]  [phone]       CC:     Asthma follow-up     HPI:     Ms. Suárez is a 82 year old lady with a history of asthma, HTN, DM, OA, HLD, here for follow up.    She reports her breathing has been almost 'perfect' since her last visit here in 4/2021.  She is not on maintenance inhalers, and only uses her albuterol once every few months.  She did have an episode of cough, wheezing about 1-2 weeks ago, but feels back to her baseline.  She did not require any medication interventions aside for her albuterol for this episode.    About 12 weeks ago she ruptured her achilles tendon while assisting her  in his WC, she has since had surgery and is doing well after PT.     ROS:     A 10-system review was obtained and was negative with the exception of the symptoms endorsed in the history of present illness.    PMH:  Past Medical History:   Diagnosis Date     Asthma     high IgE, elevated FENO 115 highest     Cough      Hypertension        PSH:  Past Surgical History:   Procedure Laterality Date     HYSTERECTOMY Bilateral 1993    was not cancer, for fibroids     OOPHORECTOMY Bilateral 1993        Allergies:  Allergies   Allergen Reactions     Lisinopril      cough     Sulfa (Sulfonamide Antibiotics)        Family HX:  Family History   Problem Relation Age of Onset     Diabetes Father      Hypertension Father      Heart disease Paternal Grandfather      Alzheimer's disease Mother        Social Hx:  Social History     Socioeconomic History     Marital status:      Spouse name: Not on file     Number of children: Not on file     Years of education: Not on file     Highest education level: Not on file   Occupational History     Not on file   Social Needs     Financial resource strain: Not on file     Food insecurity     Worry: Not on file     Inability: Not on file     Transportation needs     Medical: Not on file     Non-medical: Not on file   Tobacco Use     Smoking status: Former Smoker     Packs/day: 0.00     Types: Cigarettes     Smokeless tobacco: Never Used     Tobacco comment: quit 55 years ago, social only   Substance and Sexual Activity     Alcohol use: Yes     Alcohol/week: 0.0 - 1.0 standard drinks     Drug use: Never     Sexual activity: Not Currently   Lifestyle     Physical activity     Days per week: Not on file     Minutes per session: Not on file     Stress: Not on file   Relationships     Social connections     Talks on phone: Not on file     Gets together: Not on file     Attends [employer] service: Not on file     Active member of club or organization: Not on file     Attends meetings of clubs or organizations: Not on file     Relationship status: Not on file     Intimate partner violence     Fear of current or ex partner: Not on file     Emotionally abused: Not on file     Physically abused: Not on file     Forced sexual activity: Not on file   Other Topics Concern     Not on file   Social History Narrative    Lives at [facility]. Does strength training 3 times a week.     Her  has been dealing with medical issues and is now in a wheelchair.         She has 3 kids.  She drives her on her.         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        Current Meds:  Current Outpatient Medications   Medication Sig Dispense Refill     albuterol (PROAIR HFA) 90 mcg/actuation inhaler INHALE 2 PUFFS BY INHALATION ROUTE EVERY 6 HOURS AS NEEDED FOR WHEEZING 1 Inhaler 11     albuterol (PROVENTIL) 2.5 mg /3 mL (0.083 %) nebulizer solution Take 3 mL (2.5 mg total) by nebulization every 6 (six) hours as needed for wheezing. 1 vial 12     amitriptyline (ELAVIL) 10 MG tablet TAKE 1 TABLET BY MOUTH ONCE DAILY AT BEDTIME.  TAKE IN ADDITION TO 25 MG TABLET. 90 tablet 1     amitriptyline (ELAVIL) 25 MG tablet Take 1 tablet (25 mg) by mouth nightly at bedtime 90 tablet 3     ascorbic acid (VITAMIN C) 1000 MG tablet Take 1,000 mg by mouth daily.       atorvastatin (LIPITOR) 40 MG tablet Take 1 tablet (40 mg) by mouth ONCE daily 90 tablet 3     blood pressure monitor Kit Check BP couple times a week.  Goal <140/90   DX - HTN and Diabetes 1 each 0     blood-glucose meter (ACCU-CHEK LI PLUS METER) Misc Check sugars 2 times daily. DX DM E11.9 1 each 0     cholecalciferol, vitamin D3, 1,000 unit tablet Take 1,000 Units by mouth daily.       fluticasone propionate (FLONASE) 50 mcg/actuation nasal spray use 2 sprays each nostril daily. 16 g 2     FOLIC ACID/MULTIVITS-MIN/LUT (CENTRUM SILVER ORAL) Take by mouth.       gabapentin (NEURONTIN) 300 MG capsule Take 900 mg 3 times a day 810 capsule 2     hydroCHLOROthiazide (HYDRODIURIL) 25 MG tablet TAKE 1 TABLET (25 MG) BY MOUTH ONCE DAILY 90 tablet 3     losartan (COZAAR) 100 MG tablet Take 1 tablet (100 mg total) by mouth daily. 90 tablet 1     meloxicam (MOBIC) 15 MG tablet Take 1 tablet (15 mg) by mouth once daily 90 tablet 1     ONETOUCH VERIO TEST STRIPS strips test 2 times a day 200 strip 1     VOLTAREN 1 % Gel APPLY TO UPPER EXTREMITIES, 2 GM OF GEL TO AFFECTED AREA 4 TIMES DAILY.  DO NOT APPLY MORE THAN 8 GM DAILY TO  ANY ONE AFFECTED JOINT. 100 g 3     azithromycin (ZITHROMAX) 250 MG tablet Take 500 mg (2 x 250 mg tablets) on day 1 followed by 250 mg (1 tablet) on days 2-5. 6 tablet 3     predniSONE (DELTASONE) 20 MG tablet Take 20 mg by mouth daily. 30 tablet 0     No current facility-administered medications for this visit.         Physical Exam:     Vitals: /72 (BP Location: Left arm)   Pulse 99   Wt 66.9 kg (147 lb 8 oz)   SpO2 99%   BMI 26.55 kg/m    BMI= Body mass index is 26.55 kg/m .    Gen: elderly female, appears in NAD  HEENT: mask in place  CV: RRR, no M/G/R  Resp: slight expiratory wheezes throughout.  Respirations even and unlabored.  On RA.  Skin: no apparent rashes on visible skin  Ext: no cyanosis, clubbing or edema  Neuro: alert, nonfocal       Data:     Labs:  IgE 260 Oct 2017  CBC NO eos Feb 2021 (previously had eos 7% in Oct 2017, Abs 700)  [place] allergy panel was negative  ILD w/u neg 2014    Imaging studies:  XR CHEST PA AND LATERAL3/8/2017 9:32 AMINDICATION: Cough and end inspiratoy wheeze on right.  Eval for any infiltrateCOMPARISON: None.FINDINGS: Lungs are mildly hyperinflated but clear. Heart size normal. Degenerative changes in the spine.This report was   electronically interpreted by: Dr. Gabriel Powers MD ON 03/08/2017 at 12:08    PFT's  Oct 2017  FEV1/FVC is 77 and is normal.  FEV1 is 84% predicted and is normal.  FVC is 83% predicted and normal.  There was no improvement in spirometry after a single inhaled dose of bronchodilator.  TLC is 99% predicted and is normal.  RV is 123% predicted and is normal.  DLCO is 84% predicted and is normal when it   is corrected for hemoglobin.     Impression:  Full Pulmonary Function Test is normal.

## 2023-01-16 NOTE — PATIENT INSTRUCTIONS
It was a pleasure to see you in clinic today.   Here is what we discussed:    Continue albuterol as needed for shortness of breathing or wheezing.  Contact us with any change in breathing and we can initiate your action plan  Follow-up as needed    Yasmine Stratton, CNP  Pulmonary Medicine  [facility]  [phone]

## 2023-01-25 ENCOUNTER — TRANSFERRED RECORDS (OUTPATIENT)
Dept: HEALTH INFORMATION MANAGEMENT | Facility: CLINIC | Age: 83
End: 2023-01-25

## 2023-01-26 DIAGNOSIS — M19.041 OSTEOARTHRITIS OF BOTH HANDS, UNSPECIFIED OSTEOARTHRITIS TYPE: ICD-10-CM

## 2023-01-26 DIAGNOSIS — M19.042 OSTEOARTHRITIS OF BOTH HANDS, UNSPECIFIED OSTEOARTHRITIS TYPE: ICD-10-CM

## 2023-01-27 RX ORDER — MELOXICAM 15 MG/1
TABLET ORAL
Qty: 90 TABLET | Refills: 0 | Status: SHIPPED | OUTPATIENT
Start: 2023-01-27 | End: 2023-02-17

## 2023-01-27 NOTE — TELEPHONE ENCOUNTER
"Routing refill request to provider for review/approval because:  Labs out of range:  Cr, age    Last Written Prescription Date:  12/9/22  Last Fill Quantity: 90,  # refills: 0   Last office visit provider:  10/18/22     Requested Prescriptions   Pending Prescriptions Disp Refills     meloxicam (MOBIC) 15 MG tablet [Pharmacy Med Name: Meloxicam Oral Tablet 15 MG] 90 tablet 0     Sig: TAKE ONE TABLET BY MOUTH ONCE DAILY       NSAID Medications Failed - 1/26/2023  9:30 PM        Failed - Patient is age 6-64 years        Failed - Normal serum creatinine on file in past 12 months     Recent Labs   Lab Test 10/18/22  1018   CR 1.10*       Ok to refill medication if creatinine is low          Passed - Blood pressure under 140/90 in past 12 months     BP Readings from Last 3 Encounters:   01/16/23 126/72   10/18/22 134/67   08/12/22 122/53                 Passed - Normal ALT on file in past 12 months     Recent Labs   Lab Test 02/21/22  0952   ALT 22             Passed - Normal AST on file in past 12 months     Recent Labs   Lab Test 02/21/22  0952   AST 16             Passed - Recent (12 mo) or future (30 days) visit within the authorizing provider's specialty     Patient has had an office visit with the authorizing provider or a provider within the authorizing providers department within the previous 12 mos or has a future within next 30 days. See \"Patient Info\" tab in inbasket, or \"Choose Columns\" in Meds & Orders section of the refill encounter.              Passed - Normal CBC on file in past 12 months     Recent Labs   Lab Test 10/18/22  1018 02/21/22  0952   WBC  --  5.3   RBC  --  4.28   HGB 11.7 13.0   HCT  --  39.8   PLT  --  227                 Passed - Medication is active on med list        Passed - No active pregnancy on record        Passed - No positive pregnancy test in past 12 months             Erin Talavera, RN 01/27/23 2:42 PM  "

## 2023-02-09 DIAGNOSIS — E11.22 TYPE 2 DIABETES MELLITUS WITH STAGE 1 CHRONIC KIDNEY DISEASE, WITHOUT LONG-TERM CURRENT USE OF INSULIN (H): ICD-10-CM

## 2023-02-09 DIAGNOSIS — N18.1 TYPE 2 DIABETES MELLITUS WITH STAGE 1 CHRONIC KIDNEY DISEASE, WITHOUT LONG-TERM CURRENT USE OF INSULIN (H): ICD-10-CM

## 2023-02-10 DIAGNOSIS — I10 ESSENTIAL HYPERTENSION, BENIGN: ICD-10-CM

## 2023-02-10 RX ORDER — METFORMIN HCL 500 MG
TABLET, EXTENDED RELEASE 24 HR ORAL
Qty: 90 TABLET | Refills: 0 | Status: SHIPPED | OUTPATIENT
Start: 2023-02-10 | End: 2023-02-17

## 2023-02-10 NOTE — TELEPHONE ENCOUNTER
"Routing refill request to provider for review/approval because:  Labs not current:  a1c    Last Written Prescription Date:  8/18/22  Last Fill Quantity: 90,  # refills: 1   Last office visit provider:  10/18/22     Requested Prescriptions   Pending Prescriptions Disp Refills     metFORMIN (GLUCOPHAGE XR) 500 MG 24 hr tablet [Pharmacy Med Name: metFORMIN HCl ER Oral Tablet Extended Release 24 Hour 500 MG] 90 tablet 0     Sig: Take 1 tablet (500 mg) by mouth daily with dinner       Biguanide Agents Failed - 2/9/2023 11:28 AM        Failed - Patient has documented A1c within the specified period of time.     If HgbA1C is 8 or greater, it needs to be on file within the past 3 months.  If less than 8, must be on file within the past 6 months.     Recent Labs   Lab Test 08/12/22  0935   A1C 6.9*             Passed - Patient is age 10 or older        Passed - Patient's CR is NOT>1.4 OR Patient's EGFR is NOT<45 within past 12 mos.     Recent Labs   Lab Test 10/18/22  1018 08/16/21  1317 02/11/21  0855   GFRESTIMATED 50*   < > 51*   GFRESTBLACK  --   --  >60    < > = values in this interval not displayed.       Recent Labs   Lab Test 10/18/22  1018   CR 1.10*             Passed - Patient does NOT have a diagnosis of CHF.        Passed - Medication is active on med list        Passed - Patient is not pregnant        Passed - Patient has not had a positive pregnancy test within the past 12 mos.         Passed - Recent (6 mo) or future (30 days) visit within the authorizing provider's specialty     Patient had office visit in the last 6 months or has a visit in the next 30 days with authorizing provider or within the authorizing provider's specialty.  See \"Patient Info\" tab in inbasket, or \"Choose Columns\" in Meds & Orders section of the refill encounter.                 Erin Talavera RN 02/10/23 12:51 PM  "

## 2023-02-11 RX ORDER — LOSARTAN POTASSIUM 100 MG/1
TABLET ORAL
Qty: 90 TABLET | Refills: 0 | Status: SHIPPED | OUTPATIENT
Start: 2023-02-11 | End: 2023-02-17

## 2023-02-11 NOTE — TELEPHONE ENCOUNTER
"Routing refill request to provider for review/approval because:  Labs out of range:  cr    Last Written Prescription Date:  5/29/22  Last Fill Quantity: 90,  # refills: 2   Last office visit provider:  10/18/22     Requested Prescriptions   Pending Prescriptions Disp Refills     losartan (COZAAR) 100 MG tablet [Pharmacy Med Name: Losartan Potassium Oral Tablet 100 MG] 90 tablet 0     Sig: Take 1 tablet by mouth once daily       Angiotensin-II Receptors Failed - 2/10/2023  2:01 AM        Failed - Normal serum creatinine on file in past 12 months     Recent Labs   Lab Test 10/18/22  1018   CR 1.10*       Ok to refill medication if creatinine is low          Passed - Last blood pressure under 140/90 in past 12 months     BP Readings from Last 3 Encounters:   01/16/23 126/72   10/18/22 134/67   08/12/22 122/53                 Passed - Recent (12 mo) or future (30 days) visit within the authorizing provider's specialty     Patient has had an office visit with the authorizing provider or a provider within the authorizing providers department within the previous 12 mos or has a future within next 30 days. See \"Patient Info\" tab in inbasket, or \"Choose Columns\" in Meds & Orders section of the refill encounter.              Passed - Medication is active on med list        Passed - Patient is age 18 or older        Passed - No active pregnancy on record        Passed - Normal serum potassium on file in past 12 months     Recent Labs   Lab Test 10/18/22  1018   POTASSIUM 4.6                    Passed - No positive pregnancy test in past 12 months             Erin Talavera RN 02/11/23 4:45 PM  "

## 2023-02-17 ENCOUNTER — OFFICE VISIT (OUTPATIENT)
Dept: FAMILY MEDICINE | Facility: CLINIC | Age: 83
End: 2023-02-17
Payer: COMMERCIAL

## 2023-02-17 VITALS
HEIGHT: 63 IN | OXYGEN SATURATION: 98 % | DIASTOLIC BLOOD PRESSURE: 55 MMHG | BODY MASS INDEX: 26.05 KG/M2 | HEART RATE: 80 BPM | RESPIRATION RATE: 20 BRPM | WEIGHT: 147 LBS | SYSTOLIC BLOOD PRESSURE: 148 MMHG

## 2023-02-17 DIAGNOSIS — E11.22 TYPE 2 DIABETES MELLITUS WITH STAGE 3A CHRONIC KIDNEY DISEASE, WITHOUT LONG-TERM CURRENT USE OF INSULIN (H): ICD-10-CM

## 2023-02-17 DIAGNOSIS — N18.31 STAGE 3A CHRONIC KIDNEY DISEASE (H): ICD-10-CM

## 2023-02-17 DIAGNOSIS — E78.5 HYPERLIPIDEMIA, UNSPECIFIED HYPERLIPIDEMIA TYPE: ICD-10-CM

## 2023-02-17 DIAGNOSIS — M25.511 ACUTE PAIN OF RIGHT SHOULDER: ICD-10-CM

## 2023-02-17 DIAGNOSIS — Z13.220 SCREENING FOR HYPERLIPIDEMIA: ICD-10-CM

## 2023-02-17 DIAGNOSIS — M19.041 OSTEOARTHRITIS OF BOTH HANDS, UNSPECIFIED OSTEOARTHRITIS TYPE: ICD-10-CM

## 2023-02-17 DIAGNOSIS — J45.30 MILD PERSISTENT ASTHMA WITHOUT COMPLICATION: ICD-10-CM

## 2023-02-17 DIAGNOSIS — G47.00 INSOMNIA, UNSPECIFIED TYPE: ICD-10-CM

## 2023-02-17 DIAGNOSIS — R51.9 NONINTRACTABLE HEADACHE, UNSPECIFIED CHRONICITY PATTERN, UNSPECIFIED HEADACHE TYPE: ICD-10-CM

## 2023-02-17 DIAGNOSIS — M19.049 OSTEOARTHRITIS OF HAND, UNSPECIFIED LATERALITY, UNSPECIFIED OSTEOARTHRITIS TYPE: ICD-10-CM

## 2023-02-17 DIAGNOSIS — I10 ESSENTIAL HYPERTENSION, BENIGN: ICD-10-CM

## 2023-02-17 DIAGNOSIS — L03.115 CELLULITIS OF RIGHT LOWER EXTREMITY: ICD-10-CM

## 2023-02-17 DIAGNOSIS — Z00.00 ENCOUNTER FOR MEDICARE ANNUAL WELLNESS EXAM: Primary | ICD-10-CM

## 2023-02-17 DIAGNOSIS — G57.50 TARSAL TUNNEL SYNDROME, UNSPECIFIED LATERALITY: ICD-10-CM

## 2023-02-17 DIAGNOSIS — N18.31 TYPE 2 DIABETES MELLITUS WITH STAGE 3A CHRONIC KIDNEY DISEASE, WITHOUT LONG-TERM CURRENT USE OF INSULIN (H): ICD-10-CM

## 2023-02-17 DIAGNOSIS — M35.00 SICCA SYNDROME (H): ICD-10-CM

## 2023-02-17 DIAGNOSIS — M19.042 OSTEOARTHRITIS OF BOTH HANDS, UNSPECIFIED OSTEOARTHRITIS TYPE: ICD-10-CM

## 2023-02-17 LAB
ALBUMIN SERPL BCG-MCNC: 4.3 G/DL (ref 3.5–5.2)
ALP SERPL-CCNC: 83 U/L (ref 35–104)
ALT SERPL W P-5'-P-CCNC: 22 U/L (ref 10–35)
ANION GAP SERPL CALCULATED.3IONS-SCNC: 16 MMOL/L (ref 7–15)
AST SERPL W P-5'-P-CCNC: 18 U/L (ref 10–35)
BILIRUB SERPL-MCNC: 0.7 MG/DL
BUN SERPL-MCNC: 27.9 MG/DL (ref 8–23)
CALCIUM SERPL-MCNC: 9.4 MG/DL (ref 8.8–10.2)
CHLORIDE SERPL-SCNC: 102 MMOL/L (ref 98–107)
CHOLEST SERPL-MCNC: 184 MG/DL
CREAT SERPL-MCNC: 1 MG/DL (ref 0.51–0.95)
CREAT UR-MCNC: 48.1 MG/DL
DEPRECATED HCO3 PLAS-SCNC: 22 MMOL/L (ref 22–29)
ERYTHROCYTE [DISTWIDTH] IN BLOOD BY AUTOMATED COUNT: 12.8 % (ref 10–15)
GFR SERPL CREATININE-BSD FRML MDRD: 56 ML/MIN/1.73M2
GLUCOSE SERPL-MCNC: 139 MG/DL (ref 70–99)
HBA1C MFR BLD: 7.4 % (ref 0–5.6)
HCT VFR BLD AUTO: 38.7 % (ref 35–47)
HDLC SERPL-MCNC: 58 MG/DL
HGB BLD-MCNC: 12.5 G/DL (ref 11.7–15.7)
HOLD SPECIMEN: NORMAL
HOLD SPECIMEN: NORMAL
LDLC SERPL CALC-MCNC: 52 MG/DL
MCH RBC QN AUTO: 29.7 PG (ref 26.5–33)
MCHC RBC AUTO-ENTMCNC: 32.3 G/DL (ref 31.5–36.5)
MCV RBC AUTO: 92 FL (ref 78–100)
MICROALBUMIN UR-MCNC: <12 MG/L
MICROALBUMIN/CREAT UR: NORMAL MG/G{CREAT}
NONHDLC SERPL-MCNC: 126 MG/DL
PLATELET # BLD AUTO: 256 10E3/UL (ref 150–450)
POTASSIUM SERPL-SCNC: 4.4 MMOL/L (ref 3.4–5.3)
PROT SERPL-MCNC: 6.5 G/DL (ref 6.4–8.3)
RBC # BLD AUTO: 4.21 10E6/UL (ref 3.8–5.2)
SODIUM SERPL-SCNC: 140 MMOL/L (ref 136–145)
TRIGL SERPL-MCNC: 370 MG/DL
WBC # BLD AUTO: 5.5 10E3/UL (ref 4–11)

## 2023-02-17 PROCEDURE — 82043 UR ALBUMIN QUANTITATIVE: CPT | Performed by: FAMILY MEDICINE

## 2023-02-17 PROCEDURE — 99214 OFFICE O/P EST MOD 30 MIN: CPT | Mod: 25 | Performed by: FAMILY MEDICINE

## 2023-02-17 PROCEDURE — 80061 LIPID PANEL: CPT | Performed by: FAMILY MEDICINE

## 2023-02-17 PROCEDURE — 85027 COMPLETE CBC AUTOMATED: CPT | Performed by: FAMILY MEDICINE

## 2023-02-17 PROCEDURE — 80053 COMPREHEN METABOLIC PANEL: CPT | Performed by: FAMILY MEDICINE

## 2023-02-17 PROCEDURE — G0439 PPPS, SUBSEQ VISIT: HCPCS | Performed by: FAMILY MEDICINE

## 2023-02-17 PROCEDURE — 36415 COLL VENOUS BLD VENIPUNCTURE: CPT | Performed by: FAMILY MEDICINE

## 2023-02-17 PROCEDURE — 82570 ASSAY OF URINE CREATININE: CPT | Performed by: FAMILY MEDICINE

## 2023-02-17 PROCEDURE — 83036 HEMOGLOBIN GLYCOSYLATED A1C: CPT | Performed by: FAMILY MEDICINE

## 2023-02-17 RX ORDER — ATORVASTATIN CALCIUM 40 MG/1
40 TABLET, FILM COATED ORAL DAILY
Qty: 90 TABLET | Refills: 3 | Status: SHIPPED | OUTPATIENT
Start: 2023-02-17 | End: 2024-03-28

## 2023-02-17 RX ORDER — MELOXICAM 15 MG/1
15 TABLET ORAL DAILY
Qty: 90 TABLET | Refills: 3 | Status: SHIPPED | OUTPATIENT
Start: 2023-02-17 | End: 2024-03-22

## 2023-02-17 RX ORDER — GABAPENTIN 300 MG/1
900 CAPSULE ORAL 3 TIMES DAILY
Qty: 810 CAPSULE | Refills: 3 | Status: SHIPPED | OUTPATIENT
Start: 2023-02-17 | End: 2024-02-27

## 2023-02-17 RX ORDER — LOSARTAN POTASSIUM 100 MG/1
100 TABLET ORAL DAILY
Qty: 90 TABLET | Refills: 3 | Status: SHIPPED | OUTPATIENT
Start: 2023-02-17 | End: 2024-03-28

## 2023-02-17 RX ORDER — HYDROCHLOROTHIAZIDE 25 MG/1
25 TABLET ORAL DAILY
Qty: 90 TABLET | Refills: 3 | Status: SHIPPED | OUTPATIENT
Start: 2023-02-17 | End: 2024-03-25

## 2023-02-17 RX ORDER — METFORMIN HCL 500 MG
500 TABLET, EXTENDED RELEASE 24 HR ORAL 2 TIMES DAILY WITH MEALS
Qty: 180 TABLET | Refills: 1 | Status: SHIPPED | OUTPATIENT
Start: 2023-02-17 | End: 2023-09-29

## 2023-02-17 RX ORDER — CEPHALEXIN 500 MG/1
500 CAPSULE ORAL 3 TIMES DAILY
Qty: 30 CAPSULE | Refills: 0 | Status: SHIPPED | OUTPATIENT
Start: 2023-02-17 | End: 2023-02-27

## 2023-02-17 RX ORDER — METFORMIN HCL 500 MG
TABLET, EXTENDED RELEASE 24 HR ORAL
Qty: 90 TABLET | Refills: 3 | Status: CANCELLED | OUTPATIENT
Start: 2023-02-17

## 2023-02-17 RX ORDER — AMITRIPTYLINE HYDROCHLORIDE 10 MG/1
10 TABLET ORAL AT BEDTIME
Qty: 90 TABLET | Refills: 3 | Status: SHIPPED | OUTPATIENT
Start: 2023-02-17 | End: 2023-05-02

## 2023-02-17 ASSESSMENT — ENCOUNTER SYMPTOMS
CONSTIPATION: 0
EYE PAIN: 0
PALPITATIONS: 0
NAUSEA: 0
DYSURIA: 0
DIARRHEA: 0
HEADACHES: 0
FEVER: 0
BREAST MASS: 0
HEMATOCHEZIA: 0
ARTHRALGIAS: 0
SHORTNESS OF BREATH: 0
PARESTHESIAS: 0
COUGH: 0
DIZZINESS: 0
SORE THROAT: 0
FREQUENCY: 0
MYALGIAS: 1
JOINT SWELLING: 1
ABDOMINAL PAIN: 0
CHILLS: 0
HEMATURIA: 0
WEAKNESS: 0
NERVOUS/ANXIOUS: 0
HEARTBURN: 0

## 2023-02-17 ASSESSMENT — ACTIVITIES OF DAILY LIVING (ADL): CURRENT_FUNCTION: NO ASSISTANCE NEEDED

## 2023-02-17 NOTE — PROGRESS NOTES
Information on urinary incontinence and treatment options given to patient.  She is at risk for falling and has been provided with information to reduce the risk of falling at home.

## 2023-02-17 NOTE — PATIENT INSTRUCTIONS
Patient Education   Personalized Prevention Plan  You are due for the preventive services outlined below.  Your care team is available to assist you in scheduling these services.  If you have already completed any of these items, please share that information with your care team to update in your medical record.  Health Maintenance Due   Topic Date Due     Asthma Action Plan - yearly  02/11/2022     A1C Lab  02/12/2023     Annual Wellness Visit  02/21/2023     Cholesterol Lab  02/21/2023     Kidney Microalbumin Urine Test  02/21/2023       Urinary Incontinence, Female (Adult)   Urinary incontinence means loss of bladder control. This problem affects many women, especially as they get older. If you have incontinence, you may be embarrassed to ask for help. But know that this problem can be treated.   Types of Incontinence  There are different types of incontinence. Two of the main types are described here. You can have more than one type.     Stress incontinence. With this type, urine leaks when pressure (stress) is put on the bladder. This may happen when you cough, sneeze, or laugh. Stress incontinence most often occurs because the pelvic floor muscles that support the bladder and urethra are weak. This can happen after pregnancy and vaginal childbirth or a hysterectomy. It can also be due to excess body weight or hormone changes.    Urge incontinence (also called overactive bladder). With this type, a sudden urge to urinate is felt often. This may happen even though there may not be much urine in the bladder. The need to urinate often during the night is common. Urge incontinence most often occurs because of bladder spasms. This may be due to bladder irritation or infection. Damage to bladder nerves or pelvic muscles, constipation, and certain medicines can also lead to urge incontinence.  Treatment depends on the cause. Further evaluation is needed to find the type you have. This will likely include an exam and  certain tests. Based on the results, you and your healthcare provider can then plan treatment. Until a diagnosis is made, the home care tips below can help ease symptoms.   Home care    Do pelvic floor muscle exercises, if they are prescribed. The pelvic floor muscles help support the bladder and urethra. Many women find that their symptoms improve when doing special exercises that strengthen these muscles. To do the exercises, contract the muscles you would use to stop your stream of urine. But do this when you re not urinating. Hold for 10 seconds, then relax. Repeat 10 to 20 times in a row, at least 3 times a day. Your healthcare provider may give you other instructions for how to do the exercises and how often.    Keep a bladder diary. This helps track how often and how much you urinate over a set period of time. Bring this diary with you to your next visit with the provider. The information can help your provider learn more about your bladder problem.    Lose weight, if advised to by your provider. Extra weight puts pressure on the bladder. Your provider can help you create a weight-loss plan that s right for you. This may include exercising more and making certain diet changes.    Don't have foods and drinks that may irritate the bladder. These can include alcohol and caffeinated drinks.    Quit smoking. Smoking and other tobacco use can lead to a long-term (chronic) cough that strains the pelvic floor muscles. Smoking may also damage the bladder and urethra. Talk with your provider about treatments or methods you can use to quit smoking.    If drinking large amounts of fluid makes you have symptoms, you may be advised to limit your fluid intake. You may also be advised to drink most of your fluids during the day and to limit fluids at night.    If you re worried about urine leakage or accidents, you may wear absorbent pads to catch urine. Change the pads often. This helps reduce discomfort. It may also reduce  the risk of skin or bladder infections.    Follow-up care  Follow up with your healthcare provider, or as directed. It may take some to find the right treatment for your problem. But healthy lifestyle changes can be made right away. These include such things as exercising on a regular basis, eating a healthy diet, losing weight (if needed), and quitting smoking. Your treatment plan may include special therapies or medicines. Certain procedures or surgery may also be options. Talk about any questions you have with your provider.   When to seek medical advice  Call the healthcare provider right away if any of these occur:    Fever of 100.4 F (38 C) or higher, or as directed by your provider    Bladder pain or fullness    Belly swelling    Nausea or vomiting    Back pain    Weakness, dizziness, or fainting  [employer] last reviewed this educational content on 1/1/2020 2000-2021 [employer]. All rights reserved. This information is not intended as a substitute for professional medical care. Always follow your healthcare professional's instructions.          Preventing Falls at Home  A person can fall for many reasons. Older adults may fall because reaction time slows down as we age. Your muscles and joints may get stiff, weak, or less flexible because of illness, medicines, or a physical condition.   Other health problems that make falls more likely include:     Arthritis    Dizziness or lightheadedness when you stand up (orthostatic hypotension)    History of a stroke    Dizziness    Anemia    Certain medicines taken for mental illness or to control blood pressure.    Problems with balance or gait    Bladder or urinary problems    History of falling    Changes in vision (vision impairment)    Changes in thinking skills and memory (cognitive impairment)  Injuries from a fall can include serious injuries such as broken bones, dislocated joints, internal bleeding and cuts. Injuries like these can limit your  independence.   Prevention tips  To help prevent falls and fall-related injuries, follow the tips below.    Floors  To make floors safer:     Put nonskid pads under area rugs.    Remove small rugs.    Replace worn floor coverings.    Tack carpets firmly to each step on carpeted stairs. Put nonskid strips on the edges of uncarpeted stairs.    Keep floors and stairs free of clutter and cords.    Arrange furniture so there are clear pathways.    Clean up any spills right away.  Bathrooms    To make bathrooms safer:     Install grab bars in the tub or shower.    Apply nonskid strips or put a nonskid rubber mat in the tub or shower.    Sit on a bath chair to bathe.    Use bathmats with nonskid backing.  Lighting  To improve visibility in your home:      Keep a flashlight in each room. Or put a lamp next to the bed within easy reach.    Put nightlights in the bedrooms, hallways, kitchen, and bathrooms.    Make sure all stairways have good lighting.    Take your time when going up and down stairs.    Put handrails on both sides of stairs and in walkways for more support. To prevent injury to your wrist or arm, don t use handrails to pull yourself up.    Install grab bars to pull yourself up.    Move or rearrange items that you use often. This will make them easier to find or reach.    Look at your home to find any safety hazards. Especially look at doorways, walkways, and the driveway. Remove or repair any safety problems that you find.  Other changes to make    Look around to find any safety hazards. Look closely at doorways, walkways, and the driveway. Remove or repair any safety problems that you find.    Wear shoes that fit well.    Take your time when going up and down stairs.    Put handrails on both sides of stairs and in walkways for more support. To prevent injury to your wrist or arm, don t use handrails to pull yourself up.    Install grab bars wherever needed to pull yourself up.    Arrange items that you use  often. This will make them easier to find or reach.    [employer] last reviewed this educational content on 3/1/2020    8694-4297 [employer]. All rights reserved. This information is not intended as a substitute for professional medical care. Always follow your healthcare professional's instructions.

## 2023-02-17 NOTE — PROGRESS NOTES
"SUBJECTIVE:   Wendy is a 82 year old who presents for Preventive Visit.    Patient has been advised of split billing requirements and indicates understanding: Yes     Are you in the first 12 months of your Medicare coverage?  No    Answers for HPI/ROS submitted by the patient on 2/17/2023  In general, how would you rate your overall physical health?: good  Frequency of exercise:: 2-3 days/week  Do you usually eat at least 4 servings of fruit and vegetables a day, include whole grains & fiber, and avoid regularly eating high fat or \"junk\" foods? : Yes  Taking medications regularly:: Yes  Medication side effects:: None  Activities of Daily Living: no assistance needed  Home safety: no safety concerns identified  Hearing Impairment:: no hearing concerns  In the past 6 months, have you been bothered by leaking of urine?: Yes  In general, how would you rate your overall mental or emotional health?: excellent  Additional concerns today:: No  Duration of exercise:: Other      Have you ever done Advance Care Planning? (For example, a Health Directive, POLST, or a discussion with a medical provider or your loved ones about your wishes): No, advance care planning information given to patient to review.  Patient plans to discuss their wishes with loved ones or provider.      Fall risk  Fallen 2 or more times in the past year?: Yes  Any fall with injury in the past year?: Yes    Cognitive Screening   1) Repeat 3 items (Leader, Season, Table)    2) Clock draw: NORMAL  3) 3 item recall: Recalls 3 objects  Results: 3 items recalled: COGNITIVE IMPAIRMENT LESS LIKELY    Reviewed and updated as needed this visit by clinical staff   Tobacco  Allergies  Meds              Reviewed and updated as needed this visit by Provider                 Social History     Tobacco Use     Smoking status: Former     Packs/day: 0.00     Types: Cigarettes     Smokeless tobacco: Never     Tobacco comments:     quit 55 years ago, social only   Substance " Use Topics     Alcohol use: Yes     Alcohol/week: 0.0 - 1.0 standard drinks       Alcohol Use 2/17/2023   Prescreen: >3 drinks/day or >7 drinks/week? No     Diabetes Follow-up      How often are you checking your blood sugar? Not at all    What concerns do you have today about your diabetes? None     Do you have any of these symptoms? (Select all that apply)  Numbness in feet      Hyperlipidemia Follow-Up      Are you regularly taking any medication or supplement to lower your cholesterol?   Yes- Atorvastatin 40 mg daily    Are you having muscle aches or other side effects that you think could be caused by your cholesterol lowering medication?  No    Hypertension Follow-up      Do you check your blood pressure regularly outside of the clinic? Yes     Are you following a low salt diet? Yes    Are your blood pressures ever more than 140 on the top number (systolic) OR more than 90 on the bottom number (diastolic), for example 140/90? No    BP Readings from Last 2 Encounters:   02/17/23 (!) 148/55   01/16/23 126/72     Hemoglobin A1C (%)   Date Value   08/12/2022 6.9 (H)   02/21/2022 7.2 (H)     LDL Cholesterol Calculated   Date Value   02/21/2022 89 mg/dL   08/16/2021      Comment:     Cannot estimate LDL when triglyceride exceeds 400 mg/dL     LDL Cholesterol Direct (mg/dL)   Date Value   04/25/2014 86       Asthma Follow-Up    Was ACT completed today?    Yes    ACT Total Scores 1/16/2023   ACT TOTAL SCORE (Goal Greater than or Equal to 20) 24   In the past 12 months, how many times did you visit the emergency room for your asthma without being admitted to the hospital? 0   In the past 12 months, how many times were you hospitalized overnight because of your asthma? 0          How many days per week do you miss taking your asthma controller medication?  I do not have an asthma controller medication    Please describe any recent triggers for your asthma: upper respiratory infections    Have you had any Emergency Room  Visits, Urgent Care Visits, or Hospital Admissions since your last office visit?  No      Current providers sharing in care for this patient include:   Patient Care Team:  Tsering Henson MD as PCP - General (Family Medicine)  Tsering Henson MD as Assigned PCP    The following health maintenance items are reviewed in Epic and correct as of today:  Health Maintenance   Topic Date Due     ASTHMA ACTION PLAN  02/11/2022     A1C  02/12/2023     MEDICARE ANNUAL WELLNESS VISIT  02/21/2023     LIPID  02/21/2023     MICROALBUMIN  02/21/2023     DTAP/TDAP/TD IMMUNIZATION (3 - Td or Tdap) 04/25/2023     EYE EXAM  07/06/2023     ASTHMA CONTROL TEST  07/16/2023     DIABETIC FOOT EXAM  08/12/2023     ANNUAL REVIEW OF HM ORDERS  08/12/2023     BMP  10/18/2023     HEMOGLOBIN  10/18/2023     FALL RISK ASSESSMENT  02/17/2024     ADVANCE CARE PLANNING  02/21/2027     DEXA  10/06/2035     PHQ-2 (once per calendar year)  Completed     INFLUENZA VACCINE  Completed     Pneumococcal Vaccine: 65+ Years  Completed     URINALYSIS  Completed     ZOSTER IMMUNIZATION  Completed     COVID-19 Vaccine  Completed     IPV IMMUNIZATION  Aged Out     MENINGITIS IMMUNIZATION  Aged Out     Lab work is in process      Mammogram Screening - Patient over age 75, has elected to discontinue screenings.  Pertinent mammograms are reviewed under the imaging tab.    Review of Systems   Constitutional: Negative for chills and fever.   HENT: Negative for congestion, ear pain, hearing loss and sore throat.    Eyes: Negative for pain and visual disturbance.   Respiratory: Negative for cough and shortness of breath.    Cardiovascular: Negative for chest pain, palpitations and peripheral edema.   Gastrointestinal: Negative for abdominal pain, constipation, diarrhea, heartburn, hematochezia and nausea.   Breasts:  Negative for tenderness, breast mass and discharge.   Genitourinary: Negative for dysuria, frequency, genital sores, hematuria, pelvic pain,  "urgency, vaginal bleeding and vaginal discharge.   Musculoskeletal: Positive for joint swelling and myalgias. Negative for arthralgias.   Skin: Positive for rash.   Neurological: Negative for dizziness, weakness, headaches and paresthesias.   Psychiatric/Behavioral: Negative for mood changes. The patient is not nervous/anxious.          OBJECTIVE:   BP (!) 148/55   Pulse 80   Resp 20   Ht 1.594 m (5' 2.75\")   Wt 66.7 kg (147 lb)   SpO2 98%   BMI 26.25 kg/m   Estimated body mass index is 26.25 kg/m  as calculated from the following:    Height as of this encounter: 1.594 m (5' 2.75\").    Weight as of this encounter: 66.7 kg (147 lb).  Physical Exam  GENERAL APPEARANCE: healthy, alert and no distress  EYES: Eyes grossly normal to inspection, PERRL and conjunctivae and sclerae normal  NECK: no adenopathy, no asymmetry, masses, or scars and thyroid normal to palpation, no carotid bruit   RESP: lungs clear to auscultation - no rales, rhonchi or wheezes  CV: regular rate and rhythm, normal S1 S2, no S3 or S4, no murmur, click or rub  SKIN: erythematous rash right posterior lower leg, warm to touch, tender to touch   NEURO: Normal strength and tone, sensory exam grossly normal, mentation intact and speech normal  PSYCH: mentation appears normal and affect normal/bright    Diagnostic Test Results:  Labs reviewed in Epic    ASSESSMENT / PLAN:   Wendy was seen today for wellness visit.    Diagnoses and all orders for this visit:    Encounter for Medicare annual wellness exam  Routine history and physical, updated in EMR.  Up-to-date with bone density scan.  No longer needing colonoscopy and mammogram.  Follow-up in 6 months for follow-up of her chronic medical conditions.  -     PRIMARY CARE FOLLOW-UP SCHEDULING; Future    Nonintractable headache, unspecified chronicity pattern, unspecified headache type  Insomnia, unspecified type  -     amitriptyline (ELAVIL) 10 MG tablet; Take 1 tablet (10 mg) by mouth At Bedtime  -    "  amitriptyline (ELAVIL) 25 MG tablet; Take 1 tablet (25 mg) by mouth At Bedtime    Hyperlipidemia, unspecified hyperlipidemia type  -     atorvastatin (LIPITOR) 40 MG tablet; Take 1 tablet (40 mg) by mouth daily    Osteoarthritis of hand, unspecified laterality, unspecified osteoarthritis type  -     gabapentin (NEURONTIN) 300 MG capsule; Take 3 capsules (900 mg) by mouth 3 times daily    Essential hypertension, benign  -     hydrochlorothiazide (HYDRODIURIL) 25 MG tablet; Take 1 tablet (25 mg) by mouth daily  -     losartan (COZAAR) 100 MG tablet; Take 1 tablet (100 mg) by mouth daily  -     Comprehensive metabolic panel (BMP + Alb, Alk Phos, ALT, AST, Total. Bili, TP)    Osteoarthritis of both hands, unspecified osteoarthritis type  -     meloxicam (MOBIC) 15 MG tablet; Take 1 tablet (15 mg) by mouth daily    Type 2 diabetes mellitus with stage 3a chronic kidney disease, without long-term current use of insulin (H)  Worsening to 7.4. will increase metformin to 2 times daily. F/up In 6 months.   -     metFORMIN (GLUCOPHAGE XR) 500 MG 24 hr tablet; Take 1 tablet (500 mg) by mouth 2 times daily (with meals)    Screening for hyperlipidemia  -     Lipid panel reflex to direct LDL Non-fasting    Stage 3a chronic kidney disease (H)  -     Albumin Random Urine Quantitative with Creat Ratio; Future  -     Comprehensive metabolic panel (BMP + Alb, Alk Phos, ALT, AST, Total. Bili, TP)  -     CBC with platelets    Sicca syndrome (H)  Monitor    Cellulitis of right lower extremity  S/p achilles tendon repair. Now having cellulitis. Will start keflex.   -     cephALEXin (KEFLEX) 500 MG capsule; Take 1 capsule (500 mg) by mouth 3 times daily for 10 days    Acute pain of right shoulder  She was helping her  put up his compression socks and she says she pulled her right shoulder.  Now having pain with internal rotation but is able to do everything else.  Continue to monitor for now.    Tarsal tunnel syndrome, unspecified  "laterality  -     diclofenac (VOLTAREN) 1 % topical gel; [VOLTAREN 1 % GEL] APPLY TO UPPER EXTREMITIES, 2 GM OF GEL TO AFFECTED AREA 4 TIMES DAILY.  DO NOT APPLY MORE THAN 8 GM DAILY TO ANY ONE AFFECTED JOINT. Strength: 1 %    Mild persistent asthma without complication  Saw pulmonology not too long ago. Uses albuterol inhaler as needed.      Other orders  -     HEMOGLOBIN A1C; Future  -     Extra Tube; Future  -     HEMOGLOBIN A1C  -     Extra Tube        Patient has been advised of split billing requirements and indicates understanding: Yes      COUNSELING:  Reviewed preventive health counseling, as reflected in patient instructions       Regular exercise       Healthy diet/nutrition       Osteoporosis prevention/bone health      BMI:   Estimated body mass index is 26.25 kg/m  as calculated from the following:    Height as of this encounter: 1.594 m (5' 2.75\").    Weight as of this encounter: 66.7 kg (147 lb).         She reports that she has quit smoking. Her smoking use included cigarettes. She has never used smokeless tobacco.      Appropriate preventive services were discussed with this patient, including applicable screening as appropriate for cardiovascular disease, diabetes, osteopenia/osteoporosis, and glaucoma.  As appropriate for age/gender, discussed screening for colorectal cancer, prostate cancer, breast cancer, and cervical cancer. Checklist reviewing preventive services available has been given to the patient.    Reviewed patients plan of care and provided an AVS. The Basic Care Plan (routine screening as documented in Health Maintenance) for Wendy meets the Care Plan requirement. This Care Plan has been established and reviewed with the Patient.      Tsering Henson MD  [facility]  "

## 2023-03-12 ENCOUNTER — OFFICE VISIT (OUTPATIENT)
Dept: FAMILY MEDICINE | Facility: CLINIC | Age: 83
End: 2023-03-12
Payer: COMMERCIAL

## 2023-03-12 VITALS
HEART RATE: 90 BPM | RESPIRATION RATE: 16 BRPM | OXYGEN SATURATION: 97 % | DIASTOLIC BLOOD PRESSURE: 63 MMHG | TEMPERATURE: 98.4 F | SYSTOLIC BLOOD PRESSURE: 146 MMHG

## 2023-03-12 DIAGNOSIS — L03.115 CELLULITIS OF RIGHT LOWER EXTREMITY: Primary | ICD-10-CM

## 2023-03-12 DIAGNOSIS — R05.1 ACUTE COUGH: ICD-10-CM

## 2023-03-12 PROCEDURE — 99213 OFFICE O/P EST LOW 20 MIN: CPT

## 2023-03-12 RX ORDER — CEPHALEXIN 500 MG/1
500 CAPSULE ORAL 3 TIMES DAILY
Qty: 30 CAPSULE | Refills: 0 | Status: SHIPPED | OUTPATIENT
Start: 2023-03-12 | End: 2023-03-22

## 2023-03-12 NOTE — PROGRESS NOTES
Assessment & Plan   (L03.115) Cellulitis of right lower extremity  (primary encounter diagnosis)  Plan: cephALEXin (KEFLEX) 500 MG capsule    (R05.1) Acute cough    Cellulitis, discharge instructions for patient instructions discussed and provided.  Informed the patient take the antibiotic as prescribed and finish the full course even if symptoms improve.  We discussed trying yogurt with active cultures or probiotic such as Culturelle daily to help prevent diarrhea while taking the antibiotic.  We also discussed continue to use the albuterol inhaler and Mucinex for her cough.  Informed the patient to return to clinic with any new or worsening symptoms.  Patient acknowledged her understanding of the above plan.    The use of Dragon/UUCUNation services may have been used to construct the content in this note; any grammatical or spelling errors are non-intentional. Please contact the author of this note directly if you are in need of any clarification.      DONALD Washington CNP  [facility]    Kolby Nguyen is a 82 year old female who presents to clinic today for the following health issues:  Chief Complaint   Patient presents with     Cellulitis     Was diagnosed with cellulitis at surgical site about a month and was given an antibiotic, but it's not getting better       Cough     X4d, vocal cords are inflamed, URI     HPI  The patient presents with redness and swelling on the inner right ankle.  The patient previously treated for cellulitis in the same area a month ago.  The patient denies any fever/chills or nausea/vomiting.      The patient also indicates she has had hoarseness, runny nose and cough approximately four days ago.  The patient has been using her albuterol inhaler once every 6 hours as needed for the cough.  She has also been taking Mucinex.     ROS:  Negative except noted above.    Review of Systems        Objective    BP (!) 146/63   Pulse 90   Temp  98.4  F (36.9  C) (Oral)   Resp 16   SpO2 97%   Physical Exam   GENERAL: healthy, alert and no distress  EYES: Eyes grossly normal to inspection, PERRL and conjunctivae and sclerae normal  HENT: ear canals and TM's normal, nose and mouth without ulcers or lesions  NECK: no adenopathy, no asymmetry, masses, or scars and thyroid normal to palpation  RESP: lungs clear to auscultation - no rales, rhonchi or wheezes  CV: regular rate and rhythm, normal S1 S2, no S3 or S4, no murmur, click or rub, no peripheral edema and peripheral pulses strong  MS: no gross musculoskeletal defects noted, no edema  SKIN: Area of erythema on the medial aspect of the right ankle extending upward into the lower leg.  This area is also warmer to touch than the surrounding tissue.

## 2023-03-12 NOTE — PATIENT INSTRUCTIONS
Take the antibiotic as prescribed and finish the full course even if symptoms improve.  Try yogurt with active cultures or probiotics such as Culturelle daily to help prevent diarrhea while using antibiotics.  Continue to use the albuterol inhaler and Mucinex for your cough.

## 2023-03-22 DIAGNOSIS — G47.00 INSOMNIA, UNSPECIFIED TYPE: ICD-10-CM

## 2023-03-22 DIAGNOSIS — R51.9 NONINTRACTABLE HEADACHE, UNSPECIFIED CHRONICITY PATTERN, UNSPECIFIED HEADACHE TYPE: ICD-10-CM

## 2023-03-22 NOTE — TELEPHONE ENCOUNTER
"Pt should have refills on file.      Disp Refills Start End AGUSTÍN   amitriptyline (ELAVIL) 25 MG tablet 90 tablet 3 2/17/2023  No   Sig - Route: Take 1 tablet (25 mg) by mouth At Bedtime - Oral         Requested Prescriptions   Pending Prescriptions Disp Refills     amitriptyline (ELAVIL) 25 MG tablet [Pharmacy Med Name: Amitriptyline HCl Oral Tablet 25 MG] 90 tablet 0     Sig: Take 1 tablet (25 mg) by mouth At Bedtime       Tricyclic Agents ( Annual appt and no PHQ9) Failed - 3/22/2023  2:01 AM        Failed - Blood Pressure under 140/90 in past 12 mos     BP Readings from Last 3 Encounters:   03/12/23 (!) 146/63   02/17/23 (!) 148/55   01/16/23 126/72                 Passed - Recent (12 mo) or future (30 days) visit within authorizing provider's specialty     Patient has had an office visit with the authorizing provider or a provider within the authorizing providers department within the previous 12 mos or has a future within next 30 days. See \"Patient Info\" tab in inbasket, or \"Choose Columns\" in Meds & Orders section of the refill encounter.              Passed - Medication is active on med list        Passed - Patient is age 18 or older        Passed - Patient is not pregnant        Passed - No positive pregnancy test on record in past 12 mos             Dawn Parsons RN 03/22/23 3:18 PM  "

## 2023-04-21 ENCOUNTER — LAB (OUTPATIENT)
Dept: LAB | Facility: CLINIC | Age: 83
End: 2023-04-21
Payer: COMMERCIAL

## 2023-04-21 ENCOUNTER — TRANSFERRED RECORDS (OUTPATIENT)
Dept: HEALTH INFORMATION MANAGEMENT | Facility: CLINIC | Age: 83
End: 2023-04-21

## 2023-04-21 DIAGNOSIS — M79.89 SWELLING OF LIMB: ICD-10-CM

## 2023-04-21 DIAGNOSIS — M25.571 PAIN IN JOINT INVOLVING ANKLE AND FOOT, RIGHT: Primary | ICD-10-CM

## 2023-04-21 LAB
CRP SERPL-MCNC: <3 MG/L
ERYTHROCYTE [DISTWIDTH] IN BLOOD BY AUTOMATED COUNT: 12.8 % (ref 10–15)
ERYTHROCYTE [SEDIMENTATION RATE] IN BLOOD BY WESTERGREN METHOD: 9 MM/HR (ref 0–20)
HCT VFR BLD AUTO: 40.3 % (ref 35–47)
HGB BLD-MCNC: 13 G/DL (ref 11.7–15.7)
MCH RBC QN AUTO: 29.3 PG (ref 26.5–33)
MCHC RBC AUTO-ENTMCNC: 32.3 G/DL (ref 31.5–36.5)
MCV RBC AUTO: 91 FL (ref 78–100)
PLATELET # BLD AUTO: 263 10E3/UL (ref 150–450)
RBC # BLD AUTO: 4.43 10E6/UL (ref 3.8–5.2)
WBC # BLD AUTO: 7.7 10E3/UL (ref 4–11)

## 2023-04-21 PROCEDURE — 85652 RBC SED RATE AUTOMATED: CPT

## 2023-04-21 PROCEDURE — 36415 COLL VENOUS BLD VENIPUNCTURE: CPT

## 2023-04-21 PROCEDURE — 85027 COMPLETE CBC AUTOMATED: CPT

## 2023-04-21 PROCEDURE — 86140 C-REACTIVE PROTEIN: CPT

## 2023-04-26 DIAGNOSIS — G47.00 INSOMNIA, UNSPECIFIED TYPE: ICD-10-CM

## 2023-04-26 DIAGNOSIS — R51.9 NONINTRACTABLE HEADACHE, UNSPECIFIED CHRONICITY PATTERN, UNSPECIFIED HEADACHE TYPE: ICD-10-CM

## 2023-04-28 DIAGNOSIS — R51.9 NONINTRACTABLE HEADACHE, UNSPECIFIED CHRONICITY PATTERN, UNSPECIFIED HEADACHE TYPE: ICD-10-CM

## 2023-04-28 DIAGNOSIS — G47.00 INSOMNIA, UNSPECIFIED TYPE: ICD-10-CM

## 2023-04-29 NOTE — TELEPHONE ENCOUNTER
Should have refills on file.     Last Written Prescription Date:  2/17/23  Last Fill Quantity: 90,  # refills: 3     Bee Carmen RN 04/29/23 1:18 AM

## 2023-05-01 RX ORDER — AMITRIPTYLINE HYDROCHLORIDE 10 MG/1
10 TABLET ORAL AT BEDTIME
Qty: 90 TABLET | Refills: 3 | OUTPATIENT
Start: 2023-05-01

## 2023-05-01 NOTE — TELEPHONE ENCOUNTER
Fax from pharmacy stating they do not have refills available for the  Amitriptyline 10 mg.  Please re-send.

## 2023-05-02 DIAGNOSIS — G47.00 INSOMNIA, UNSPECIFIED TYPE: ICD-10-CM

## 2023-05-02 DIAGNOSIS — R51.9 NONINTRACTABLE HEADACHE, UNSPECIFIED CHRONICITY PATTERN, UNSPECIFIED HEADACHE TYPE: ICD-10-CM

## 2023-05-02 RX ORDER — AMITRIPTYLINE HYDROCHLORIDE 10 MG/1
10 TABLET ORAL AT BEDTIME
Qty: 90 TABLET | Refills: 3 | Status: SHIPPED | OUTPATIENT
Start: 2023-05-02 | End: 2023-09-29

## 2023-05-02 NOTE — TELEPHONE ENCOUNTER
"Should have refills on file.  Same pharmacy.  Receipt confirmed by pharmacy.    Last Written Prescription Date:  02/17/2023  Last Fill Quantity: 90,  # refills: 3   Last office visit provider:   02/17/2023    Requested Prescriptions   Pending Prescriptions Disp Refills     amitriptyline (ELAVIL) 10 MG tablet 90 tablet 3     Sig: Take 1 tablet (10 mg) by mouth At Bedtime       Tricyclic Agents ( Annual appt and no PHQ9) Failed - 5/1/2023  6:07 PM        Failed - Blood Pressure under 140/90 in past 12 mos     BP Readings from Last 3 Encounters:   03/12/23 (!) 146/63   02/17/23 (!) 148/55   01/16/23 126/72                 Passed - Recent (12 mo) or future (30 days) visit within authorizing provider's specialty     Patient has had an office visit with the authorizing provider or a provider within the authorizing providers department within the previous 12 mos or has a future within next 30 days. See \"Patient Info\" tab in inbasket, or \"Choose Columns\" in Meds & Orders section of the refill encounter.              Passed - Medication is active on med list        Passed - Patient is age 18 or older        Passed - Patient is not pregnant        Passed - No positive pregnancy test on record in past 12 mos         Refused Prescriptions Disp Refills     amitriptyline (ELAVIL) 25 MG tablet [Pharmacy Med Name: Amitriptyline HCl Oral Tablet 25 MG] 90 tablet 0     Sig: Take 1 tablet (25 mg) by mouth At Bedtime       Tricyclic Agents ( Annual appt and no PHQ9) Failed - 5/1/2023  6:07 PM        Failed - Blood Pressure under 140/90 in past 12 mos     BP Readings from Last 3 Encounters:   03/12/23 (!) 146/63   02/17/23 (!) 148/55   01/16/23 126/72                 Passed - Recent (12 mo) or future (30 days) visit within authorizing provider's specialty     Patient has had an office visit with the authorizing provider or a provider within the authorizing providers department within the previous 12 mos or has a future within next 30 " "days. See \"Patient Info\" tab in inbasket, or \"Choose Columns\" in Meds & Orders section of the refill encounter.              Passed - Medication is active on med list        Passed - Patient is age 18 or older        Passed - Patient is not pregnant        Passed - No positive pregnancy test on record in past 12 mos             Heavenly Starr 05/01/23 7:43 PM  "

## 2023-05-05 DIAGNOSIS — R51.9 NONINTRACTABLE HEADACHE, UNSPECIFIED CHRONICITY PATTERN, UNSPECIFIED HEADACHE TYPE: ICD-10-CM

## 2023-05-05 DIAGNOSIS — G47.00 INSOMNIA, UNSPECIFIED TYPE: ICD-10-CM

## 2023-05-06 NOTE — TELEPHONE ENCOUNTER
Refused as there should already be refills on file  Filled 2/17/2023 disp 90 with 3 refills  Erenstine Montes De Oca RN   05/06/23 2:43 PM  [facility] Nurse Advisor

## 2023-07-11 ENCOUNTER — OFFICE VISIT (OUTPATIENT)
Dept: FAMILY MEDICINE | Facility: CLINIC | Age: 83
End: 2023-07-11
Payer: COMMERCIAL

## 2023-07-11 VITALS
RESPIRATION RATE: 16 BRPM | OXYGEN SATURATION: 97 % | SYSTOLIC BLOOD PRESSURE: 148 MMHG | HEART RATE: 83 BPM | TEMPERATURE: 98.7 F | DIASTOLIC BLOOD PRESSURE: 63 MMHG

## 2023-07-11 DIAGNOSIS — R05.1 ACUTE COUGH: Primary | ICD-10-CM

## 2023-07-11 PROCEDURE — 99213 OFFICE O/P EST LOW 20 MIN: CPT | Performed by: PHYSICIAN ASSISTANT

## 2023-07-11 NOTE — PATIENT INSTRUCTIONS
Lungs are sounding clear at this time.   Continue with Albuterol as need every 6 hours.   Continue with Mucinex and lots of hydration.   Follow up if no improvement in 1 week. Follow up sooner if you develop new or worsening symptoms.

## 2023-07-11 NOTE — PROGRESS NOTES
Patient presents with:  Cough: Pt asthmatic, coughing up some mucus, no wheezing, x 3 days, no chest pain or SOB      Clinical Decision Making:  Patient experiencing acute cough.  Lungs are CTA and patient is vitally stable.  Patient is very reassured by these things.  She does not feel that she needs any oral steroid or antibiotic at this time and I agree with her.  She will continue with current treatment plan.  She has plenty of albuterol at home to use as needed.  Her cough is not keeping her awake at night.      ICD-10-CM    1. Acute cough  R05.1           Patient Instructions   1. Lungs are sounding clear at this time.   2. Continue with Albuterol as need every 6 hours.   3. Continue with Mucinex and lots of hydration.   4. Follow up if no improvement in 1 week. Follow up sooner if you develop new or worsening symptoms.       HPI:  Wendy Suárez is a 82 year old female with past medical history of chronic kidney disease, asthma, upper airway cough syndrome, type 2 diabetes, hyperlipidemia, hypertension who presents today complaining of productive cough for the past 3 days.  Patient denies any wheezing, chest pain, or shortness of breath.  Patient wanted to make sure that she was not sounding wheezy.  She was encouraged to come in by her family members also.    History obtained from the patient.    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  2010-09: Hot flashes  Carpal Tunnel Syndrome  Menopause  Headache  Tarsal Tunnel Syndrome  Bronchospasm  Hyperlipidemia  Benign Essential  Hypertension  Osteoarthritis Of The Hand  Cough  Essential hypertension  Asthma      Past Medical History:   Diagnosis Date     Asthma     high IgE, elevated FENO 115 highest     Cough      Hypertension        Social History     Tobacco Use     Smoking status: Former     Packs/day: 0.00     Types: Cigarettes     Smokeless tobacco: Never     Tobacco comments:     quit 55 years ago, social only   Substance Use Topics     Alcohol use: Yes     Alcohol/week: 0.0 - 1.0 standard drinks of alcohol       Review of Systems    Vitals:    07/11/23 1321   BP: (!) 148/63   Pulse: 83   Resp: 16   Temp: 98.7  F (37.1  C)   TempSrc: Oral   SpO2: 97%       Physical Exam  Vitals and nursing note reviewed.   Constitutional:       General: She is not in acute distress.     Appearance: She is not toxic-appearing or diaphoretic.   HENT:      Head: Normocephalic and atraumatic.      Right Ear: External ear normal.      Left Ear: External ear normal.   Eyes:      Conjunctiva/sclera: Conjunctivae normal.   Cardiovascular:      Rate and Rhythm: Normal rate and regular rhythm.      Heart sounds: No murmur heard.  Pulmonary:      Effort: Pulmonary effort is normal. No respiratory distress.      Breath sounds: No stridor. No wheezing, rhonchi or rales.   Neurological:      Mental Status: She is alert.   Psychiatric:         Mood and Affect: Mood normal.         Behavior: Behavior normal.         Thought Content: Thought content normal.         Judgment: Judgment normal.         At the end of the encounter, I discussed results, diagnosis, medications. Discussed red flags for immediate return to clinic/ER, as well as indications for follow up if no improvement. Patient understood and agreed to plan. Patient was stable for discharge.

## 2023-07-12 ENCOUNTER — TRANSFERRED RECORDS (OUTPATIENT)
Dept: HEALTH INFORMATION MANAGEMENT | Facility: CLINIC | Age: 83
End: 2023-07-12
Payer: COMMERCIAL

## 2023-07-12 LAB — RETINOPATHY: NEGATIVE

## 2023-09-06 ENCOUNTER — ALLIED HEALTH/NURSE VISIT (OUTPATIENT)
Dept: FAMILY MEDICINE | Facility: CLINIC | Age: 83
End: 2023-09-06
Payer: COMMERCIAL

## 2023-09-06 DIAGNOSIS — Z23 NEED FOR VACCINATION: Primary | ICD-10-CM

## 2023-09-06 PROCEDURE — 99207 PR NO CHARGE NURSE ONLY: CPT

## 2023-09-06 PROCEDURE — 90662 IIV NO PRSV INCREASED AG IM: CPT

## 2023-09-06 PROCEDURE — G0008 ADMIN INFLUENZA VIRUS VAC: HCPCS

## 2023-09-19 DIAGNOSIS — G47.00 INSOMNIA, UNSPECIFIED TYPE: ICD-10-CM

## 2023-09-19 DIAGNOSIS — R51.9 NONINTRACTABLE HEADACHE, UNSPECIFIED CHRONICITY PATTERN, UNSPECIFIED HEADACHE TYPE: ICD-10-CM

## 2023-09-28 DIAGNOSIS — G47.00 INSOMNIA, UNSPECIFIED TYPE: ICD-10-CM

## 2023-09-28 DIAGNOSIS — R51.9 NONINTRACTABLE HEADACHE, UNSPECIFIED CHRONICITY PATTERN, UNSPECIFIED HEADACHE TYPE: ICD-10-CM

## 2023-09-28 NOTE — TELEPHONE ENCOUNTER
Incoming fax from pharmacy. They state there are no more refills on file for the amitriptyline 25mg. Please advise.

## 2023-09-29 ENCOUNTER — ANCILLARY PROCEDURE (OUTPATIENT)
Dept: GENERAL RADIOLOGY | Facility: CLINIC | Age: 83
End: 2023-09-29
Attending: FAMILY MEDICINE
Payer: COMMERCIAL

## 2023-09-29 ENCOUNTER — OFFICE VISIT (OUTPATIENT)
Dept: FAMILY MEDICINE | Facility: CLINIC | Age: 83
End: 2023-09-29
Payer: COMMERCIAL

## 2023-09-29 VITALS
WEIGHT: 143 LBS | TEMPERATURE: 98.1 F | SYSTOLIC BLOOD PRESSURE: 138 MMHG | RESPIRATION RATE: 20 BRPM | OXYGEN SATURATION: 96 % | DIASTOLIC BLOOD PRESSURE: 43 MMHG | BODY MASS INDEX: 25.34 KG/M2 | HEIGHT: 63 IN | HEART RATE: 88 BPM

## 2023-09-29 DIAGNOSIS — E78.5 HYPERLIPIDEMIA, UNSPECIFIED HYPERLIPIDEMIA TYPE: ICD-10-CM

## 2023-09-29 DIAGNOSIS — I10 ESSENTIAL HYPERTENSION, BENIGN: ICD-10-CM

## 2023-09-29 DIAGNOSIS — J45.21 MILD INTERMITTENT ASTHMA WITH ACUTE EXACERBATION: ICD-10-CM

## 2023-09-29 DIAGNOSIS — R06.02 SHORTNESS OF BREATH: ICD-10-CM

## 2023-09-29 DIAGNOSIS — R51.9 NONINTRACTABLE HEADACHE, UNSPECIFIED CHRONICITY PATTERN, UNSPECIFIED HEADACHE TYPE: ICD-10-CM

## 2023-09-29 DIAGNOSIS — G47.00 INSOMNIA, UNSPECIFIED TYPE: ICD-10-CM

## 2023-09-29 DIAGNOSIS — R13.19 ESOPHAGEAL DYSPHAGIA: ICD-10-CM

## 2023-09-29 DIAGNOSIS — R05.1 ACUTE COUGH: ICD-10-CM

## 2023-09-29 DIAGNOSIS — R06.2 WHEEZING: ICD-10-CM

## 2023-09-29 DIAGNOSIS — R05.8 DRY COUGH: ICD-10-CM

## 2023-09-29 DIAGNOSIS — L30.9 DERMATITIS: ICD-10-CM

## 2023-09-29 DIAGNOSIS — R05.1 ACUTE COUGH: Primary | ICD-10-CM

## 2023-09-29 DIAGNOSIS — E11.22 TYPE 2 DIABETES MELLITUS WITH STAGE 3A CHRONIC KIDNEY DISEASE, WITHOUT LONG-TERM CURRENT USE OF INSULIN (H): ICD-10-CM

## 2023-09-29 DIAGNOSIS — N18.31 TYPE 2 DIABETES MELLITUS WITH STAGE 3A CHRONIC KIDNEY DISEASE, WITHOUT LONG-TERM CURRENT USE OF INSULIN (H): ICD-10-CM

## 2023-09-29 LAB
ALBUMIN SERPL BCG-MCNC: 4.4 G/DL (ref 3.5–5.2)
ALP SERPL-CCNC: 77 U/L (ref 35–104)
ALT SERPL W P-5'-P-CCNC: 22 U/L (ref 0–50)
ANION GAP SERPL CALCULATED.3IONS-SCNC: 16 MMOL/L (ref 7–15)
AST SERPL W P-5'-P-CCNC: 18 U/L (ref 0–45)
BILIRUB SERPL-MCNC: 0.6 MG/DL
BUN SERPL-MCNC: 30.5 MG/DL (ref 8–23)
CALCIUM SERPL-MCNC: 9.3 MG/DL (ref 8.8–10.2)
CHLORIDE SERPL-SCNC: 101 MMOL/L (ref 98–107)
CREAT SERPL-MCNC: 1.01 MG/DL (ref 0.51–0.95)
DEPRECATED HCO3 PLAS-SCNC: 22 MMOL/L (ref 22–29)
EGFRCR SERPLBLD CKD-EPI 2021: 55 ML/MIN/1.73M2
ERYTHROCYTE [DISTWIDTH] IN BLOOD BY AUTOMATED COUNT: 12.2 % (ref 10–15)
GLUCOSE SERPL-MCNC: 163 MG/DL (ref 70–99)
HBA1C MFR BLD: 7.1 % (ref 0–5.6)
HCT VFR BLD AUTO: 38.3 % (ref 35–47)
HGB BLD-MCNC: 12.7 G/DL (ref 11.7–15.7)
HOLD SPECIMEN: NORMAL
MCH RBC QN AUTO: 30.9 PG (ref 26.5–33)
MCHC RBC AUTO-ENTMCNC: 33.2 G/DL (ref 31.5–36.5)
MCV RBC AUTO: 93 FL (ref 78–100)
PLATELET # BLD AUTO: 303 10E3/UL (ref 150–450)
POTASSIUM SERPL-SCNC: 3.9 MMOL/L (ref 3.4–5.3)
PROT SERPL-MCNC: 6.7 G/DL (ref 6.4–8.3)
RBC # BLD AUTO: 4.11 10E6/UL (ref 3.8–5.2)
SODIUM SERPL-SCNC: 139 MMOL/L (ref 135–145)
WBC # BLD AUTO: 8.7 10E3/UL (ref 4–11)

## 2023-09-29 PROCEDURE — 71046 X-RAY EXAM CHEST 2 VIEWS: CPT | Mod: TC | Performed by: RADIOLOGY

## 2023-09-29 PROCEDURE — 36415 COLL VENOUS BLD VENIPUNCTURE: CPT | Performed by: FAMILY MEDICINE

## 2023-09-29 PROCEDURE — 85027 COMPLETE CBC AUTOMATED: CPT | Performed by: FAMILY MEDICINE

## 2023-09-29 PROCEDURE — 83036 HEMOGLOBIN GLYCOSYLATED A1C: CPT | Performed by: FAMILY MEDICINE

## 2023-09-29 PROCEDURE — 99214 OFFICE O/P EST MOD 30 MIN: CPT | Performed by: FAMILY MEDICINE

## 2023-09-29 PROCEDURE — 80053 COMPREHEN METABOLIC PANEL: CPT | Performed by: FAMILY MEDICINE

## 2023-09-29 RX ORDER — PREDNISONE 20 MG/1
40 TABLET ORAL DAILY
Qty: 10 TABLET | Refills: 0 | Status: SHIPPED | OUTPATIENT
Start: 2023-09-29 | End: 2023-10-04

## 2023-09-29 RX ORDER — METFORMIN HCL 500 MG
500 TABLET, EXTENDED RELEASE 24 HR ORAL 2 TIMES DAILY WITH MEALS
Qty: 180 TABLET | Refills: 3 | Status: SHIPPED | OUTPATIENT
Start: 2023-09-29 | End: 2024-09-19

## 2023-09-29 RX ORDER — TRIAMCINOLONE ACETONIDE 1 MG/G
CREAM TOPICAL 2 TIMES DAILY
Qty: 80 G | Refills: 1 | Status: SHIPPED | OUTPATIENT
Start: 2023-09-29

## 2023-09-29 RX ORDER — AMITRIPTYLINE HYDROCHLORIDE 10 MG/1
10 TABLET ORAL AT BEDTIME
Qty: 90 TABLET | Refills: 3 | Status: SHIPPED | OUTPATIENT
Start: 2023-09-29

## 2023-09-29 RX ORDER — AZITHROMYCIN 250 MG/1
TABLET, FILM COATED ORAL
Qty: 6 TABLET | Refills: 0 | Status: SHIPPED | OUTPATIENT
Start: 2023-09-29 | End: 2023-10-04

## 2023-09-29 ASSESSMENT — ASTHMA QUESTIONNAIRES: ACT_TOTALSCORE: 15

## 2023-09-29 NOTE — PROGRESS NOTES
"  Assessment & Plan     Acute cough  Wheezing  Shortness of breath  Mild intermittent asthma with acute exacerbation  Day 10th of symptoms. Wheezing on exam.  We will put her on a Z-Aleksey and prednisone.  Chest x-ray obtained and personally reviewed to rule out pneumonia.  - XR Chest 2 Views  - CBC with platelets  - Comprehensive metabolic panel (BMP + Alb, Alk Phos, ALT, AST, Total. Bili, TP)  - predniSONE (DELTASONE) 20 MG tablet  Dispense: 10 tablet; Refill: 0  - azithromycin (ZITHROMAX) 250 MG tablet  Dispense: 6 tablet; Refill: 0    Type 2 diabetes mellitus with stage 3a chronic kidney disease, without long-term current use of insulin (H)  A1c 7.1.  Stable.  Refill given for her.  - CBC with platelets  - Comprehensive metabolic panel (BMP + Alb, Alk Phos, ALT, AST, Total. Bili, TP)  - metFORMIN (GLUCOPHAGE XR) 500 MG 24 hr tablet  Dispense: 180 tablet; Refill: 3    Hyperlipidemia, unspecified hyperlipidemia type  Tolerating without issues    Essential hypertension, benign  Stable.     Dermatitis  Around right ankle after a cast.   - triamcinolone (KENALOG) 0.1 % external cream  Dispense: 80 g; Refill: 1    Nonintractable headache, unspecified chronicity pattern, unspecified headache type  Stable.   - amitriptyline (ELAVIL) 10 MG tablet  Dispense: 90 tablet; Refill: 3  - amitriptyline (ELAVIL) 25 MG tablet  Dispense: 90 tablet; Refill: 3    Insomnia, unspecified type  Stable.   - amitriptyline (ELAVIL) 10 MG tablet  Dispense: 90 tablet; Refill: 3  - amitriptyline (ELAVIL) 25 MG tablet  Dispense: 90 tablet; Refill: 3    Dry cough  Esophageal dysphagia  Stable.   - omeprazole (PRILOSEC) 20 MG DR capsule  Dispense: 90 capsule; Refill: 3        BMI:   Estimated body mass index is 25.53 kg/m  as calculated from the following:    Height as of this encounter: 1.594 m (5' 2.75\").    Weight as of this encounter: 64.9 kg (143 lb).       MD RONEY [facility]    Kolby Gallardoan is " "a 83 year old, presenting for the following health issues:  Diabetes and Cough (Productive cough, yellowish/gray phlegm, SOB, chest congestion, x 10 days Denies fever)        9/29/2023     1:56 PM   Additional Questions   Roomed by Romina OROZCO LPN       History of Present Illness       Reason for visit:  Medication check and wellness visit and upper respiration conjestion for 10 days    She eats 2-3 servings of fruits and vegetables daily.She consumes 1 sweetened beverage(s) daily.She exercises with enough effort to increase her heart rate 60 or more minutes per day.  She exercises with enough effort to increase her heart rate 7 days per week.   She is taking medications regularly.      Coughing x 10 days      Review of Systems   Constitutional, HEENT, cardiovascular, pulmonary, gi and gu systems are negative, except as otherwise noted.      Objective    /43   Pulse 88   Temp 98.1  F (36.7  C) (Oral)   Resp 20   Ht 1.594 m (5' 2.75\")   Wt 64.9 kg (143 lb)   SpO2 96%   BMI 25.53 kg/m    Body mass index is 25.53 kg/m .  Physical Exam   GENERAL: healthy, alert and no distress  NECK: no adenopathy, no asymmetry, masses, or scars and thyroid normal to palpation  RESP: Wheezing throughout  CV: regular rate and rhythm, normal S1 S2, no S3 or S4, no murmur, click or rub, no peripheral edema  MS: no gross musculoskeletal defects noted, no edema  NEURO: Normal strength and tone, mentation intact and speech normal  PSYCH: mentation appears normal, affect normal/bright  Diabetic foot exam: normal DP and PT pulses, no trophic changes or ulcerative lesions, and absent monofilament exam    Results for orders placed or performed in visit on 09/29/23 (from the past 24 hour(s))   Hemoglobin A1c   Result Value Ref Range    Hemoglobin A1C 7.1 (H) 0.0 - 5.6 %   Extra Tube    Narrative    The following orders were created for panel order Extra Tube.  Procedure                               Abnormality         Status               "       ---------                               -----------         ------                     Extra Green Top (Lithium...[[id number]]                      In process                   Please view results for these tests on the individual orders.   CBC with platelets   Result Value Ref Range    WBC Count 8.7 4.0 - 11.0 10e3/uL    RBC Count 4.11 3.80 - 5.20 10e6/uL    Hemoglobin 12.7 11.7 - 15.7 g/dL    Hematocrit 38.3 35.0 - 47.0 %    MCV 93 78 - 100 fL    MCH 30.9 26.5 - 33.0 pg    MCHC 33.2 31.5 - 36.5 g/dL    RDW 12.2 10.0 - 15.0 %    Platelet Count 303 150 - 450 10e3/uL

## 2023-10-02 ENCOUNTER — TELEPHONE (OUTPATIENT)
Dept: FAMILY MEDICINE | Facility: CLINIC | Age: 83
End: 2023-10-02
Payer: COMMERCIAL

## 2023-10-02 NOTE — TELEPHONE ENCOUNTER
General Call    Contacts         Type Contact Phone/Fax    10/02/2023 03:45 PM CDT Phone (Incoming) Wendy Suárez (Self) [phone] (H)          Reason for Call:  Patient calling in to have results read from her chest x-ray from last Friday. I informed pt. I cannot read anything because the doctor has not commented on this yet. Informed her we will call with results once a provider puts in interpretation from x-rays.     Date of last appointment with provider: 9-    Could we send this information to you in [employer] or would you prefer to receive a phone call?:   Patient would prefer a phone call   Okay to leave a detailed message?: Yes at Cell number on file:    [phone]

## 2023-10-03 ENCOUNTER — TELEPHONE (OUTPATIENT)
Dept: FAMILY MEDICINE | Facility: CLINIC | Age: 83
End: 2023-10-03
Payer: COMMERCIAL

## 2023-10-03 NOTE — TELEPHONE ENCOUNTER
----- Message from Tsering Henson MD sent at 10/3/2023  9:11 AM CDT -----  Please let patient know and send result letter: rest of lab is fine. She has chronic kidney insufficiency and this is stable, similar to last checks.

## 2024-01-18 ENCOUNTER — PATIENT OUTREACH (OUTPATIENT)
Dept: CARE COORDINATION | Facility: CLINIC | Age: 84
End: 2024-01-18
Payer: COMMERCIAL

## 2024-02-01 ENCOUNTER — PATIENT OUTREACH (OUTPATIENT)
Dept: CARE COORDINATION | Facility: CLINIC | Age: 84
End: 2024-02-01
Payer: COMMERCIAL

## 2024-02-27 ENCOUNTER — PATIENT OUTREACH (OUTPATIENT)
Dept: FAMILY MEDICINE | Facility: CLINIC | Age: 84
End: 2024-02-27
Payer: COMMERCIAL

## 2024-02-27 DIAGNOSIS — M19.049 OSTEOARTHRITIS OF HAND, UNSPECIFIED LATERALITY, UNSPECIFIED OSTEOARTHRITIS TYPE: ICD-10-CM

## 2024-02-27 RX ORDER — GABAPENTIN 300 MG/1
900 CAPSULE ORAL 3 TIMES DAILY
Qty: 810 CAPSULE | Refills: 3 | Status: SHIPPED | OUTPATIENT
Start: 2024-02-27 | End: 2024-03-05

## 2024-02-27 NOTE — TELEPHONE ENCOUNTER
Patient Quality Outreach    Patient is due for the following:   Physical Annual Wellness Visit    Next Steps:   Patient has upcoming appointment, these items will be addressed at that time.    Type of outreach:    Chart review performed, no outreach needed.      Questions for provider review:    None           Emelia Bar MA

## 2024-03-04 DIAGNOSIS — M19.049 OSTEOARTHRITIS OF HAND, UNSPECIFIED LATERALITY, UNSPECIFIED OSTEOARTHRITIS TYPE: ICD-10-CM

## 2024-03-05 RX ORDER — GABAPENTIN 300 MG/1
900 CAPSULE ORAL 3 TIMES DAILY
Qty: 810 CAPSULE | Refills: 0 | Status: SHIPPED | OUTPATIENT
Start: 2024-03-05 | End: 2024-03-28

## 2024-03-21 DIAGNOSIS — M19.042 OSTEOARTHRITIS OF BOTH HANDS, UNSPECIFIED OSTEOARTHRITIS TYPE: ICD-10-CM

## 2024-03-21 DIAGNOSIS — M19.041 OSTEOARTHRITIS OF BOTH HANDS, UNSPECIFIED OSTEOARTHRITIS TYPE: ICD-10-CM

## 2024-03-22 RX ORDER — MELOXICAM 15 MG/1
TABLET ORAL
Qty: 90 TABLET | Refills: 0 | Status: SHIPPED | OUTPATIENT
Start: 2024-03-22 | End: 2024-03-28

## 2024-03-23 DIAGNOSIS — I10 ESSENTIAL HYPERTENSION, BENIGN: ICD-10-CM

## 2024-03-25 RX ORDER — HYDROCHLOROTHIAZIDE 25 MG/1
25 TABLET ORAL DAILY
Qty: 90 TABLET | Refills: 0 | Status: SHIPPED | OUTPATIENT
Start: 2024-03-25 | End: 2024-03-28

## 2024-03-28 ENCOUNTER — OFFICE VISIT (OUTPATIENT)
Dept: FAMILY MEDICINE | Facility: CLINIC | Age: 84
End: 2024-03-28
Payer: COMMERCIAL

## 2024-03-28 VITALS
TEMPERATURE: 97.9 F | SYSTOLIC BLOOD PRESSURE: 129 MMHG | BODY MASS INDEX: 25.03 KG/M2 | HEART RATE: 87 BPM | DIASTOLIC BLOOD PRESSURE: 61 MMHG | OXYGEN SATURATION: 97 % | RESPIRATION RATE: 16 BRPM | HEIGHT: 62 IN | WEIGHT: 136 LBS

## 2024-03-28 DIAGNOSIS — N18.31 STAGE 3A CHRONIC KIDNEY DISEASE (H): ICD-10-CM

## 2024-03-28 DIAGNOSIS — E11.22 TYPE 2 DIABETES MELLITUS WITH STAGE 3A CHRONIC KIDNEY DISEASE, WITHOUT LONG-TERM CURRENT USE OF INSULIN (H): ICD-10-CM

## 2024-03-28 DIAGNOSIS — E78.5 HYPERLIPIDEMIA, UNSPECIFIED HYPERLIPIDEMIA TYPE: ICD-10-CM

## 2024-03-28 DIAGNOSIS — M19.042 OSTEOARTHRITIS OF BOTH HANDS, UNSPECIFIED OSTEOARTHRITIS TYPE: Primary | ICD-10-CM

## 2024-03-28 DIAGNOSIS — I10 ESSENTIAL HYPERTENSION, BENIGN: ICD-10-CM

## 2024-03-28 DIAGNOSIS — M21.169 BOWING DEFORMITY OF LOWER LEG: ICD-10-CM

## 2024-03-28 DIAGNOSIS — M35.00 SICCA SYNDROME (H): ICD-10-CM

## 2024-03-28 DIAGNOSIS — M19.041 OSTEOARTHRITIS OF BOTH HANDS, UNSPECIFIED OSTEOARTHRITIS TYPE: Primary | ICD-10-CM

## 2024-03-28 DIAGNOSIS — N18.31 TYPE 2 DIABETES MELLITUS WITH STAGE 3A CHRONIC KIDNEY DISEASE, WITHOUT LONG-TERM CURRENT USE OF INSULIN (H): ICD-10-CM

## 2024-03-28 LAB — HBA1C MFR BLD: 7.1 % (ref 0–5.6)

## 2024-03-28 PROCEDURE — 99214 OFFICE O/P EST MOD 30 MIN: CPT | Performed by: FAMILY MEDICINE

## 2024-03-28 PROCEDURE — 80053 COMPREHEN METABOLIC PANEL: CPT | Performed by: FAMILY MEDICINE

## 2024-03-28 PROCEDURE — 36415 COLL VENOUS BLD VENIPUNCTURE: CPT | Performed by: FAMILY MEDICINE

## 2024-03-28 PROCEDURE — 83036 HEMOGLOBIN GLYCOSYLATED A1C: CPT | Performed by: FAMILY MEDICINE

## 2024-03-28 PROCEDURE — 80061 LIPID PANEL: CPT | Performed by: FAMILY MEDICINE

## 2024-03-28 PROCEDURE — G2211 COMPLEX E/M VISIT ADD ON: HCPCS | Performed by: FAMILY MEDICINE

## 2024-03-28 RX ORDER — GABAPENTIN 300 MG/1
600 CAPSULE ORAL 3 TIMES DAILY
Qty: 810 CAPSULE | Refills: 0 | Status: SHIPPED | OUTPATIENT
Start: 2024-03-28

## 2024-03-28 RX ORDER — HYDROCHLOROTHIAZIDE 25 MG/1
25 TABLET ORAL DAILY
Qty: 90 TABLET | Refills: 0 | Status: SHIPPED | OUTPATIENT
Start: 2024-03-28

## 2024-03-28 RX ORDER — GABAPENTIN 300 MG/1
900 CAPSULE ORAL 3 TIMES DAILY
Qty: 810 CAPSULE | Refills: 0 | Status: SHIPPED | OUTPATIENT
Start: 2024-03-28 | End: 2024-03-28

## 2024-03-28 RX ORDER — MELOXICAM 15 MG/1
TABLET ORAL
Qty: 90 TABLET | Refills: 0 | Status: SHIPPED | OUTPATIENT
Start: 2024-03-28 | End: 2024-09-12

## 2024-03-28 RX ORDER — LOSARTAN POTASSIUM 100 MG/1
100 TABLET ORAL DAILY
Qty: 90 TABLET | Refills: 3 | Status: SHIPPED | OUTPATIENT
Start: 2024-03-28

## 2024-03-28 RX ORDER — RESPIRATORY SYNCYTIAL VIRUS VACCINE 120MCG/0.5
0.5 KIT INTRAMUSCULAR ONCE
Qty: 1 EACH | Refills: 0 | Status: CANCELLED | OUTPATIENT
Start: 2024-03-28 | End: 2024-03-28

## 2024-03-28 RX ORDER — ATORVASTATIN CALCIUM 40 MG/1
40 TABLET, FILM COATED ORAL DAILY
Qty: 90 TABLET | Refills: 3 | Status: SHIPPED | OUTPATIENT
Start: 2024-03-28

## 2024-03-28 ASSESSMENT — ASTHMA QUESTIONNAIRES
QUESTION_5 LAST FOUR WEEKS HOW WOULD YOU RATE YOUR ASTHMA CONTROL: WELL CONTROLLED
ACT_TOTALSCORE: 23
ACT_TOTALSCORE: 23
QUESTION_4 LAST FOUR WEEKS HOW OFTEN HAVE YOU USED YOUR RESCUE INHALER OR NEBULIZER MEDICATION (SUCH AS ALBUTEROL): ONCE A WEEK OR LESS
QUESTION_1 LAST FOUR WEEKS HOW MUCH OF THE TIME DID YOUR ASTHMA KEEP YOU FROM GETTING AS MUCH DONE AT WORK, SCHOOL OR AT HOME: NONE OF THE TIME
QUESTION_2 LAST FOUR WEEKS HOW OFTEN HAVE YOU HAD SHORTNESS OF BREATH: NOT AT ALL
QUESTION_3 LAST FOUR WEEKS HOW OFTEN DID YOUR ASTHMA SYMPTOMS (WHEEZING, COUGHING, SHORTNESS OF BREATH, CHEST TIGHTNESS OR PAIN) WAKE YOU UP AT NIGHT OR EARLIER THAN USUAL IN THE MORNING: NOT AT ALL

## 2024-03-28 NOTE — PROGRESS NOTES
Assessment & Plan     Osteoarthritis of both hands, unspecified osteoarthritis type  Stable. She's on 2700 mg daily of gabapentin, will trial a lower dose of 600 mg 3 times daily instead.   - meloxicam (MOBIC) 15 MG tablet  Dispense: 90 tablet; Refill: 0  - gabapentin (NEURONTIN) 300 MG capsule  Dispense: 810 capsule; Refill: 0    Essential hypertension, benign  Stable. Lab today. Refills given.  - hydrochlorothiazide (HYDRODIURIL) 25 MG tablet  Dispense: 90 tablet; Refill: 0  - losartan (COZAAR) 100 MG tablet  Dispense: 90 tablet; Refill: 3  - Comprehensive metabolic panel (BMP + Alb, Alk Phos, ALT, AST, Total. Bili, TP)  - Comprehensive metabolic panel (BMP + Alb, Alk Phos, ALT, AST, Total. Bili, TP)    Hyperlipidemia, unspecified hyperlipidemia type  - Lipid panel reflex to direct LDL Non-fasting  - atorvastatin (LIPITOR) 40 MG tablet  Dispense: 90 tablet; Refill: 3  - Lipid panel reflex to direct LDL Non-fasting    Stage 3a chronic kidney disease (H)  Due for lab.  - Albumin Random Urine Quantitative with Creat Ratio  - Albumin Random Urine Quantitative with Creat Ratio    Sicca syndrome (H24)  Not a concern.     Type 2 diabetes mellitus with stage 3a chronic kidney disease, without long-term current use of insulin (H)  Due for lab today. On metformin only.   - HEMOGLOBIN A1C  - Comprehensive metabolic panel (BMP + Alb, Alk Phos, ALT, AST, Total. Bili, TP)  - HEMOGLOBIN A1C  - Comprehensive metabolic panel (BMP + Alb, Alk Phos, ALT, AST, Total. Bili, TP)    Bowing deformity of lower leg  No pain. Could be loss of cartilage/arthritis. Monitor for now.       The longitudinal plan of care for the diagnosis(es)/condition(s) as documented were addressed during this visit. Due to the added complexity in care, I will continue to support Wendy in the subsequent management and with ongoing continuity of care.        Subjective   Wendy is a 83 year old, presenting for the following health issues:  Med check      3/28/2024  "    2:45 PM   Additional Questions   Roomed by ROBERT Matias CMA([facility])     History of Present Illness       Reason for visit:  Medication check and check up physical    She eats 2-3 servings of fruits and vegetables daily.She consumes 1 sweetened beverage(s) daily.She exercises with enough effort to increase her heart rate 20 to 29 minutes per day.  She exercises with enough effort to increase her heart rate 4 days per week.   She is taking medications regularly.           Review of Systems  Constitutional, HEENT, cardiovascular, pulmonary, gi and gu systems are negative, except as otherwise noted.      Objective    /61   Pulse 87   Temp 97.9  F (36.6  C) (Oral)   Resp 16   Ht 1.581 m (5' 2.25\")   Wt 61.7 kg (136 lb)   SpO2 97%   BMI 24.68 kg/m    Body mass index is 24.68 kg/m .  Physical Exam   GENERAL: alert and no distress  NECK: no adenopathy, no asymmetry, masses, or scars  RESP: lungs clear to auscultation - no rales, rhonchi or wheezes  CV: regular rate and rhythm, normal S1 S2, no S3 or S4, no murmur, click or rub, no peripheral edema  ABDOMEN: soft, nontender, no hepatosplenomegaly, no masses and bowel sounds normal  MS: no gross musculoskeletal defects noted, no edema, legs bowing in, knees unremarkable, no midline spinal pain, no hip pain.   NEURO: Normal strength and tone, mentation intact and speech normal  PSYCH: mentation appears normal, affect normal/bright    Office Visit on 09/29/2023   Component Date Value Ref Range Status    Hemoglobin A1C 09/29/2023 7.1 (H)  0.0 - 5.6 % Final    Normal <5.7%   Prediabetes 5.7-6.4%    Diabetes 6.5% or higher     Note: Adopted from ADA consensus guidelines.    Hold Specimen 09/29/2023 [facility]   Final    WBC Count 09/29/2023 8.7  4.0 - 11.0 10e3/uL Final    RBC Count 09/29/2023 4.11  3.80 - 5.20 10e6/uL Final    Hemoglobin 09/29/2023 12.7  11.7 - 15.7 g/dL Final    Hematocrit 09/29/2023 38.3  35.0 - 47.0 % Final    MCV 09/29/2023 93  78 - 100 fL Final    MCH " 09/29/2023 30.9  26.5 - 33.0 pg Final    MCHC 09/29/2023 33.2  31.5 - 36.5 g/dL Final    RDW 09/29/2023 12.2  10.0 - 15.0 % Final    Platelet Count 09/29/2023 303  150 - 450 10e3/uL Final    Sodium 09/29/2023 139  135 - 145 mmol/L Final    Reference intervals for this test were updated on 09/26/2023 to more accurately reflect our healthy population. There may be differences in the flagging of prior results with similar values performed with this method. Interpretation of those prior results can be made in the context of the updated reference intervals.     Potassium 09/29/2023 3.9  3.4 - 5.3 mmol/L Final    Carbon Dioxide (CO2) 09/29/2023 22  22 - 29 mmol/L Final    Anion Gap 09/29/2023 16 (H)  7 - 15 mmol/L Final    Urea Nitrogen 09/29/2023 30.5 (H)  8.0 - 23.0 mg/dL Final    Creatinine 09/29/2023 1.01 (H)  0.51 - 0.95 mg/dL Final    GFR Estimate 09/29/2023 55 (L)  >60 mL/min/1.73m2 Final    Calcium 09/29/2023 9.3  8.8 - 10.2 mg/dL Final    Chloride 09/29/2023 101  98 - 107 mmol/L Final    Glucose 09/29/2023 163 (H)  70 - 99 mg/dL Final    Alkaline Phosphatase 09/29/2023 77  35 - 104 U/L Final    AST 09/29/2023 18  0 - 45 U/L Final    Reference intervals for this test were updated on 6/12/2023 to more accurately reflect our healthy population. There may be differences in the flagging of prior results with similar values performed with this method. Interpretation of those prior results can be made in the context of the updated reference intervals.    ALT 09/29/2023 22  0 - 50 U/L Final    Reference intervals for this test were updated on 6/12/2023 to more accurately reflect our healthy population. There may be differences in the flagging of prior results with similar values performed with this method. Interpretation of those prior results can be made in the context of the updated reference intervals.      Protein Total 09/29/2023 6.7  6.4 - 8.3 g/dL Final    Albumin 09/29/2023 4.4  3.5 - 5.2 g/dL Final    Bilirubin  Total 09/29/2023 0.6  <=1.2 mg/dL Final           Signed Electronically by: Tsering Henson MD

## 2024-03-29 LAB
ALBUMIN SERPL BCG-MCNC: 4.5 G/DL (ref 3.5–5.2)
ALP SERPL-CCNC: 68 U/L (ref 40–150)
ALT SERPL W P-5'-P-CCNC: 25 U/L (ref 0–50)
ANION GAP SERPL CALCULATED.3IONS-SCNC: 13 MMOL/L (ref 7–15)
AST SERPL W P-5'-P-CCNC: 19 U/L (ref 0–45)
BILIRUB SERPL-MCNC: 0.8 MG/DL
BUN SERPL-MCNC: 24.5 MG/DL (ref 8–23)
CALCIUM SERPL-MCNC: 9.6 MG/DL (ref 8.8–10.2)
CHLORIDE SERPL-SCNC: 100 MMOL/L (ref 98–107)
CHOLEST SERPL-MCNC: 173 MG/DL
CREAT SERPL-MCNC: 1.06 MG/DL (ref 0.51–0.95)
DEPRECATED HCO3 PLAS-SCNC: 26 MMOL/L (ref 22–29)
EGFRCR SERPLBLD CKD-EPI 2021: 52 ML/MIN/1.73M2
FASTING STATUS PATIENT QL REPORTED: YES
GLUCOSE SERPL-MCNC: 176 MG/DL (ref 70–99)
HDLC SERPL-MCNC: 56 MG/DL
LDLC SERPL CALC-MCNC: 62 MG/DL
NONHDLC SERPL-MCNC: 117 MG/DL
POTASSIUM SERPL-SCNC: 3.9 MMOL/L (ref 3.4–5.3)
PROT SERPL-MCNC: 6.5 G/DL (ref 6.4–8.3)
SODIUM SERPL-SCNC: 139 MMOL/L (ref 135–145)
TRIGL SERPL-MCNC: 276 MG/DL

## 2024-03-29 PROCEDURE — 82043 UR ALBUMIN QUANTITATIVE: CPT | Performed by: FAMILY MEDICINE

## 2024-03-29 PROCEDURE — 82570 ASSAY OF URINE CREATININE: CPT | Performed by: FAMILY MEDICINE

## 2024-03-30 LAB
CREAT UR-MCNC: 69.4 MG/DL
MICROALBUMIN UR-MCNC: <12 MG/L
MICROALBUMIN/CREAT UR: NORMAL MG/G{CREAT}

## 2024-04-09 ENCOUNTER — TELEPHONE (OUTPATIENT)
Dept: FAMILY MEDICINE | Facility: CLINIC | Age: 84
End: 2024-04-09
Payer: COMMERCIAL

## 2024-04-09 NOTE — LETTER
April 9, 2024      Wendy Suárez  [address]   [facility] [address]        Dear ,    We are writing to inform you of your test results.    Your A1c is similar to before, at 7.1. continue with current dose of metformin without change. Your Kidney function is decreased but this is similar to before and has not worsened. Stay well hydrated. Otherwise rest of your labs look fine.       Resulted Orders   Lipid panel reflex to direct LDL Non-fasting   Result Value Ref Range    Cholesterol 173 <200 mg/dL    Triglycerides 276 (H) <150 mg/dL    Direct Measure HDL 56 >=50 mg/dL    LDL Cholesterol Calculated 62 <=100 mg/dL    Non HDL Cholesterol 117 <130 mg/dL    Patient Fasting > 8hrs? Yes     Narrative    Cholesterol  Desirable:  <200 mg/dL    Triglycerides  Normal:  Less than 150 mg/dL  Borderline High:  150-199 mg/dL  High:  200-499 mg/dL  Very High:  Greater than or equal to 500 mg/dL    Direct Measure HDL  Female:  Greater than or equal to 50 mg/dL   Male:  Greater than or equal to 40 mg/dL    LDL Cholesterol  Desirable:  <100mg/dL  Above Desirable:  100-129 mg/dL   Borderline High:  130-159 mg/dL   High:  160-189 mg/dL   Very High:  >= 190 mg/dL    Non HDL Cholesterol  Desirable:  130 mg/dL  Above Desirable:  130-159 mg/dL  Borderline High:  160-189 mg/dL  High:  190-219 mg/dL  Very High:  Greater than or equal to 220 mg/dL   Albumin Random Urine Quantitative with Creat Ratio   Result Value Ref Range    Creatinine Urine mg/dL 69.4 mg/dL      Comment:      The reference ranges have not been established in urine creatinine. The results should be integrated into the clinical context for interpretation.    Albumin Urine mg/L <12.0 mg/L      Comment:      The reference ranges have not been established in urine albumin. The results should be integrated into the clinical context for interpretation.    Albumin Urine mg/g Cr        Comment:      Unable to calculate, urine albumin and/or urine creatinine is  outside detectable limits.  Microalbuminuria is defined as an albumin:creatinine ratio of 17 to 299 for males and 25 to 299 for females. A ratio of albumin:creatinine of 300 or higher is indicative of overt proteinuria.  Due to biologic variability, positive results should be confirmed by a second, first-morning random or 24-hour timed urine specimen. If there is discrepancy, a third specimen is recommended. When 2 out of 3 results are in the microalbuminuria range, this is evidence for incipient nephropathy and warrants increased efforts at glucose control, blood pressure control, and institution of therapy with an angiotensin-converting-enzyme (ACE) inhibitor (if the patient can tolerate it).     HEMOGLOBIN A1C   Result Value Ref Range    Hemoglobin A1C 7.1 (H) 0.0 - 5.6 %      Comment:      Normal <5.7%   Prediabetes 5.7-6.4%    Diabetes 6.5% or higher     Note: Adopted from ADA consensus guidelines.   Comprehensive metabolic panel (BMP + Alb, Alk Phos, ALT, AST, Total. Bili, TP)   Result Value Ref Range    Sodium 139 135 - 145 mmol/L      Comment:      Reference intervals for this test were updated on 09/26/2023 to more accurately reflect our healthy population. There may be differences in the flagging of prior results with similar values performed with this method. Interpretation of those prior results can be made in the context of the updated reference intervals.     Potassium 3.9 3.4 - 5.3 mmol/L    Carbon Dioxide (CO2) 26 22 - 29 mmol/L    Anion Gap 13 7 - 15 mmol/L    Urea Nitrogen 24.5 (H) 8.0 - 23.0 mg/dL    Creatinine 1.06 (H) 0.51 - 0.95 mg/dL    GFR Estimate 52 (L) >60 mL/min/1.73m2    Calcium 9.6 8.8 - 10.2 mg/dL    Chloride 100 98 - 107 mmol/L    Glucose 176 (H) 70 - 99 mg/dL    Alkaline Phosphatase 68 40 - 150 U/L      Comment:      Reference intervals for this test were updated on 11/14/2023 to more accurately reflect our healthy population. There may be differences in the flagging of prior results  with similar values performed with this method. Interpretation of those prior results can be made in the context of the updated reference intervals.    AST 19 0 - 45 U/L      Comment:      Reference intervals for this test were updated on 6/12/2023 to more accurately reflect our healthy population. There may be differences in the flagging of prior results with similar values performed with this method. Interpretation of those prior results can be made in the context of the updated reference intervals.    ALT 25 0 - 50 U/L      Comment:      Reference intervals for this test were updated on 6/12/2023 to more accurately reflect our healthy population. There may be differences in the flagging of prior results with similar values performed with this method. Interpretation of those prior results can be made in the context of the updated reference intervals.      Protein Total 6.5 6.4 - 8.3 g/dL    Albumin 4.5 3.5 - 5.2 g/dL    Bilirubin Total 0.8 <=1.2 mg/dL       If you have any questions or concerns, please call the clinic at the number listed above.       Sincerely,

## 2024-04-09 NOTE — TELEPHONE ENCOUNTER
Spoke with patient and relayed message from provider below. Will send letter with results to patient     Marianne Jasso RN

## 2024-04-09 NOTE — TELEPHONE ENCOUNTER
----- Message from Tsering Henson MD sent at 4/8/2024  6:39 PM CDT -----  Please call pt: A1c is similar to before, at 7.1. continue with current dose of metformin without change. Kidney function is decreased but this is similar to before and has not worsened. Stay well hydrated. Otherwise rest of lab looks fine. Please let me know if she has questions. Please send result letter with above message as well.

## 2024-04-26 DIAGNOSIS — J45.20 MILD INTERMITTENT ASTHMA WITHOUT COMPLICATION: ICD-10-CM

## 2024-04-26 RX ORDER — ALBUTEROL SULFATE 90 UG/1
2 AEROSOL, METERED RESPIRATORY (INHALATION) EVERY 6 HOURS PRN
Qty: 18 G | Refills: 5 | Status: SHIPPED | OUTPATIENT
Start: 2024-04-26

## 2024-07-11 ENCOUNTER — PATIENT OUTREACH (OUTPATIENT)
Dept: CARE COORDINATION | Facility: CLINIC | Age: 84
End: 2024-07-11
Payer: COMMERCIAL

## 2024-08-15 ENCOUNTER — PATIENT OUTREACH (OUTPATIENT)
Dept: CARE COORDINATION | Facility: CLINIC | Age: 84
End: 2024-08-15
Payer: COMMERCIAL

## 2024-09-12 DIAGNOSIS — M19.042 OSTEOARTHRITIS OF BOTH HANDS, UNSPECIFIED OSTEOARTHRITIS TYPE: ICD-10-CM

## 2024-09-12 DIAGNOSIS — M19.041 OSTEOARTHRITIS OF BOTH HANDS, UNSPECIFIED OSTEOARTHRITIS TYPE: ICD-10-CM

## 2024-09-12 RX ORDER — MELOXICAM 15 MG/1
TABLET ORAL
Qty: 90 TABLET | Refills: 0 | Status: SHIPPED | OUTPATIENT
Start: 2024-09-12

## 2024-09-16 DIAGNOSIS — N18.31 TYPE 2 DIABETES MELLITUS WITH STAGE 3A CHRONIC KIDNEY DISEASE, WITHOUT LONG-TERM CURRENT USE OF INSULIN (H): ICD-10-CM

## 2024-09-16 DIAGNOSIS — E11.22 TYPE 2 DIABETES MELLITUS WITH STAGE 3A CHRONIC KIDNEY DISEASE, WITHOUT LONG-TERM CURRENT USE OF INSULIN (H): ICD-10-CM

## 2024-09-19 RX ORDER — METFORMIN HCL 500 MG
500 TABLET, EXTENDED RELEASE 24 HR ORAL 2 TIMES DAILY WITH MEALS
Qty: 180 TABLET | Refills: 0 | Status: SHIPPED | OUTPATIENT
Start: 2024-09-19

## 2024-10-03 DIAGNOSIS — G47.00 INSOMNIA, UNSPECIFIED TYPE: ICD-10-CM

## 2024-10-03 DIAGNOSIS — R51.9 NONINTRACTABLE HEADACHE, UNSPECIFIED CHRONICITY PATTERN, UNSPECIFIED HEADACHE TYPE: ICD-10-CM

## 2024-10-13 DIAGNOSIS — I10 ESSENTIAL HYPERTENSION, BENIGN: ICD-10-CM

## 2024-10-14 NOTE — TELEPHONE ENCOUNTER
Outgoing call to patient. Unable to leave VM. Requested medication should have ran out >90 days ago. Please call patient and inquire how long she has been out of this medication and how frequently she is taking it.

## 2024-10-15 NOTE — TELEPHONE ENCOUNTER
Called patient to inquire about gap in medication. Phone number is for [facility], no answer, unable to leave message    Allen Stevens RN

## 2024-10-16 NOTE — TELEPHONE ENCOUNTER
Attempted to call patient, unable to leave message. Please try to contact patient again or see notation below if she returns call. Thanks!    Charity Azul RN

## 2024-10-17 RX ORDER — HYDROCHLOROTHIAZIDE 25 MG/1
25 TABLET ORAL DAILY
Qty: 90 TABLET | Refills: 0 | Status: SHIPPED | OUTPATIENT
Start: 2024-10-17

## 2024-10-17 NOTE — TELEPHONE ENCOUNTER
No significant gap noted. Still needs PCP review due to GFR <60      Dispensed Days Supply Quantity Provider Pharmacy    Hydrochlorot 25 Mg  Tab [facility] 07/21/2024 90 90 each Tsering Henson MD [id number] - [facility]   Hydrochlorot 25 Mg  Tab [employer] 04/22/2024 90 90 each Tsering Henson MD [id number] - [place]   Hydrochlorot 25 Mg  Tab [employer] 12/24/2023 90 90 each Tsering Henson MD [id number] - [place]...         How do dispenses affect the score?

## 2024-10-25 ENCOUNTER — OFFICE VISIT (OUTPATIENT)
Dept: FAMILY MEDICINE | Facility: CLINIC | Age: 84
End: 2024-10-25
Payer: COMMERCIAL

## 2024-10-25 VITALS
SYSTOLIC BLOOD PRESSURE: 133 MMHG | HEART RATE: 84 BPM | RESPIRATION RATE: 20 BRPM | DIASTOLIC BLOOD PRESSURE: 56 MMHG | HEIGHT: 62 IN | TEMPERATURE: 98.2 F | BODY MASS INDEX: 25.1 KG/M2 | OXYGEN SATURATION: 92 % | WEIGHT: 136.4 LBS

## 2024-10-25 DIAGNOSIS — J45.30 MILD PERSISTENT ASTHMA WITHOUT COMPLICATION: ICD-10-CM

## 2024-10-25 DIAGNOSIS — R06.09 DYSPNEA ON EXERTION: ICD-10-CM

## 2024-10-25 DIAGNOSIS — N95.1 MENOPAUSAL SYNDROME (HOT FLASHES): ICD-10-CM

## 2024-10-25 DIAGNOSIS — N18.31 STAGE 3A CHRONIC KIDNEY DISEASE (H): ICD-10-CM

## 2024-10-25 DIAGNOSIS — Z78.0 POSTMENOPAUSAL STATUS: ICD-10-CM

## 2024-10-25 DIAGNOSIS — E78.5 HYPERLIPIDEMIA, UNSPECIFIED HYPERLIPIDEMIA TYPE: ICD-10-CM

## 2024-10-25 DIAGNOSIS — E11.22 TYPE 2 DIABETES MELLITUS WITH STAGE 3A CHRONIC KIDNEY DISEASE, WITHOUT LONG-TERM CURRENT USE OF INSULIN (H): ICD-10-CM

## 2024-10-25 DIAGNOSIS — N18.31 TYPE 2 DIABETES MELLITUS WITH STAGE 3A CHRONIC KIDNEY DISEASE, WITHOUT LONG-TERM CURRENT USE OF INSULIN (H): ICD-10-CM

## 2024-10-25 DIAGNOSIS — Z00.00 ENCOUNTER FOR MEDICARE ANNUAL WELLNESS EXAM: Primary | ICD-10-CM

## 2024-10-25 DIAGNOSIS — I10 ESSENTIAL HYPERTENSION, BENIGN: ICD-10-CM

## 2024-10-25 LAB
ERYTHROCYTE [DISTWIDTH] IN BLOOD BY AUTOMATED COUNT: 12.3 % (ref 10–15)
EST. AVERAGE GLUCOSE BLD GHB EST-MCNC: 151 MG/DL
HBA1C MFR BLD: 6.9 % (ref 0–5.6)
HCT VFR BLD AUTO: 39.3 % (ref 35–47)
HGB BLD-MCNC: 12.5 G/DL (ref 11.7–15.7)
MCH RBC QN AUTO: 29.3 PG (ref 26.5–33)
MCHC RBC AUTO-ENTMCNC: 31.8 G/DL (ref 31.5–36.5)
MCV RBC AUTO: 92 FL (ref 78–100)
PLATELET # BLD AUTO: 256 10E3/UL (ref 150–450)
RBC # BLD AUTO: 4.27 10E6/UL (ref 3.8–5.2)
WBC # BLD AUTO: 7.3 10E3/UL (ref 4–11)

## 2024-10-25 PROCEDURE — 99214 OFFICE O/P EST MOD 30 MIN: CPT | Mod: 25 | Performed by: FAMILY MEDICINE

## 2024-10-25 PROCEDURE — 84443 ASSAY THYROID STIM HORMONE: CPT | Performed by: FAMILY MEDICINE

## 2024-10-25 PROCEDURE — G0439 PPPS, SUBSEQ VISIT: HCPCS | Performed by: FAMILY MEDICINE

## 2024-10-25 PROCEDURE — 85027 COMPLETE CBC AUTOMATED: CPT | Performed by: FAMILY MEDICINE

## 2024-10-25 PROCEDURE — 36415 COLL VENOUS BLD VENIPUNCTURE: CPT | Performed by: FAMILY MEDICINE

## 2024-10-25 PROCEDURE — G0008 ADMIN INFLUENZA VIRUS VAC: HCPCS | Performed by: FAMILY MEDICINE

## 2024-10-25 PROCEDURE — 90662 IIV NO PRSV INCREASED AG IM: CPT | Performed by: FAMILY MEDICINE

## 2024-10-25 PROCEDURE — 80061 LIPID PANEL: CPT | Performed by: FAMILY MEDICINE

## 2024-10-25 PROCEDURE — 80053 COMPREHEN METABOLIC PANEL: CPT | Performed by: FAMILY MEDICINE

## 2024-10-25 PROCEDURE — 83036 HEMOGLOBIN GLYCOSYLATED A1C: CPT | Performed by: FAMILY MEDICINE

## 2024-10-25 SDOH — HEALTH STABILITY: PHYSICAL HEALTH: ON AVERAGE, HOW MANY DAYS PER WEEK DO YOU ENGAGE IN MODERATE TO STRENUOUS EXERCISE (LIKE A BRISK WALK)?: 7 DAYS

## 2024-10-25 ASSESSMENT — ASTHMA QUESTIONNAIRES

## 2024-10-25 ASSESSMENT — SOCIAL DETERMINANTS OF HEALTH (SDOH): HOW OFTEN DO YOU GET TOGETHER WITH FRIENDS OR RELATIVES?: MORE THAN THREE TIMES A WEEK

## 2024-10-25 NOTE — PROGRESS NOTES
Preventive Care Visit  [facility]  Tsering Henson MD, Family Medicine  Oct 25, 2024      Assessment & Plan     Encounter for Medicare annual wellness exam  Routine history and physical, updated in EMR.  Due for DEXA ordered for her.  Follow-up in 6 months.    Type 2 diabetes mellitus with stage 3a chronic kidney disease, without long-term current use of insulin (H)  Lab today.  Controlled.  On metformin.  Follow-up in 6 months  - HEMOGLOBIN A1C  - Comprehensive metabolic panel (BMP + Alb, Alk Phos, ALT, AST, Total. Bili, TP)  - HEMOGLOBIN A1C  - Comprehensive metabolic panel (BMP + Alb, Alk Phos, ALT, AST, Total. Bili, TP)    Stage 3a chronic kidney disease (H)  lab  - CBC with platelets  - Comprehensive metabolic panel (BMP + Alb, Alk Phos, ALT, AST, Total. Bili, TP)  - CBC with platelets  - Comprehensive metabolic panel (BMP + Alb, Alk Phos, ALT, AST, Total. Bili, TP)    Mild persistent asthma without complication  Stable.   - Comprehensive metabolic panel (BMP + Alb, Alk Phos, ALT, AST, Total. Bili, TP)  - Comprehensive metabolic panel (BMP + Alb, Alk Phos, ALT, AST, Total. Bili, TP)    Essential hypertension, benign  Stable.  - Comprehensive metabolic panel (BMP + Alb, Alk Phos, ALT, AST, Total. Bili, TP)  - Comprehensive metabolic panel (BMP + Alb, Alk Phos, ALT, AST, Total. Bili, TP)    Hyperlipidemia, unspecified hyperlipidemia type  Stable. Lab today.  - Lipid panel  - Comprehensive metabolic panel (BMP + Alb, Alk Phos, ALT, AST, Total. Bili, TP)  - Lipid panel  - Comprehensive metabolic panel (BMP + Alb, Alk Phos, ALT, AST, Total. Bili, TP)    Postmenopausal status  Due for DEXA  - DX Bone Density    Menopausal syndrome (hot flashes)  Was on HRT for many years. Will check TSH. Monitor for now.   - TSH with free T4 reflex  - TSH with free T4 reflex    Dyspnea on exertion  Has albuterol inhaler prn.   - TSH with free T4 reflex  - TSH with free T4 reflex      Patient has been  advised of split billing requirements and indicates understanding: Yes        Counseling  Appropriate preventive services were addressed with this patient via screening, questionnaire, or discussion as appropriate for fall prevention, nutrition, physical activity, Tobacco-use cessation, social engagement, weight loss and cognition.  Checklist reviewing preventive services available has been given to the patient.  Reviewed patient's diet, addressing concerns and/or questions.   She is at risk for psychosocial distress and has been provided with information to reduce risk.   Information on urinary incontinence and treatment options given to patient.           Kolby Nguyen is a 84 year old, presenting for the following:  Wellness Visit        10/25/2024     3:24 PM   Additional Questions   Roomed by Romina OROZCO LPN           HPI  Chief Complaint   Patient presents with    Wellness Visit             Health Care Directive  Patient does not have a Health Care Directive: Patient states has Advance Directive and will bring in a copy to clinic.      10/25/2024   General Health   How would you rate your overall physical health? Excellent   Feel stress (tense, anxious, or unable to sleep) Only a little      (!) STRESS CONCERN      10/25/2024   Nutrition   Diet: Regular (no restrictions)            10/25/2024   Exercise   Days per week of moderate/strenous exercise 7 days            10/25/2024   Social Factors   Frequency of gathering with friends or relatives More than three times a week   Worry food won't last until get money to buy more No   Food not last or not have enough money for food? No   Do you have housing? (Housing is defined as stable permanent housing and does not include staying ouside in a car, in a tent, in an abandoned building, in an overnight shelter, or couch-surfing.) Yes   Are you worried about losing your housing? No   Lack of transportation? No   Unable to get utilities (heat,electricity)? No             10/25/2024   Fall Risk   Fallen 2 or more times in the past year? No    Trouble with walking or balance? No        Patient-reported          10/25/2024   Activities of Daily Living- Home Safety   Needs help with the following daily activites None of the above   Safety concerns in the home None of the above            10/25/2024   Dental   Dentist two times every year? Yes            10/25/2024   Hearing Screening   Hearing concerns? None of the above            10/25/2024   Driving Risk Screening   Patient/family members have concerns about driving No            10/25/2024   General Alertness/Fatigue Screening   Have you been more tired than usual lately? No            10/25/2024   Urinary Incontinence Screening   Bothered by leaking urine in past 6 months Yes            10/25/2024   TB Screening   Were you born outside of the [place]? No            Today's PHQ-2 Score:       10/25/2024     3:24 PM   PHQ-2 ( 1999 [employer])   Q1: Little interest or pleasure in doing things 0    Q2: Feeling down, depressed or hopeless 0    PHQ-2 Score 0    Q1: Little interest or pleasure in doing things Not at all   Q2: Feeling down, depressed or hopeless Not at all   PHQ-2 Score 0       Patient-reported           10/25/2024   Substance Use   Alcohol more than 3/day or more than 7/wk No   Do you have a current opioid prescription? No   How severe/bad is pain from 1 to 10? 0/10 (No Pain)   Do you use any other substances recreationally? No        Social History     Tobacco Use    Smoking status: Former     Types: Cigarettes    Smokeless tobacco: Never    Tobacco comments:     quit 55 years ago, social only   Substance Use Topics    Alcohol use: Yes     Alcohol/week: 0.0 - 1.0 standard drinks of alcohol    Drug use: Never          Mammogram Screening - After age 74- determine frequency with patient based on health status, life expectancy and patient goals              Reviewed and updated as needed this visit by Provider                     Past Medical History:   Diagnosis Date    Asthma     high IgE, elevated FENO 115 highest    Cough     Hypertension      Past Surgical History:   Procedure Laterality Date    HYSTERECTOMY Bilateral     was not cancer, for fibroids    OOPHORECTOMY Bilateral      OB History    Para Term  AB Living   3 3 3 0 0 0   SAB IAB Ectopic Multiple Live Births   0 0 0 0 0      # Outcome Date GA Lbr Kapil/2nd Weight Sex Type Anes PTL Lv   3 Term            2 Term            1 Term              Lab work is in process  Current providers sharing in care for this patient include:  Patient Care Team:  Tsering Henson MD as PCP - General (Family Medicine)  Tsering Henson MD as Assigned PCP    The following health maintenance items are reviewed in Epic and correct as of today:  Health Maintenance   Topic Date Due    RSV VACCINE (1 - 1-dose 75+ series) Never done    ASTHMA ACTION PLAN  2022    DTAP/TDAP/TD IMMUNIZATION (3 - Td or Tdap) 2023    MEDICARE ANNUAL WELLNESS VISIT  2024    EYE EXAM  2024    INFLUENZA VACCINE (1) 2024    COVID-19 Vaccine ( -  season) 2024    A1C  2024    DIABETIC FOOT EXAM  2024    ANNUAL REVIEW OF HM ORDERS  2024    HEMOGLOBIN  2024    BMP  2025    LIPID  2025    MICROALBUMIN  2025    ASTHMA CONTROL TEST  2025    FALL RISK ASSESSMENT  10/25/2025    ADVANCE CARE PLANNING  2028    DEXA  10/06/2035    PHQ-2 (once per calendar year)  Completed    Pneumococcal Vaccine: 65+ Years  Completed    URINALYSIS  Completed    ZOSTER IMMUNIZATION  Completed    HPV IMMUNIZATION  Aged Out    MENINGITIS IMMUNIZATION  Aged Out    RSV MONOCLONAL ANTIBODY  Aged Out         Review of Systems  Constitutional, HEENT, cardiovascular, pulmonary, gi and gu systems are negative, except as otherwise noted.     Objective    Exam  /56   Pulse 84   Temp 98.2  F (36.8  C) (Oral)   Resp 20   Ht 1.581 m  "(5' 2.25\")   Wt 61.9 kg (136 lb 6.4 oz)   SpO2 92%   BMI 24.75 kg/m     Estimated body mass index is 24.75 kg/m  as calculated from the following:    Height as of this encounter: 1.581 m (5' 2.25\").    Weight as of this encounter: 61.9 kg (136 lb 6.4 oz).    Physical Exam  GENERAL: alert and no distress  EYES: Eyes grossly normal to inspection, PERRL and conjunctivae and sclerae normal  NECK: no adenopathy, no asymmetry, masses, or scars  RESP: lungs clear to auscultation - no rales, rhonchi or wheezes  CV: regular rate and rhythm, normal S1 S2, no S3 or S4, no murmur, click or rub, no peripheral edema  ABDOMEN: soft, nontender, no hepatosplenomegaly, no masses and bowel sounds normal  MS: no gross musculoskeletal defects noted, no edema  SKIN: no suspicious lesions or rashes  NEURO: Normal strength and tone, mentation intact and speech normal  PSYCH: mentation appears normal, affect normal/bright  Diabetic foot exam: normal DP and PT pulses, no trophic changes or ulcerative lesions, and reduced sensation at all toes.          10/25/2024   Mini Cog   Clock Draw Score 2 Normal   3 Item Recall 3 objects recalled   Mini Cog Total Score 5              Identified Health Risks:  I have reviewed Opioid Use Disorder and Substance Use Disorder risk factors and made any needed referrals.             Signed Electronically by: Tsering Henson MD    "

## 2024-10-25 NOTE — PATIENT INSTRUCTIONS
Patient Education   Preventive Care Advice   This is general advice given by our system to help you stay healthy. However, your care team may have specific advice just for you. Please talk to your care team about your preventive care needs.  Nutrition  Eat 5 or more servings of fruits and vegetables each day.  Try wheat bread, brown rice and whole grain pasta (instead of white bread, rice, and pasta).  Get enough calcium and vitamin D. Check the label on foods and aim for 100% of the RDA (recommended daily allowance).  Lifestyle  Exercise at least 150 minutes each week  (30 minutes a day, 5 days a week).  Do muscle strengthening activities 2 days a week. These help control your weight and prevent disease.  No smoking.  Wear sunscreen to prevent skin cancer.  Have a dental exam and cleaning every 6 months.  Yearly exams  See your health care team every year to talk about:  Any changes in your health.  Any medicines your care team has prescribed.  Preventive care, family planning, and ways to prevent chronic diseases.  Shots (vaccines)   HPV shots (up to age 26), if you've never had them before.  Hepatitis B shots (up to age 59), if you've never had them before.  COVID-19 shot: Get this shot when it's due.  Flu shot: Get a flu shot every year.  Tetanus shot: Get a tetanus shot every 10 years.  Pneumococcal, hepatitis A, and RSV shots: Ask your care team if you need these based on your risk.  Shingles shot (for age 50 and up)  General health tests  Diabetes screening:  Starting at age 35, Get screened for diabetes at least every 3 years.  If you are younger than age 35, ask your care team if you should be screened for diabetes.  Cholesterol test: At age 39, start having a cholesterol test every 5 years, or more often if advised.  Bone density scan (DEXA): At age 50, ask your care team if you should have this scan for osteoporosis (brittle bones).  Hepatitis C: Get tested at least once in your life.  STIs (sexually  transmitted infections)  Before age 24: Ask your care team if you should be screened for STIs.  After age 24: Get screened for STIs if you're at risk. You are at risk for STIs (including HIV) if:  You are sexually active with more than one person.  You don't use condoms every time.  You or a partner was diagnosed with a sexually transmitted infection.  If you are at risk for HIV, ask about PrEP medicine to prevent HIV.  Get tested for HIV at least once in your life, whether you are at risk for HIV or not.  Cancer screening tests  Cervical cancer screening: If you have a cervix, begin getting regular cervical cancer screening tests starting at age 21.  Breast cancer scan (mammogram): If you've ever had breasts, begin having regular mammograms starting at age 40. This is a scan to check for breast cancer.  Colon cancer screening: It is important to start screening for colon cancer at age 45.  Have a colonoscopy test every 10 years (or more often if you're at risk) Or, ask your provider about stool tests like a FIT test every year or Cologuard test every 3 years.  To learn more about your testing options, visit:   .  For help making a decision, visit:   https://bit.ly/cs65154.  Prostate cancer screening test: If you have a prostate, ask your care team if a prostate cancer screening test (PSA) at age 55 is right for you.  Lung cancer screening: If you are a current or former smoker ages 50 to 80, ask your care team if ongoing lung cancer screenings are right for you.  For informational purposes only. Not to replace the advice of your health care provider. Copyright   2023 [facility]. All rights reserved. Clinically reviewed by the [facility] Transitions Program. YaKlass [id number] - REV 01/24.  Nutrition for Older Adults: Care Instructions  Overview     Good nutrition is important at any age. But it is especially important for older adults. Eating healthy foods helps keep your body strong. And it can  help lower your risk for disease.  As you get older, your body needs more of certain nutrients. These include vitamin B12, calcium, and vitamin D. But it may be harder for you to get these and other important nutrients. This could be for many reasons. You may not feel as hungry as you used to. Or you could have problems with your teeth or mouth that make it hard to chew. Or you may not enjoy planning and preparing meals, especially if you live alone.  Talk with your doctor if you want help getting the most nutrition from what you eat. They may have you work with a dietitian to help you plan meals.  Follow-up care is a key part of your treatment and safety. Be sure to make and go to all appointments, and call your doctor if you are having problems. It's also a good idea to know your test results and keep a list of the medicines you take.  How can you care for yourself at home?  To stay healthy  Eat a variety of foods. The more you vary the foods you eat, the more vitamins, minerals, and other nutrients you get.  Ask your doctor if you should take a multivitamin. Choose one with about 100% of the daily value (DV) for vitamins and minerals. Do not take more than 100% of the daily value for any vitamin or mineral unless your doctor tells you to. Talk with your doctor if you are not sure which multivitamin is right for you.  Try to eat lots of fruits and vegetables. Fresh or frozen vegetables and fruits are healthy choices. Choose canned vegetables that have no salt added and fruits that are canned in their own juice or light syrup.  Include foods that are high in vitamin B12 in your diet. Good choices are fortified breakfast cereal, nonfat or low-fat milk and other dairy products, meat, poultry, fish, and eggs.  Get enough calcium and vitamin D. Good choices include nonfat or low-fat milk, cheese, and yogurt. Other good options are tofu, orange juice with added calcium, and some leafy green vegetables, such as pardeep  greens and kale. If you don't use milk products, talk to your doctor about calcium and vitamin D supplements.  Try to eat protein foods every day. Good choices include lean meat, fish, poultry, eggs, and cheese. Other good options are cooked beans, peanut butter, and nuts and seeds.  Choose whole grains for half of the grains you eat. Look for 100% whole wheat bread, whole-grain cereals, brown rice, and other whole grains.  If you have constipation  Eat high-fiber foods every day if you can. These include fruits, vegetables, cooked dried beans, and whole grains.  Drink plenty of fluids. If you have kidney, heart, or liver disease and have to limit fluids, talk with your doctor before you increase the amount of fluids you drink.  Ask your doctor if stool softeners may help keep your bowels regular.  If you have mouth problems that make chewing hard  Pick canned or cooked fruits and vegetables. These are often softer.  Chop or shred meat, poultry, and fish. Add sauce or gravy to the meat to help keep it moist.  Pick other protein foods that are soft. These include cheese, peanut butter, cooked beans, cottage cheese, and eggs.  If you have trouble shopping for yourself  Ask a local food store to deliver groceries to your home.  Contact your local area agency on aging and ask about resources that can help.  Ask a family member or neighbor to help you.  If you have trouble preparing meals  Try easier cooking methods such as using a slow cooker or microwave oven.  Let the grocery store do some of the work for you. Look for precut, washed, and ready-to-eat foods.  Take part in group meal programs. You can find these through senior citizen programs.  Have meals brought to your home. Your community may offer programs that deliver meals, such as Meals on Wheels. Or you could use an online meal delivery service.  If you are able, take a cooking class.  If your appetite is poor  Try to eat meals on a regular schedule. It may  "help to eat smaller meals more often throughout the day.  If you can, eat some meals with other people. You could ask family or friends to eat with you. Or you could take part in group meal programs offered in your community.  Ask your doctor if your medicines could cause appetite or taste problems. If so, ask about changing medicines.  Add spices and herbs to increase the flavor of food.  If you think you are depressed, ask your doctor for help. Depression can affect your appetite. And it can make it hard to do everyday activities like grocery shopping and making meals. Treatment can help.  When should you call for help?  Watch closely for changes in your health, and be sure to contact your doctor if you have any problems.  Where can you learn more?  Go to https://www.[employer].net/patiented  Enter L643 in the search box to learn more about \"Nutrition for Older Adults: Care Instructions.\"  Current as of: September 25, 2023  Content Version: 14.2 2024 [employer].   Care instructions adapted under license by your healthcare professional. If you have questions about a medical condition or this instruction, always ask your healthcare professional. [employer] disclaims any warranty or liability for your use of this information.    Bladder Training: Care Instructions  Your Care Instructions     Bladder training is used to treat urge incontinence and stress incontinence. Urge incontinence means that the need to urinate comes on so fast that you can't get to a toilet in time. Stress incontinence means that you leak urine because of pressure on your bladder. For example, it may happen when you laugh, cough, or lift something heavy.  Bladder training can increase how long you can wait before you have to urinate. It can also help your bladder hold more urine. And it can give you better control over the urge to urinate.  It is important to remember that bladder training takes a few weeks to a few " months to make a difference. You may not see results right away, but don't give up.  Follow-up care is a key part of your treatment and safety. Be sure to make and go to all appointments, and call your doctor if you are having problems. It's also a good idea to know your test results and keep a list of the medicines you take.  How can you care for yourself at home?  Work with your doctor to come up with a bladder training program that is right for you. You may use one or more of the following methods.  Delayed urination  In the beginning, try to keep from urinating for 5 minutes after you first feel the need to go.  While you wait, take deep, slow breaths to relax. Kegel exercises can also help you delay the need to go to the bathroom.  After some practice, when you can easily wait 5 minutes to urinate, try to wait 10 minutes before you urinate.  Slowly increase the waiting period until you are able to control when you have to urinate.  Scheduled urination  Empty your bladder when you first wake up in the morning.  Schedule times throughout the day when you will urinate.  Start by going to the bathroom every hour, even if you don't need to go.  Slowly increase the time between trips to the bathroom.  When you have found a schedule that works well for you, keep doing it.  If you wake up during the night and have to urinate, do it. Apply your schedule to waking hours only.  Kegel exercises  These tighten and strengthen pelvic muscles, which can help you control the flow of urine. (If doing these exercises causes pain, stop doing them and talk with your doctor.) To do Kegel exercises:  Squeeze your muscles as if you were trying not to pass gas. Or squeeze your muscles as if you were stopping the flow of urine. Your belly, legs, and buttocks shouldn't move.  Hold the squeeze for 3 seconds, then relax for 5 to 10 seconds.  Start with 3 seconds, then add 1 second each week until you are able to squeeze for 10  "seconds.  Repeat the exercise 10 times a session. Do 3 to 8 sessions a day.  When should you call for help?  Watch closely for changes in your health, and be sure to contact your doctor if:    Your incontinence is getting worse.     You do not get better as expected.   Where can you learn more?  Go to https://www.[employer].net/patiented  Enter V684 in the search box to learn more about \"Bladder Training: Care Instructions.\"  Current as of: November 15, 2023  Content Version: 14.2 2024 [facility].   Care instructions adapted under license by your healthcare professional. If you have questions about a medical condition or this instruction, always ask your healthcare professional. [employer] disclaims any warranty or liability for your use of this information.       "

## 2024-10-26 LAB
ALBUMIN SERPL BCG-MCNC: 4.2 G/DL (ref 3.5–5.2)
ALP SERPL-CCNC: 147 U/L (ref 40–150)
ALT SERPL W P-5'-P-CCNC: 25 U/L (ref 0–50)
ANION GAP SERPL CALCULATED.3IONS-SCNC: 6 MMOL/L (ref 7–15)
AST SERPL W P-5'-P-CCNC: 17 U/L (ref 0–45)
BILIRUB SERPL-MCNC: 0.5 MG/DL
BUN SERPL-MCNC: 30.4 MG/DL (ref 8–23)
CALCIUM SERPL-MCNC: 9.2 MG/DL (ref 8.8–10.4)
CHLORIDE SERPL-SCNC: 101 MMOL/L (ref 98–107)
CHOLEST SERPL-MCNC: 176 MG/DL
CREAT SERPL-MCNC: 1.11 MG/DL (ref 0.51–0.95)
EGFRCR SERPLBLD CKD-EPI 2021: 48 ML/MIN/1.73M2
FASTING STATUS PATIENT QL REPORTED: NO
FASTING STATUS PATIENT QL REPORTED: NO
GLUCOSE SERPL-MCNC: 129 MG/DL (ref 70–99)
HCO3 SERPL-SCNC: 32 MMOL/L (ref 22–29)
HDLC SERPL-MCNC: 57 MG/DL
LDLC SERPL CALC-MCNC: 52 MG/DL
NONHDLC SERPL-MCNC: 119 MG/DL
POTASSIUM SERPL-SCNC: 4.2 MMOL/L (ref 3.4–5.3)
PROT SERPL-MCNC: 6.4 G/DL (ref 6.4–8.3)
SODIUM SERPL-SCNC: 139 MMOL/L (ref 135–145)
TRIGL SERPL-MCNC: 333 MG/DL
TSH SERPL DL<=0.005 MIU/L-ACNC: 3.54 UIU/ML (ref 0.3–4.2)

## 2024-10-28 ENCOUNTER — PATIENT OUTREACH (OUTPATIENT)
Dept: CARE COORDINATION | Facility: CLINIC | Age: 84
End: 2024-10-28
Payer: COMMERCIAL

## 2024-10-30 ENCOUNTER — TELEPHONE (OUTPATIENT)
Dept: FAMILY MEDICINE | Facility: CLINIC | Age: 84
End: 2024-10-30
Payer: COMMERCIAL

## 2024-10-30 NOTE — TELEPHONE ENCOUNTER
Called patient to discuss results message below. Patient verbalized understanding.    Allen Stevens, YUMIKO Henson, Tsering Hdez MD  [facility] - Primary Care  Please call pt: lab overall looks fine. She has decreased kidney function but this is chronic, most likely due to diabetes, hypertension, high cholesterol. No change in med management. Make sure she stays well hydrated. Her diabetes is pretty well controlled with A1c of 6.9, better than previously checked. Follow up in 6-7 months as discussed. Please let me know if she has question.  
no

## 2024-11-10 DIAGNOSIS — R05.8 DRY COUGH: ICD-10-CM

## 2024-11-10 DIAGNOSIS — R13.19 ESOPHAGEAL DYSPHAGIA: ICD-10-CM

## 2024-11-16 DIAGNOSIS — R51.9 NONINTRACTABLE HEADACHE, UNSPECIFIED CHRONICITY PATTERN, UNSPECIFIED HEADACHE TYPE: ICD-10-CM

## 2024-11-16 DIAGNOSIS — G47.00 INSOMNIA, UNSPECIFIED TYPE: ICD-10-CM

## 2024-11-18 RX ORDER — AMITRIPTYLINE HYDROCHLORIDE 10 MG/1
10 TABLET ORAL AT BEDTIME
Qty: 90 TABLET | Refills: 2 | Status: SHIPPED | OUTPATIENT
Start: 2024-11-18

## 2024-12-08 DIAGNOSIS — M19.041 OSTEOARTHRITIS OF BOTH HANDS, UNSPECIFIED OSTEOARTHRITIS TYPE: ICD-10-CM

## 2024-12-08 DIAGNOSIS — M19.042 OSTEOARTHRITIS OF BOTH HANDS, UNSPECIFIED OSTEOARTHRITIS TYPE: ICD-10-CM

## 2024-12-10 DIAGNOSIS — I10 ESSENTIAL HYPERTENSION, BENIGN: ICD-10-CM

## 2024-12-10 RX ORDER — GABAPENTIN 300 MG/1
600 CAPSULE ORAL 3 TIMES DAILY
Qty: 810 CAPSULE | Refills: 0 | Status: SHIPPED | OUTPATIENT
Start: 2024-12-10

## 2024-12-11 RX ORDER — HYDROCHLOROTHIAZIDE 25 MG/1
25 TABLET ORAL DAILY
Qty: 90 TABLET | Refills: 0 | Status: SHIPPED | OUTPATIENT
Start: 2024-12-11

## 2024-12-16 ENCOUNTER — HOSPITAL ENCOUNTER (OUTPATIENT)
Dept: BONE DENSITY | Facility: HOSPITAL | Age: 84
Discharge: HOME | End: 2024-12-16
Attending: FAMILY MEDICINE | Admitting: FAMILY MEDICINE
Payer: COMMERCIAL

## 2024-12-16 DIAGNOSIS — Z78.0 POSTMENOPAUSAL STATUS: ICD-10-CM

## 2024-12-16 PROCEDURE — 77091 TBS TECHL CALCULATION ONLY: CPT

## 2024-12-16 PROCEDURE — 77080 DXA BONE DENSITY AXIAL: CPT

## 2025-01-02 DIAGNOSIS — R51.9 NONINTRACTABLE HEADACHE, UNSPECIFIED CHRONICITY PATTERN, UNSPECIFIED HEADACHE TYPE: ICD-10-CM

## 2025-01-02 DIAGNOSIS — G47.00 INSOMNIA, UNSPECIFIED TYPE: ICD-10-CM

## 2025-01-06 ENCOUNTER — TRANSFERRED RECORDS (OUTPATIENT)
Dept: HEALTH INFORMATION MANAGEMENT | Facility: CLINIC | Age: 85
End: 2025-01-06
Payer: COMMERCIAL

## 2025-03-05 ENCOUNTER — ANCILLARY PROCEDURE (OUTPATIENT)
Dept: GENERAL RADIOLOGY | Facility: CLINIC | Age: 85
End: 2025-03-05
Attending: FAMILY MEDICINE
Payer: COMMERCIAL

## 2025-03-05 ENCOUNTER — OFFICE VISIT (OUTPATIENT)
Dept: FAMILY MEDICINE | Facility: CLINIC | Age: 85
End: 2025-03-05
Payer: COMMERCIAL

## 2025-03-05 VITALS
HEIGHT: 62 IN | BODY MASS INDEX: 25.44 KG/M2 | WEIGHT: 138.25 LBS | OXYGEN SATURATION: 95 % | HEART RATE: 80 BPM | DIASTOLIC BLOOD PRESSURE: 46 MMHG | RESPIRATION RATE: 16 BRPM | TEMPERATURE: 98.5 F | SYSTOLIC BLOOD PRESSURE: 118 MMHG

## 2025-03-05 DIAGNOSIS — R05.9 COUGH, UNSPECIFIED TYPE: Primary | ICD-10-CM

## 2025-03-05 DIAGNOSIS — J40 BRONCHITIS: ICD-10-CM

## 2025-03-05 DIAGNOSIS — R05.9 COUGH, UNSPECIFIED TYPE: ICD-10-CM

## 2025-03-05 LAB
BASOPHILS # BLD AUTO: 0.1 10E3/UL (ref 0–0.2)
BASOPHILS NFR BLD AUTO: 1 %
EOSINOPHIL # BLD AUTO: 0.9 10E3/UL (ref 0–0.7)
EOSINOPHIL NFR BLD AUTO: 11 %
ERYTHROCYTE [DISTWIDTH] IN BLOOD BY AUTOMATED COUNT: 12.6 % (ref 10–15)
HCT VFR BLD AUTO: 36.3 % (ref 35–47)
HGB BLD-MCNC: 11.8 G/DL (ref 11.7–15.7)
HOLD SPECIMEN: NORMAL
IMM GRANULOCYTES # BLD: 0 10E3/UL
IMM GRANULOCYTES NFR BLD: 0 %
LYMPHOCYTES # BLD AUTO: 1.7 10E3/UL (ref 0.8–5.3)
LYMPHOCYTES NFR BLD AUTO: 22 %
MCH RBC QN AUTO: 29.7 PG (ref 26.5–33)
MCHC RBC AUTO-ENTMCNC: 32.5 G/DL (ref 31.5–36.5)
MCV RBC AUTO: 91 FL (ref 78–100)
MONOCYTES # BLD AUTO: 0.5 10E3/UL (ref 0–1.3)
MONOCYTES NFR BLD AUTO: 6 %
NEUTROPHILS # BLD AUTO: 4.5 10E3/UL (ref 1.6–8.3)
NEUTROPHILS NFR BLD AUTO: 59 %
PLATELET # BLD AUTO: 251 10E3/UL (ref 150–450)
RBC # BLD AUTO: 3.97 10E6/UL (ref 3.8–5.2)
WBC # BLD AUTO: 7.7 10E3/UL (ref 4–11)

## 2025-03-05 PROCEDURE — 3074F SYST BP LT 130 MM HG: CPT | Performed by: FAMILY MEDICINE

## 2025-03-05 PROCEDURE — 71046 X-RAY EXAM CHEST 2 VIEWS: CPT | Mod: TC | Performed by: RADIOLOGY

## 2025-03-05 PROCEDURE — 85025 COMPLETE CBC W/AUTO DIFF WBC: CPT | Performed by: FAMILY MEDICINE

## 2025-03-05 PROCEDURE — 99214 OFFICE O/P EST MOD 30 MIN: CPT | Performed by: FAMILY MEDICINE

## 2025-03-05 PROCEDURE — 3078F DIAST BP <80 MM HG: CPT | Performed by: FAMILY MEDICINE

## 2025-03-05 PROCEDURE — 36415 COLL VENOUS BLD VENIPUNCTURE: CPT | Performed by: FAMILY MEDICINE

## 2025-03-05 RX ORDER — PREDNISONE 20 MG/1
20 TABLET ORAL DAILY
Qty: 5 TABLET | Refills: 0 | Status: SHIPPED | OUTPATIENT
Start: 2025-03-05

## 2025-03-05 ASSESSMENT — ENCOUNTER SYMPTOMS: COUGH: 1

## 2025-03-05 NOTE — PROGRESS NOTES
"  Assessment & Plan     Cough, unspecified type  White blood cell count normal no shift or neutrophil slightly elevation of eosinophils  No focal infiltrate with small amounts of atelectasis noted on chest x-ray  Suspicious for bronchitis versus lingering inflammatory upper respiratory viral infection causing patient's symptoms and lingering cough  Discussed prednisone 20 mg daily to help suppress the cough and strengthen the lungs with her history of asthma  Do not feel antibiotics are needed at this time  - XR Chest 2 Views  - CBC with platelets and differential            BMI  Estimated body mass index is 25.08 kg/m  as calculated from the following:    Height as of this encounter: 1.581 m (5' 2.25\").    Weight as of this encounter: 62.7 kg (138 lb 4 oz).             Kolby Nguyen is a 84 year old, presenting for the following health issues:  Cough      3/5/2025    12:18 PM   Additional Questions   Roomed by Liliana DOBBS CMA   Accompanied by Self     History of Present Illness       Reason for visit:  I have had a cold for 4 week and now wheezing  Symptom onset:  3-4 weeks ago   She is taking medications regularly.      Here for evaluation of a cough.  Patient is an 84-year-old female with diabetes and asthma that has had symptoms for greater than 3 weeks in duration.  Patient has had symptoms for 3 to 4 weeks in duration.  No other sick contacts at home.  Patient did feel that the had some improvement originally but now cough has been getting worse again.  History of asthma and previously with her pulmonologist had been treated for similar symptoms with azithromycin and prednisone but her current prescription for that was outdated.  Denies any fevers or chills at home.  On examination today she does have rhonchi in the left lower lobe no wheezing.  Chest x-ray and lab work obtained.  Chest x-ray essentially clear and labs normal showing no shift of white blood cells or neutrophils.  Eosinophils are slightly " "elevated.  Will treat with prednisone 20 mg x 5 days.      Objective    /46 (BP Location: Left arm, Patient Position: Sitting, Cuff Size: Adult Regular)   Pulse 80   Temp 98.5  F (36.9  C) (Oral)   Resp 16   Ht 1.581 m (5' 2.25\")   Wt 62.7 kg (138 lb 4 oz)   SpO2 95%   BMI 25.08 kg/m    Body mass index is 25.08 kg/m .  Physical Exam   GENERAL: alert and no distress  EYES: Eyes grossly normal to inspection, PERRL and conjunctivae and sclerae normal  RESP: Very minimal wheezing- but there is rhonchi in left lower lung field not clearing well with coughing  CV: regular rates and rhythm and no peripheral edema  MS: no gross musculoskeletal defects noted, no edema  NEURO: Normal strength and tone, mentation intact and speech normal  PSYCH: mentation appears normal, affect normal/bright            Signed Electronically by: Laith Hyde MD    "

## 2025-03-12 DIAGNOSIS — I10 ESSENTIAL HYPERTENSION, BENIGN: ICD-10-CM

## 2025-03-13 RX ORDER — HYDROCHLOROTHIAZIDE 25 MG/1
25 TABLET ORAL DAILY
Qty: 90 TABLET | Refills: 0 | Status: SHIPPED | OUTPATIENT
Start: 2025-03-13

## 2025-03-20 DIAGNOSIS — E11.22 TYPE 2 DIABETES MELLITUS WITH STAGE 3A CHRONIC KIDNEY DISEASE, WITHOUT LONG-TERM CURRENT USE OF INSULIN (H): ICD-10-CM

## 2025-03-20 DIAGNOSIS — N18.31 TYPE 2 DIABETES MELLITUS WITH STAGE 3A CHRONIC KIDNEY DISEASE, WITHOUT LONG-TERM CURRENT USE OF INSULIN (H): ICD-10-CM

## 2025-03-20 RX ORDER — METFORMIN HYDROCHLORIDE 500 MG/1
500 TABLET, EXTENDED RELEASE ORAL 2 TIMES DAILY WITH MEALS
Qty: 180 TABLET | Refills: 1 | Status: SHIPPED | OUTPATIENT
Start: 2025-03-20

## 2025-04-16 DIAGNOSIS — E78.5 HYPERLIPIDEMIA, UNSPECIFIED HYPERLIPIDEMIA TYPE: ICD-10-CM

## 2025-04-16 RX ORDER — ATORVASTATIN CALCIUM 40 MG/1
40 TABLET, FILM COATED ORAL DAILY
Qty: 90 TABLET | Refills: 2 | Status: SHIPPED | OUTPATIENT
Start: 2025-04-16

## 2025-04-21 DIAGNOSIS — M19.041 OSTEOARTHRITIS OF BOTH HANDS, UNSPECIFIED OSTEOARTHRITIS TYPE: ICD-10-CM

## 2025-04-21 DIAGNOSIS — M19.042 OSTEOARTHRITIS OF BOTH HANDS, UNSPECIFIED OSTEOARTHRITIS TYPE: ICD-10-CM

## 2025-04-22 RX ORDER — GABAPENTIN 300 MG/1
600 CAPSULE ORAL 3 TIMES DAILY
Qty: 810 CAPSULE | Refills: 0 | Status: SHIPPED | OUTPATIENT
Start: 2025-04-22

## 2025-05-24 ENCOUNTER — HOSPITAL ENCOUNTER (OUTPATIENT)
Dept: GENERAL RADIOLOGY | Facility: HOSPITAL | Age: 85
Discharge: HOME | End: 2025-05-24
Attending: NURSE PRACTITIONER | Admitting: NURSE PRACTITIONER
Payer: COMMERCIAL

## 2025-05-24 ENCOUNTER — OFFICE VISIT (OUTPATIENT)
Dept: URGENT CARE | Facility: URGENT CARE | Age: 85
End: 2025-05-24
Payer: COMMERCIAL

## 2025-05-24 VITALS
TEMPERATURE: 97.5 F | BODY MASS INDEX: 24.17 KG/M2 | OXYGEN SATURATION: 100 % | RESPIRATION RATE: 20 BRPM | HEART RATE: 92 BPM | SYSTOLIC BLOOD PRESSURE: 133 MMHG | WEIGHT: 133.2 LBS | DIASTOLIC BLOOD PRESSURE: 57 MMHG

## 2025-05-24 DIAGNOSIS — R05.1 ACUTE COUGH: Primary | ICD-10-CM

## 2025-05-24 DIAGNOSIS — R05.1 ACUTE COUGH: ICD-10-CM

## 2025-05-24 DIAGNOSIS — J18.9 PNEUMONIA OF RIGHT LOWER LOBE DUE TO INFECTIOUS ORGANISM: ICD-10-CM

## 2025-05-24 LAB — RADIOLOGIST FLAGS: NORMAL

## 2025-05-24 PROCEDURE — 99214 OFFICE O/P EST MOD 30 MIN: CPT | Performed by: NURSE PRACTITIONER

## 2025-05-24 PROCEDURE — 71046 X-RAY EXAM CHEST 2 VIEWS: CPT

## 2025-05-24 PROCEDURE — 3078F DIAST BP <80 MM HG: CPT | Performed by: NURSE PRACTITIONER

## 2025-05-24 PROCEDURE — 3075F SYST BP GE 130 - 139MM HG: CPT | Performed by: NURSE PRACTITIONER

## 2025-05-24 RX ORDER — CEFPODOXIME PROXETIL 200 MG/1
200 TABLET, FILM COATED ORAL 2 TIMES DAILY
Qty: 14 TABLET | Refills: 0 | Status: SHIPPED | OUTPATIENT
Start: 2025-05-24 | End: 2025-05-31

## 2025-05-24 RX ORDER — AZITHROMYCIN 250 MG/1
TABLET, FILM COATED ORAL
Qty: 6 TABLET | Refills: 0 | Status: SHIPPED | OUTPATIENT
Start: 2025-05-24 | End: 2025-05-29

## 2025-05-24 RX ORDER — ALBUTEROL SULFATE 90 UG/1
2 INHALANT RESPIRATORY (INHALATION) EVERY 6 HOURS PRN
Qty: 18 G | Refills: 0 | Status: SHIPPED | OUTPATIENT
Start: 2025-05-24

## 2025-05-24 NOTE — PROGRESS NOTES
"  Assessment & Plan   Problem List Items Addressed This Visit       Cough - Primary    Relevant Medications    albuterol (VENTOLIN HFA) 108 (90 Base) MCG/ACT inhaler    Other Relevant Orders    XR Chest 2 Views (Completed)     Other Visit Diagnoses         Pneumonia of right lower lobe due to infectious organism        Relevant Medications    albuterol (VENTOLIN HFA) 108 (90 Base) MCG/ACT inhaler    azithromycin (ZITHROMAX) 250 MG tablet    cefpodoxime (VANTIN) 200 MG tablet        Reviewed results with patient she started on antibiotics inhaler has been prescribed recommend follow-up in primary care next week also advised patient she will need repeat chest x-ray upon completion of antibiotics to ensure resolution. Patient advised if symptoms persist, worsen or new symptoms arise they are to seek medical care.  All patients questions addressed. Patient verbalized understanding and agreement with plan.            BMI  Estimated body mass index is 24.17 kg/m  as calculated from the following:    Height as of 3/5/25: 1.581 m (5' 2.25\").    Weight as of this encounter: 60.4 kg (133 lb 3.2 oz).           Kolby Nguyen is a 84 year old, presenting for the following health issues:  URI (This is a new infection Getting worse. Now has muscle weakness and light headedness. Does not have a inhaler because her insurance does not cover the current prescription )        5/24/2025     2:36 PM   Additional Questions   Roomed by jaron tello cma     HPI                Review of Systems  Constitutional, HEENT, cardiovascular, pulmonary, GI, , musculoskeletal, neuro, skin, endocrine and psych systems are negative, except as otherwise noted.      Objective    /57 (BP Location: Right arm, Patient Position: Sitting, Cuff Size: Adult Regular)   Pulse 92   Temp 97.5  F (36.4  C) (Tympanic)   Resp 20   Wt 60.4 kg (133 lb 3.2 oz)   SpO2 100%   BMI 24.17 kg/m    Body mass index is 24.17 kg/m .  Physical Exam   GENERAL: alert " and no distress  EYES: Eyes grossly normal to inspection,  conjunctivae and sclerae normal  HENT: ear canals and TM's normal, nose and mouth without ulcers or lesions  NECK: no adenopathy  RESP: Respirations are regular nonlabored patient has rhonchi right lower lobe along with wheezing not cleared with cough  CV: regular rate and rhythm, normal S1 S2  PSYCH: mentation appears normal, affect normal/bright    No results found for this or any previous visit (from the past 24 hours).      XR Chest 2 Views  Narrative: EXAM: XR CHEST 2 VIEWS  LOCATION: [facility]  DATE: 5/24/2025    INDICATION:  Acute cough  COMPARISON: 3/5/2025  Impression: IMPRESSION: Small lingular consolidation likely represents pneumonia. Recommend a follow-up chest radiograph after therapy to document resolution.    Small right pleural effusion. The cardiac silhouette and pulmonary vasculature are normal.    [Recommend Follow Up: Follow-up chest radiograph in 4-6 weeks]    This report will be copied to the [facility] to ensure a provider acknowledges the finding.       Signed Electronically by: Lidia Al NP

## 2025-06-02 DIAGNOSIS — M19.041 OSTEOARTHRITIS OF BOTH HANDS, UNSPECIFIED OSTEOARTHRITIS TYPE: ICD-10-CM

## 2025-06-02 DIAGNOSIS — M19.042 OSTEOARTHRITIS OF BOTH HANDS, UNSPECIFIED OSTEOARTHRITIS TYPE: ICD-10-CM

## 2025-06-03 RX ORDER — MELOXICAM 15 MG/1
15 TABLET ORAL
Qty: 90 TABLET | Refills: 0 | Status: SHIPPED | OUTPATIENT
Start: 2025-06-03

## 2025-06-05 ENCOUNTER — OFFICE VISIT (OUTPATIENT)
Dept: FAMILY MEDICINE | Facility: CLINIC | Age: 85
End: 2025-06-05
Payer: COMMERCIAL

## 2025-06-05 VITALS
HEIGHT: 62 IN | RESPIRATION RATE: 16 BRPM | TEMPERATURE: 98.3 F | BODY MASS INDEX: 24.13 KG/M2 | SYSTOLIC BLOOD PRESSURE: 125 MMHG | HEART RATE: 84 BPM | WEIGHT: 131.13 LBS | DIASTOLIC BLOOD PRESSURE: 40 MMHG | OXYGEN SATURATION: 98 %

## 2025-06-05 DIAGNOSIS — N18.31 TYPE 2 DIABETES MELLITUS WITH STAGE 3A CHRONIC KIDNEY DISEASE, WITHOUT LONG-TERM CURRENT USE OF INSULIN (H): ICD-10-CM

## 2025-06-05 DIAGNOSIS — N18.31 STAGE 3A CHRONIC KIDNEY DISEASE (H): Primary | ICD-10-CM

## 2025-06-05 DIAGNOSIS — I10 ESSENTIAL HYPERTENSION, BENIGN: ICD-10-CM

## 2025-06-05 DIAGNOSIS — E11.22 TYPE 2 DIABETES MELLITUS WITH STAGE 3A CHRONIC KIDNEY DISEASE, WITHOUT LONG-TERM CURRENT USE OF INSULIN (H): ICD-10-CM

## 2025-06-05 DIAGNOSIS — J18.9 COMMUNITY ACQUIRED PNEUMONIA OF RIGHT LOWER LOBE OF LUNG: ICD-10-CM

## 2025-06-05 ASSESSMENT — ASTHMA QUESTIONNAIRES
QUESTION_4 LAST FOUR WEEKS HOW OFTEN HAVE YOU USED YOUR RESCUE INHALER OR NEBULIZER MEDICATION (SUCH AS ALBUTEROL): THREE OR MORE TIMES PER DAY
QUESTION_5 LAST FOUR WEEKS HOW WOULD YOU RATE YOUR ASTHMA CONTROL: WELL CONTROLLED
ACT_TOTALSCORE: 18
ACT_TOTALSCORE: 18
QUESTION_1 LAST FOUR WEEKS HOW MUCH OF THE TIME DID YOUR ASTHMA KEEP YOU FROM GETTING AS MUCH DONE AT WORK, SCHOOL OR AT HOME: NONE OF THE TIME
QUESTION_2 LAST FOUR WEEKS HOW OFTEN HAVE YOU HAD SHORTNESS OF BREATH: ONCE OR TWICE A WEEK
QUESTION_5 LAST FOUR WEEKS HOW WOULD YOU RATE YOUR ASTHMA CONTROL: WELL CONTROLLED
QUESTION_2 LAST FOUR WEEKS HOW OFTEN HAVE YOU HAD SHORTNESS OF BREATH: ONCE OR TWICE A WEEK
QUESTION_1 LAST FOUR WEEKS HOW MUCH OF THE TIME DID YOUR ASTHMA KEEP YOU FROM GETTING AS MUCH DONE AT WORK, SCHOOL OR AT HOME: NONE OF THE TIME
QUESTION_3 LAST FOUR WEEKS HOW OFTEN DID YOUR ASTHMA SYMPTOMS (WHEEZING, COUGHING, SHORTNESS OF BREATH, CHEST TIGHTNESS OR PAIN) WAKE YOU UP AT NIGHT OR EARLIER THAN USUAL IN THE MORNING: ONCE OR TWICE
QUESTION_4 LAST FOUR WEEKS HOW OFTEN HAVE YOU USED YOUR RESCUE INHALER OR NEBULIZER MEDICATION (SUCH AS ALBUTEROL): THREE OR MORE TIMES PER DAY
QUESTION_3 LAST FOUR WEEKS HOW OFTEN DID YOUR ASTHMA SYMPTOMS (WHEEZING, COUGHING, SHORTNESS OF BREATH, CHEST TIGHTNESS OR PAIN) WAKE YOU UP AT NIGHT OR EARLIER THAN USUAL IN THE MORNING: ONCE OR TWICE

## 2025-06-05 NOTE — Clinical Note
Just an FYI.  You are seeing this patient in 1 week.  Saw her today for ER follow-up of pneumonia.  Symptoms are resolved per patient.  However does have slight Rales on right lower lobe still.  I have ordered her future chest x-ray per radiology recommendation for nurse only visits but I would recommend rechecking her lungs to verify these Rales have resolved.  Norberto

## 2025-06-05 NOTE — PROGRESS NOTES
Assessment & Plan     Community acquired pneumonia of right lower lobe of lung  Known history.  Recent x-ray showed signs of pneumonia however there was recommendation by the radiologist to recheck to verify clearance.  Does have a past history of smoking.  Asymptomatic at this point however does have ongoing Rales.  Given normal oxygen levels with normal pulse afebrile and normal respiratory rate, felt reasonable to forego any further treatment at this time and repeat x-ray in approximately 4 to 5 weeks.  Future ordered.  Does have an appointment with PCP in approximately 1 week.  Recommending rechecking pulmonary exam at this time.  If any new chest pain shortness of breath fevers or chills develop to return to clinic.  - XR Chest 2 Views; Future    Stage 3a chronic kidney disease (H)  Offered urine albumin today denied by patient plans on getting with PCP at next week    Type 2 diabetes mellitus with stage 3a chronic kidney disease, without long-term current use of insulin (H)  Offered A1c today denied by patient plans on getting this next week with PCP    Essential hypertension, benign  Blood pressure well-controlled today however there is a discrepancy between systolic and diastolic value.  Given age consider possible aortic regurgitation however no significant murmur noted on exam and she denies any symptoms suggesting aortic regurgitation that is significant.  Recommending monitoring this for now and consider echocardiogram in the future if persistent findings or new symptoms develop.        Kolby Nguyen is a 84 year old, presenting for the following health issues:  ER F/U        6/5/2025    12:40 PM   Additional Questions   Roomed by Liliana DOBBS CMA   Accompanied by Self     HPI        ED/UC Followup:    Facility:  [facility]  Date of visit: 5/24/25  Reason for visit: Pneumonia  Current Status: Feeling back to normal    Patient is a very pleasant 84-year-old female with a past history of  "stage IIIa chronic kidney disease, diabetes, asthma, hypertension, hyperlipidemia.  She presents today for follow-up from urgent care visit on 24 May where she was diagnosed with a right lower lobe pneumonia.  She was completed antibiotics appropriately.  This included cefpodoxime and azithromycin.  No side effects of medications.  Since completion of this antibiotic she has had no symptoms.  Denies any cough shortness of breath or wheezing.  No fevers or chills.  No chest pains.    Of note denies any dizziness lightheadedness.      Objective    /40 (BP Location: Left arm, Patient Position: Sitting, Cuff Size: Adult Regular)   Pulse 84   Temp 98.3  F (36.8  C) (Oral)   Resp 16   Ht 1.581 m (5' 2.25\")   Wt 59.5 kg (131 lb 2 oz)   SpO2 98%   BMI 23.79 kg/m    Body mass index is 23.79 kg/m .  Physical Exam   GENERAL: alert and no distress  RESP: no rhonchi, no wheezes, and rales R lower posterior  CV: regular rates and rhythm, normal S1 S2, no S3 or S4, and no murmur, click or rub  PSYCH: mentation appears normal, affect normal/bright          Signed Electronically by: Luis Smith PA-C  The longitudinal plan of care for the diagnosis(es)/condition(s) as documented were addressed during this visit. Due to the added complexity in care, will continue to support Wendy in the subsequent management and with ongoing continuity of care.    "

## 2025-06-12 ENCOUNTER — OFFICE VISIT (OUTPATIENT)
Dept: FAMILY MEDICINE | Facility: CLINIC | Age: 85
End: 2025-06-12
Payer: COMMERCIAL

## 2025-06-12 VITALS
HEIGHT: 63 IN | RESPIRATION RATE: 16 BRPM | OXYGEN SATURATION: 96 % | TEMPERATURE: 98.3 F | DIASTOLIC BLOOD PRESSURE: 42 MMHG | HEART RATE: 80 BPM | WEIGHT: 131.6 LBS | BODY MASS INDEX: 23.32 KG/M2 | SYSTOLIC BLOOD PRESSURE: 114 MMHG

## 2025-06-12 DIAGNOSIS — I10 ESSENTIAL HYPERTENSION, BENIGN: ICD-10-CM

## 2025-06-12 DIAGNOSIS — R29.898 HEAVY SENSATION OF LOWER EXTREMITY: ICD-10-CM

## 2025-06-12 DIAGNOSIS — N18.31 TYPE 2 DIABETES MELLITUS WITH STAGE 3A CHRONIC KIDNEY DISEASE, WITHOUT LONG-TERM CURRENT USE OF INSULIN (H): ICD-10-CM

## 2025-06-12 DIAGNOSIS — N18.31 STAGE 3A CHRONIC KIDNEY DISEASE (H): ICD-10-CM

## 2025-06-12 DIAGNOSIS — R93.89 ABNORMAL FINDINGS ON DIAGNOSTIC IMAGING OF OTHER SPECIFIED BODY STRUCTURES: ICD-10-CM

## 2025-06-12 DIAGNOSIS — E11.22 TYPE 2 DIABETES MELLITUS WITH STAGE 3A CHRONIC KIDNEY DISEASE, WITHOUT LONG-TERM CURRENT USE OF INSULIN (H): ICD-10-CM

## 2025-06-12 DIAGNOSIS — R09.89 RESPIRATORY CRACKLES: ICD-10-CM

## 2025-06-12 DIAGNOSIS — M54.16 LUMBAR RADICULOPATHY: ICD-10-CM

## 2025-06-12 DIAGNOSIS — M25.552 BILATERAL HIP PAIN: ICD-10-CM

## 2025-06-12 DIAGNOSIS — J18.9 COMMUNITY ACQUIRED PNEUMONIA OF RIGHT LOWER LOBE OF LUNG: Primary | ICD-10-CM

## 2025-06-12 DIAGNOSIS — M25.551 BILATERAL HIP PAIN: ICD-10-CM

## 2025-06-12 LAB
ERYTHROCYTE [DISTWIDTH] IN BLOOD BY AUTOMATED COUNT: 12.7 % (ref 10–15)
EST. AVERAGE GLUCOSE BLD GHB EST-MCNC: 163 MG/DL
HBA1C MFR BLD: 7.3 % (ref 0–5.6)
HCT VFR BLD AUTO: 36 % (ref 35–47)
HGB BLD-MCNC: 11.4 G/DL (ref 11.7–15.7)
MCH RBC QN AUTO: 29.1 PG (ref 26.5–33)
MCHC RBC AUTO-ENTMCNC: 31.7 G/DL (ref 31.5–36.5)
MCV RBC AUTO: 92 FL (ref 78–100)
PLATELET # BLD AUTO: 278 10E3/UL (ref 150–450)
RBC # BLD AUTO: 3.92 10E6/UL (ref 3.8–5.2)
WBC # BLD AUTO: 8.2 10E3/UL (ref 4–11)

## 2025-06-12 RX ORDER — HYDROCHLOROTHIAZIDE 25 MG/1
25 TABLET ORAL DAILY
Qty: 90 TABLET | Refills: 0 | Status: SHIPPED | OUTPATIENT
Start: 2025-06-12

## 2025-06-12 RX ORDER — PREDNISONE 20 MG/1
40 TABLET ORAL DAILY
Qty: 10 TABLET | Refills: 0 | Status: SHIPPED | OUTPATIENT
Start: 2025-06-12 | End: 2025-06-17

## 2025-06-12 RX ORDER — HYDROCHLOROTHIAZIDE 25 MG/1
25 TABLET ORAL DAILY
Qty: 90 TABLET | Refills: 0 | Status: SHIPPED | OUTPATIENT
Start: 2025-06-12 | End: 2025-06-12

## 2025-06-12 NOTE — PROGRESS NOTES
Assessment & Plan     Community acquired pneumonia of right lower lobe of lung  Repeated cxr, looks clear to me. Will prednisone for ongoing Rales.  - CBC with platelets  - Comprehensive metabolic panel (BMP + Alb, Alk Phos, ALT, AST, Total. Bili, TP)  - XR Chest 2 Views  - CBC with platelets  - Comprehensive metabolic panel (BMP + Alb, Alk Phos, ALT, AST, Total. Bili, TP)    Respiratory crackles  - predniSONE (DELTASONE) 20 MG tablet  Dispense: 10 tablet; Refill: 0    Type 2 diabetes mellitus with stage 3a chronic kidney disease, without long-term current use of insulin (H)  Once he has worsened a little bit to 7.3.  Monitor for now.  Continue metformin without change  - HEMOGLOBIN A1C  - HEMOGLOBIN A1C    Essential hypertension, benign  Stable.  Monitor for now    Stage 3a chronic kidney disease (H)  Due for lab work today  - Albumin Random Urine Quantitative with Creat Ratio  - Albumin Random Urine Quantitative with Creat Ratio    Heavy sensation of lower extremity  Lumbar radiculopathy  Lumbar spine obtained and personally reviewed.  Degenerative disease appreciated.  Awaiting blood work.  If unremarkable we will send her to spine clinic for further evaluation  - Vitamin B12  - TSH with free T4 reflex  - CK total  - Magnesium  - XR Lumbar Spine 2/3 Views  - Vitamin B12  - TSH with free T4 reflex  - CK total  - Magnesium    Abnormal findings on diagnostic imaging of other specified body structures  - TSH with free T4 reflex  - TSH with free T4 reflex    Bilateral hip pain  Personally reviewed.  Degenerative joint disease appreciated.  Monitor for now.  Could be part of the reason why she has heavy sensation of her lower extremity which could be explored after she is seen by spine.  - XR Lumbar Spine 2/3 Views              The longitudinal plan of care for the diagnosis(es)/condition(s) as documented were addressed during this visit. Due to the added complexity in care, I will continue to support Wendy in the  "subsequent management and with ongoing continuity of care.      Subjective   Wendy is a 84 year old, presenting for the following health issues:  No chief complaint on file.      6/12/2025     4:10 PM   Additional Questions   Roomed by ROBERT Matias CMA([facility])     History of Present Illness       Diabetes:   She presents for follow up of diabetes.    She is not checking blood glucose.         She has no concerns regarding her diabetes at this time.  She is having numbness in feet and weight loss.            Hyperlipidemia:  She presents for follow up of hyperlipidemia.   She is taking medication to lower cholesterol. She is not having myalgia or other side effects to statin medications.    Hypertension: She presents for follow up of hypertension.  She does not check blood pressure  regularly outside of the clinic. Outpatient blood pressures have not been over 140/90. She does not follow a low salt diet.     She eats 4 or more servings of fruits and vegetables daily.She consumes 0 sweetened beverage(s) daily.She exercises with enough effort to increase her heart rate 20 to 29 minutes per day.  She exercises with enough effort to increase her heart rate 7 days per week.   She is taking medications regularly.        Still having squeaky lung sounds per patient. Using inhaler once in a while. Feeling good otherwise.  Also reports leg heaviness after walking and bilateral hip pain.            Review of Systems  Constitutional, HEENT, cardiovascular, pulmonary, gi and gu systems are negative, except as otherwise noted.      Objective    /42   Pulse 80   Temp 98.3  F (36.8  C) (Oral)   Resp 16   Ht 1.588 m (5' 2.5\")   Wt 59.7 kg (131 lb 9.6 oz)   SpO2 96%   BMI 23.69 kg/m    Body mass index is 23.69 kg/m .  Physical Exam   GENERAL: alert and no distress  EYES: Eyes grossly normal to inspection, PERRL and conjunctivae and sclerae normal  NECK: no adenopathy, no asymmetry, masses, or scars  RESP: squeaky lung sounds. "   CV: regular rate and rhythm, normal S1 S2, no S3 or S4, no murmur, click or rub, no peripheral edema  MS: no gross musculoskeletal defects noted, no edema  SKIN: no suspicious lesions or rashes  NEURO: Normal strength and tone, mentation intact and speech normal  PSYCH: mentation appears normal, affect normal/bright    No results found for this or any previous visit (from the past 24 hours).        Signed Electronically by: Tsering Henson MD

## 2025-06-14 LAB
CREAT UR-MCNC: 59.7 MG/DL
MICROALBUMIN UR-MCNC: <12 MG/L
MICROALBUMIN/CREAT UR: NORMAL MG/G{CREAT}

## 2025-06-19 ENCOUNTER — RESULTS FOLLOW-UP (OUTPATIENT)
Dept: FAMILY MEDICINE | Facility: CLINIC | Age: 85
End: 2025-06-19

## 2025-06-23 ENCOUNTER — PATIENT OUTREACH (OUTPATIENT)
Dept: CARE COORDINATION | Facility: CLINIC | Age: 85
End: 2025-06-23
Payer: COMMERCIAL

## 2025-07-12 DIAGNOSIS — I10 ESSENTIAL HYPERTENSION, BENIGN: ICD-10-CM

## 2025-07-15 RX ORDER — HYDROCHLOROTHIAZIDE 25 MG/1
25 TABLET ORAL DAILY
Qty: 90 TABLET | Refills: 0 | Status: SHIPPED | OUTPATIENT
Start: 2025-07-15

## 2025-07-23 ENCOUNTER — OFFICE VISIT (OUTPATIENT)
Dept: PHYSICAL MEDICINE AND REHAB | Facility: CLINIC | Age: 85
End: 2025-07-23
Attending: FAMILY MEDICINE
Payer: COMMERCIAL

## 2025-07-23 VITALS
HEIGHT: 63 IN | WEIGHT: 131.6 LBS | BODY MASS INDEX: 23.32 KG/M2 | HEART RATE: 88 BPM | SYSTOLIC BLOOD PRESSURE: 173 MMHG | DIASTOLIC BLOOD PRESSURE: 75 MMHG

## 2025-07-23 DIAGNOSIS — R26.2 DIFFICULTY IN WALKING: ICD-10-CM

## 2025-07-23 DIAGNOSIS — M54.50 CHRONIC BILATERAL LOW BACK PAIN WITHOUT SCIATICA: ICD-10-CM

## 2025-07-23 DIAGNOSIS — G89.29 CHRONIC BILATERAL LOW BACK PAIN WITHOUT SCIATICA: ICD-10-CM

## 2025-07-23 DIAGNOSIS — M51.362 DEGENERATION OF INTERVERTEBRAL DISC OF LUMBAR REGION WITH DISCOGENIC BACK PAIN AND LOWER EXTREMITY PAIN: ICD-10-CM

## 2025-07-23 DIAGNOSIS — M54.16 LUMBAR RADICULOPATHY: Primary | ICD-10-CM

## 2025-07-23 DIAGNOSIS — R29.898 HEAVY SENSATION OF LOWER EXTREMITY: ICD-10-CM

## 2025-07-23 ASSESSMENT — PAIN SCALES - GENERAL: PAINLEVEL_OUTOF10: NO PAIN (0)

## 2025-07-23 NOTE — PROGRESS NOTES
ASSESSMENT: Wendy Suárez is a 84 year old female who presents for consultation at the request of PCP Tsering Henson, with a past medical history significant for hypertension, bronchospasms, asthma, hyperlipidemia, type 2 diabetes mellitus, headache, peripheral polyneuropathy, sicca syndrome, chronic kidney disease stage III, carpal tunnel syndrome who presents today for new patient evaluation of:    -Chronic bilateral low back pain with generalized lower extremity heaviness with walking consistent with neurogenic claudication.    -Intermittent left lumbar radiculopathy with achiness in her leg.    Patient is neurologically intact on exam. No myelopathic or red flag symptoms.          No data to display                     Diagnoses and all orders for this visit:  Lumbar radiculopathy  -     Spine  Referral  -     Physical Therapy  Referral; Future  Heavy sensation of lower extremity  -     Spine  Referral  -     Physical Therapy  Referral; Future  Chronic bilateral low back pain without sciatica  -     Physical Therapy  Referral; Future  Degeneration of intervertebral disc of lumbar region with discogenic back pain and lower extremity pain  -     Physical Therapy  Referral; Future  Difficulty in walking  -     Physical Therapy  Referral; Future    PLAN:  Reviewed spine anatomy and disease process. Discussed diagnosis and treatment options with the patient today. A shared decision making model was used.  The patient's values and choices were respected. The following represents what was discussed and decided upon by the provider and the patient.      -DIAGNOSTIC TESTS:  Images were personally reviewed and interpreted and explained to patient today using spine model.   -- Discussed that we could consider lumbar spine MRI for consideration of intervention/injections at any time, she is okay with trialing physical therapy first.    -- Lumbar spine x-ray  6/12/2025 with partial bony anterior auto-fusion L4-5 and L5-S1.  Multilevel significant disc height loss with bridging osteophyte.    -PHYSICAL THERAPY: Referral to physical therapy placed to [facility] in [place] which is her senior living where they do have PT in house, was able to send directly to [facility] internally but also printed form for patient to bring physically there as well.  Goal for core strengthening and nerve glide exercises as well as working on balance and walking fluidity  Discussed the importance of core strengthening, ROM, stretching exercises with the patient and how each of these entities is important in decreasing pain.  Explained to the patient that the purpose of physical therapy is to teach the patient a home exercise program.  These exercises need to be performed every day in order to decrease pain and prevent future occurrences of pain.        -MEDICATIONS: No changes in medications as her symptoms are tolerable.    -INTERVENTIONS: Did discuss considering epidural steroid injections, she is okay with trialing PT first.  Would need MRI prior to considering injections which she understands as well.    Discussed risks and benefits of injections with patient today.    -PATIENT EDUCATION:  Total time of 46 minutes, on the day of service, spent with the patient, reviewing the chart, placing orders, and documenting.     -FOLLOW-UP: Follow-up if symptoms fail to improve with physical therapy or worsen/change at any time    Advised patient to call the Spine Center if symptoms worsen or you have problems controlling bladder and bowel function.   ______________________________________________________________________    SUBJECTIVE:  HPI:  Wendy Suárez  Is a 84 year old female who presents today for new patient evaluation of low back pain bilateral lower lumbar spine left greater than right with lower extremity heaviness with usually walking for about a half a block that has been  bothersome over the last 6 months.  She typically reports that when she sits down her heaviness improves and then she is able to walk again a little longer but this is frustrating for her.    Today she reports her pain is a 0/10 and it is more of a bother than anything concerning in regards to the severity of her pain.  She also has occasional achiness feeling in her left leg to the anterior thigh and anterior shin that she is more concerned about making sure it does not worsen as it is overall tolerable, but she does not want to ignore this new symptom.  Otherwise denies any episodes of her legs giving out on her.  Denies saddle anesthesia.  She does report that she has noticed more rigidity and walking and she does not feel like she has a nice fluid gait like she has had in the past and is wondering if she can work with physical therapy on this as well.    -Treatment to Date: No prior spinal surgery or spinal injections.  No prior physical therapy or chiropractic treatments.    -Medications:  Meloxicam  Gabapentin 300 mg, 2 capsules 3 times daily  Diclofenac gel    Current Outpatient Medications   Medication Sig Dispense Refill    diclofenac (VOLTAREN) 1 % topical gel [VOLTAREN 1 % GEL] APPLY TO UPPER EXTREMITIES, 2 GM OF GEL TO AFFECTED AREA 4 TIMES DAILY.  DO NOT APPLY MORE THAN 8 GM DAILY TO ANY ONE AFFECTED JOINT. Strength: 1 % (Patient taking differently: as needed. [VOLTAREN 1 % GEL] APPLY TO UPPER EXTREMITIES, 2 GM OF GEL TO AFFECTED AREA 4 TIMES DAILY.  DO NOT APPLY MORE THAN 8 GM DAILY TO ANY ONE AFFECTED JOINT. Strength: 1 %) 100 g 3    gabapentin (NEURONTIN) 300 MG capsule TAKE TWO CAPSULES BY MOUTH THREE TIMES DAILY 810 capsule 0    meloxicam (MOBIC) 15 MG tablet Take 1 tablet (15 mg) by mouth once daily 90 tablet 0    albuterol (VENTOLIN HFA) 108 (90 Base) MCG/ACT inhaler Inhale 2 puffs into the lungs every 6 hours as needed for shortness of breath, wheezing or cough. 18 g 0    amitriptyline (ELAVIL)  10 MG tablet Take 1 tablet  by mouth At Bedtime 90 tablet 2    amitriptyline (ELAVIL) 25 MG tablet TAKE 1 TABLET (25 MG) BY MOUTH NIGHTLY AT BEDTIME 90 tablet 2    atorvastatin (LIPITOR) 40 MG tablet Take 1 tablet (40 mg) by mouth once daily 90 tablet 2    cholecalciferol, vitamin D3, 1,000 unit tablet [CHOLECALCIFEROL, VITAMIN D3, 1,000 UNIT TABLET] Take 1,000 Units by mouth daily.      fluticasone propionate (FLONASE) 50 mcg/actuation nasal spray [FLUTICASONE PROPIONATE (FLONASE) 50 MCG/ACTUATION NASAL SPRAY] use 2 sprays each nostril daily. 16 g 2    hydrochlorothiazide (HYDRODIURIL) 25 MG tablet TAKE 1 TABLET BY MOUTH ONCE DAILY. 90 tablet 0    losartan (COZAAR) 100 MG tablet Take 1 tablet (100 mg) by mouth once daily 90 tablet 0    metFORMIN (GLUCOPHAGE XR) 500 MG 24 hr tablet TAKE 1 TABLET BY MOUTH 2 TIMES DAILY WITH MEALS. 180 tablet 1    omeprazole (PRILOSEC) 20 MG DR capsule Take 1 capsule (20 mg) by mouth once daily 90 capsule 2     No current facility-administered medications for this visit.       Allergies   Allergen Reactions    Lisinopril Unknown     cough    Sulfa (Sulfonamide Antibiotics) [Sulfa Antibiotics] Unknown       Past Medical History:   Diagnosis Date    Asthma     high IgE, elevated FENO 115 highest    Cough     Hypertension         Patient Active Problem List   Diagnosis    Carpal Tunnel Syndrome    Menopause    Headache    Tarsal Tunnel Syndrome    Bronchospasm    Hyperlipidemia    Benign Essential Hypertension    Osteoarthritis Of The Hand    Cough    Unspecified cataract    Type 2 diabetes mellitus with stage 3a chronic kidney disease, without long-term current use of insulin (H)    Insomnia, unspecified type    Sicca syndrome    Essential hypertension    Upper airway cough syndrome    Asthma    Elevated IgE level    Eosinophilic asthma    Peripheral polyneuropathy    Asymptomatic postmenopausal status    Hot flashes    Hereditary and idiopathic peripheral neuropathy    Chronic kidney  "disease, stage 3 (H)    Female stress incontinence       Past Surgical History:   Procedure Laterality Date    HYSTERECTOMY Bilateral 1993    was not cancer, for fibroids    OOPHORECTOMY Bilateral 1993       Family History   Problem Relation Age of Onset    Diabetes Father     Hypertension Father     Heart Disease Paternal Grandfather     Alzheimer Disease Mother        Reviewed past medical, surgical, and family history with patient found on new patient intake packet located in EMR Media tab.     ROS: Specifically negative for bowel/bladder dysfunction, balance changes, headache, dizziness, foot drop, fevers, chills, appetite changes, nausea/vomiting, unexplained weight loss. Otherwise 13 systems reviewed are negative. Please see the patient's intake questionnaire from today for details.    OBJECTIVE:  BP (!) 173/75   Pulse 88   Ht 5' 2.5\" (1.588 m)   Wt 131 lb 9.6 oz (59.7 kg)   BMI 23.69 kg/m      PHYSICAL EXAMINATION:    --CONSTITUTIONAL:  Vital signs as above.  No acute distress.  The patient is well nourished and well groomed.  --PSYCHIATRIC:  Appropriate mood and affect. The patient is awake, alert, oriented to person, place, time and answering questions appropriately with clear speech.    --SKIN:  Skin over the face, bilateral lower extremities, and posterior torso is clean, dry, intact without rashes.    --RESPIRATORY: Normal rhythm and effort. No abnormal accessory muscle breathing patterns noted.   --STANDING EXAMINATION:  Normal lumbar lordosis noted, no lateral shift.  --MUSCULOSKELETAL: Range of motion is not limited in lumbar flexion, extension, lateral rotation. No tenderness to palpation lumbar spine. Straight leg raising is negative to radicular pain. Sciatic notch non-tender.  --GROSS MOTOR: Gait is non-antalgic. Easily arises from a seated position. Toe walking and heel walking are normal without significant difficulty.    --LOWER EXTREMITY MOTOR TESTING:  Plantar flexion left 5/5, right 5/5 "   Dorsiflexion left 5/5, right 5/5   Great toe MTP extension left 5/5, right 5/5  Knee flexion left 5/5, right 5/5  Knee extension left 5/5, right 5/5   Hip flexion left 5/5, right 5/5  Hip abduction left 5/5, right 5/5  Hip adduction left 5/5, right 5/5   --HIPS: Full range of motion bilaterally. Negative FABERs on the involved lower extremity.   --NEUROLOGICAL:  2/4 patellar, medial hamstring, and achilles reflexes bilaterally.  Sensation to light touch is intact in the bilateral L4, L5, and S1 dermatomes. Babinski is negative. No clonus.  Negative Gamal reflex bilaterally.  --VASCULAR:  Bilateral lower extremities are warm.      RESULTS: Prior medical records from [facility] and Care Everywhere were reviewed today.    Imaging: Spine imaging was personally reviewed and interpreted today. The images were shown to the patient and the findings were explained using a spine model.      Lumbar spine x-ray reviewed

## 2025-07-23 NOTE — PATIENT INSTRUCTIONS
~Spine Center Scheduling #[phone].  ~Please call our [facility] Spine Nurse Navigation #[phone] with any questions or concerns about your treatment plan, if symptoms worsen and you would like to be seen urgently, or if you have problems controlling bladder and bowel function.  ~For any future flareups or new symptoms, recommend follow-up in clinic or contact the nurse navigator line.  ~Please note that any My Chart messages may take multiple days for a response due to the high volume of patients seen in clinic.  Anything sent Thursday night or after will be answered the following week when able, as Keyla Allred CNP does not work in clinic on Fridays.   ~Keyla Allred CNP is at the [facility] on Tuesdays, otherwise primarily at the [facility].         ~You have been referred for Physical Therapy to [facility] in [place]. They will call you to schedule an appointment.      Discussed the importance of core strengthening, ROM, stretching exercises and how each of these entities is important in decreasing pain and improving long term spine health.  The purpose of physical therapy is to teach you an individualized home exercise program.  These exercises need to be performed every day in order to decrease pain and prevent future occurrences of pain.        An external referral for PT has been placed and will be faxed to the referral facility. Please contact the Spine Center if you do not hear anything from the referral facility in the next week or less.

## 2025-07-23 NOTE — LETTER
7/23/2025      Wendy Suárez  [address]  [facility] [address]      Dear Colleague,    Thank you for referring your patient, Wendy Suárez, to the [facility]. Please see a copy of my visit note below.    ASSESSMENT: Wendy Suárez is a 84 year old female who presents for consultation at the request of PCP Tsering Henson, with a past medical history significant for hypertension, bronchospasms, asthma, hyperlipidemia, type 2 diabetes mellitus, headache, peripheral polyneuropathy, sicca syndrome, chronic kidney disease stage III, carpal tunnel syndrome who presents today for new patient evaluation of:    -Chronic bilateral low back pain with generalized lower extremity heaviness with walking consistent with neurogenic claudication.    -Intermittent left lumbar radiculopathy with achiness in her leg.    Patient is neurologically intact on exam. No myelopathic or red flag symptoms.          No data to display                     Diagnoses and all orders for this visit:  Lumbar radiculopathy  -     Spine  Referral  -     Physical Therapy  Referral; Future  Heavy sensation of lower extremity  -     Spine  Referral  -     Physical Therapy  Referral; Future  Chronic bilateral low back pain without sciatica  -     Physical Therapy  Referral; Future  Degeneration of intervertebral disc of lumbar region with discogenic back pain and lower extremity pain  -     Physical Therapy  Referral; Future  Difficulty in walking  -     Physical Therapy  Referral; Future    PLAN:  Reviewed spine anatomy and disease process. Discussed diagnosis and treatment options with the patient today. A shared decision making model was used.  The patient's values and choices were respected. The following represents what was discussed and decided upon by the provider and the patient.      -DIAGNOSTIC TESTS:  Images were personally reviewed and  interpreted and explained to patient today using spine model.   -- Discussed that we could consider lumbar spine MRI for consideration of intervention/injections at any time, she is okay with trialing physical therapy first.    -- Lumbar spine x-ray 6/12/2025 with partial bony anterior auto-fusion L4-5 and L5-S1.  Multilevel significant disc height loss with bridging osteophyte.    -PHYSICAL THERAPY: Referral to physical therapy placed to [facility] in [place] which is her senior living where they do have PT in house, was able to send directly to [facility] internally but also printed form for patient to bring physically there as well.  Goal for core strengthening and nerve glide exercises as well as working on balance and walking fluidity  Discussed the importance of core strengthening, ROM, stretching exercises with the patient and how each of these entities is important in decreasing pain.  Explained to the patient that the purpose of physical therapy is to teach the patient a home exercise program.  These exercises need to be performed every day in order to decrease pain and prevent future occurrences of pain.        -MEDICATIONS: No changes in medications as her symptoms are tolerable.    -INTERVENTIONS: Did discuss considering epidural steroid injections, she is okay with trialing PT first.  Would need MRI prior to considering injections which she understands as well.    Discussed risks and benefits of injections with patient today.    -PATIENT EDUCATION:  Total time of 46 minutes, on the day of service, spent with the patient, reviewing the chart, placing orders, and documenting.     -FOLLOW-UP: Follow-up if symptoms fail to improve with physical therapy or worsen/change at any time    Advised patient to call the Spine Center if symptoms worsen or you have problems controlling bladder and bowel function.    ______________________________________________________________________    SUBJECTIVE:  HPI:  Wendy Suárez  Is a 84 year old female who presents today for new patient evaluation of low back pain bilateral lower lumbar spine left greater than right with lower extremity heaviness with usually walking for about a half a block that has been bothersome over the last 6 months.  She typically reports that when she sits down her heaviness improves and then she is able to walk again a little longer but this is frustrating for her.    Today she reports her pain is a 0/10 and it is more of a bother than anything concerning in regards to the severity of her pain.  She also has occasional achiness feeling in her left leg to the anterior thigh and anterior shin that she is more concerned about making sure it does not worsen as it is overall tolerable, but she does not want to ignore this new symptom.  Otherwise denies any episodes of her legs giving out on her.  Denies saddle anesthesia.  She does report that she has noticed more rigidity and walking and she does not feel like she has a nice fluid gait like she has had in the past and is wondering if she can work with physical therapy on this as well.    -Treatment to Date: No prior spinal surgery or spinal injections.  No prior physical therapy or chiropractic treatments.    -Medications:  Meloxicam  Gabapentin 300 mg, 2 capsules 3 times daily  Diclofenac gel    Current Outpatient Medications   Medication Sig Dispense Refill     diclofenac (VOLTAREN) 1 % topical gel [VOLTAREN 1 % GEL] APPLY TO UPPER EXTREMITIES, 2 GM OF GEL TO AFFECTED AREA 4 TIMES DAILY.  DO NOT APPLY MORE THAN 8 GM DAILY TO ANY ONE AFFECTED JOINT. Strength: 1 % (Patient taking differently: as needed. [VOLTAREN 1 % GEL] APPLY TO UPPER EXTREMITIES, 2 GM OF GEL TO AFFECTED AREA 4 TIMES DAILY.  DO NOT APPLY MORE THAN 8 GM DAILY TO ANY ONE AFFECTED JOINT. Strength: 1 %) 100 g 3     gabapentin (NEURONTIN) 300 MG  capsule TAKE TWO CAPSULES BY MOUTH THREE TIMES DAILY 810 capsule 0     meloxicam (MOBIC) 15 MG tablet Take 1 tablet (15 mg) by mouth once daily 90 tablet 0     albuterol (VENTOLIN HFA) 108 (90 Base) MCG/ACT inhaler Inhale 2 puffs into the lungs every 6 hours as needed for shortness of breath, wheezing or cough. 18 g 0     amitriptyline (ELAVIL) 10 MG tablet Take 1 tablet  by mouth At Bedtime 90 tablet 2     amitriptyline (ELAVIL) 25 MG tablet TAKE 1 TABLET (25 MG) BY MOUTH NIGHTLY AT BEDTIME 90 tablet 2     atorvastatin (LIPITOR) 40 MG tablet Take 1 tablet (40 mg) by mouth once daily 90 tablet 2     cholecalciferol, vitamin D3, 1,000 unit tablet [CHOLECALCIFEROL, VITAMIN D3, 1,000 UNIT TABLET] Take 1,000 Units by mouth daily.       fluticasone propionate (FLONASE) 50 mcg/actuation nasal spray [FLUTICASONE PROPIONATE (FLONASE) 50 MCG/ACTUATION NASAL SPRAY] use 2 sprays each nostril daily. 16 g 2     hydrochlorothiazide (HYDRODIURIL) 25 MG tablet TAKE 1 TABLET BY MOUTH ONCE DAILY. 90 tablet 0     losartan (COZAAR) 100 MG tablet Take 1 tablet (100 mg) by mouth once daily 90 tablet 0     metFORMIN (GLUCOPHAGE XR) 500 MG 24 hr tablet TAKE 1 TABLET BY MOUTH 2 TIMES DAILY WITH MEALS. 180 tablet 1     omeprazole (PRILOSEC) 20 MG DR capsule Take 1 capsule (20 mg) by mouth once daily 90 capsule 2     No current facility-administered medications for this visit.       Allergies   Allergen Reactions     Lisinopril Unknown     cough     Sulfa (Sulfonamide Antibiotics) [Sulfa Antibiotics] Unknown       Past Medical History:   Diagnosis Date     Asthma     high IgE, elevated FENO 115 highest     Cough      Hypertension         Patient Active Problem List   Diagnosis     Carpal Tunnel Syndrome     Menopause     Headache     Tarsal Tunnel Syndrome     Bronchospasm     Hyperlipidemia     Benign Essential Hypertension     Osteoarthritis Of The Hand     Cough     Unspecified cataract     Type 2 diabetes mellitus with stage 3a chronic  "kidney disease, without long-term current use of insulin (H)     Insomnia, unspecified type     Sicca syndrome     Essential hypertension     Upper airway cough syndrome     Asthma     Elevated IgE level     Eosinophilic asthma     Peripheral polyneuropathy     Asymptomatic postmenopausal status     Hot flashes     Hereditary and idiopathic peripheral neuropathy     Chronic kidney disease, stage 3 (H)     Female stress incontinence       Past Surgical History:   Procedure Laterality Date     HYSTERECTOMY Bilateral 1993    was not cancer, for fibroids     OOPHORECTOMY Bilateral 1993       Family History   Problem Relation Age of Onset     Diabetes Father      Hypertension Father      Heart Disease Paternal Grandfather      Alzheimer Disease Mother        Reviewed past medical, surgical, and family history with patient found on new patient intake packet located in EMR Media tab.     ROS: Specifically negative for bowel/bladder dysfunction, balance changes, headache, dizziness, foot drop, fevers, chills, appetite changes, nausea/vomiting, unexplained weight loss. Otherwise 13 systems reviewed are negative. Please see the patient's intake questionnaire from today for details.    OBJECTIVE:  BP (!) 173/75   Pulse 88   Ht 5' 2.5\" (1.588 m)   Wt 131 lb 9.6 oz (59.7 kg)   BMI 23.69 kg/m      PHYSICAL EXAMINATION:    --CONSTITUTIONAL:  Vital signs as above.  No acute distress.  The patient is well nourished and well groomed.  --PSYCHIATRIC:  Appropriate mood and affect. The patient is awake, alert, oriented to person, place, time and answering questions appropriately with clear speech.    --SKIN:  Skin over the face, bilateral lower extremities, and posterior torso is clean, dry, intact without rashes.    --RESPIRATORY: Normal rhythm and effort. No abnormal accessory muscle breathing patterns noted.   --STANDING EXAMINATION:  Normal lumbar lordosis noted, no lateral shift.  --MUSCULOSKELETAL: Range of motion is not " limited in lumbar flexion, extension, lateral rotation. No tenderness to palpation lumbar spine. Straight leg raising is negative to radicular pain. Sciatic notch non-tender.  --GROSS MOTOR: Gait is non-antalgic. Easily arises from a seated position. Toe walking and heel walking are normal without significant difficulty.    --LOWER EXTREMITY MOTOR TESTING:  Plantar flexion left 5/5, right 5/5   Dorsiflexion left 5/5, right 5/5   Great toe MTP extension left 5/5, right 5/5  Knee flexion left 5/5, right 5/5  Knee extension left 5/5, right 5/5   Hip flexion left 5/5, right 5/5  Hip abduction left 5/5, right 5/5  Hip adduction left 5/5, right 5/5   --HIPS: Full range of motion bilaterally. Negative FABERs on the involved lower extremity.   --NEUROLOGICAL:  2/4 patellar, medial hamstring, and achilles reflexes bilaterally.  Sensation to light touch is intact in the bilateral L4, L5, and S1 dermatomes. Babinski is negative. No clonus.  Negative Gamal reflex bilaterally.  --VASCULAR:  Bilateral lower extremities are warm.      RESULTS: Prior medical records from [facility] and Care Everywhere were reviewed today.    Imaging: Spine imaging was personally reviewed and interpreted today. The images were shown to the patient and the findings were explained using a spine model.      Lumbar spine x-ray reviewed           Again, thank you for allowing me to participate in the care of your patient.        Sincerely,        Keyla Allred CNP    Electronically signed

## 2025-08-06 ENCOUNTER — HOSPITAL ENCOUNTER (OUTPATIENT)
Dept: GENERAL RADIOLOGY | Facility: HOSPITAL | Age: 85
Discharge: HOME | End: 2025-08-06
Attending: PHYSICIAN ASSISTANT
Payer: COMMERCIAL

## 2025-08-06 DIAGNOSIS — J18.9 COMMUNITY ACQUIRED PNEUMONIA OF RIGHT LOWER LOBE OF LUNG: ICD-10-CM

## 2025-08-06 PROCEDURE — 71046 X-RAY EXAM CHEST 2 VIEWS: CPT

## 2025-08-07 DIAGNOSIS — I10 ESSENTIAL HYPERTENSION, BENIGN: ICD-10-CM

## 2025-08-07 DIAGNOSIS — R05.8 DRY COUGH: ICD-10-CM

## 2025-08-07 DIAGNOSIS — R13.19 ESOPHAGEAL DYSPHAGIA: ICD-10-CM

## 2025-08-07 RX ORDER — LOSARTAN POTASSIUM 100 MG/1
100 TABLET ORAL DAILY
Qty: 90 TABLET | Refills: 0 | Status: SHIPPED | OUTPATIENT
Start: 2025-08-07

## 2025-08-07 RX ORDER — OMEPRAZOLE 20 MG/1
20 CAPSULE, DELAYED RELEASE ORAL DAILY
Qty: 90 CAPSULE | Refills: 2 | Status: SHIPPED | OUTPATIENT
Start: 2025-08-07

## 2025-08-19 DIAGNOSIS — R51.9 NONINTRACTABLE HEADACHE, UNSPECIFIED CHRONICITY PATTERN, UNSPECIFIED HEADACHE TYPE: Primary | ICD-10-CM

## 2025-08-19 DIAGNOSIS — G47.00 INSOMNIA, UNSPECIFIED TYPE: ICD-10-CM

## 2025-08-26 RX ORDER — AMITRIPTYLINE HYDROCHLORIDE 10 MG/1
10 TABLET ORAL AT BEDTIME
Qty: 90 TABLET | Refills: 1 | Status: SHIPPED | OUTPATIENT
Start: 2025-08-26

## 2025-09-02 DIAGNOSIS — R51.9 NONINTRACTABLE HEADACHE, UNSPECIFIED CHRONICITY PATTERN, UNSPECIFIED HEADACHE TYPE: ICD-10-CM

## 2025-09-02 DIAGNOSIS — G47.00 INSOMNIA, UNSPECIFIED TYPE: ICD-10-CM

## 2025-09-02 DIAGNOSIS — J18.9 PNEUMONIA OF RIGHT LOWER LOBE DUE TO INFECTIOUS ORGANISM: ICD-10-CM

## 2025-09-04 RX ORDER — AMITRIPTYLINE HYDROCHLORIDE 10 MG/1
10 TABLET ORAL AT BEDTIME
Qty: 90 TABLET | Refills: 1 | Status: SHIPPED | OUTPATIENT
Start: 2025-09-04 | End: 2025-09-04

## 2025-09-04 RX ORDER — AMITRIPTYLINE HYDROCHLORIDE 10 MG/1
10 TABLET ORAL AT BEDTIME
Qty: 90 TABLET | Refills: 1 | Status: SHIPPED | OUTPATIENT
Start: 2025-09-04

## 2025-09-04 RX ORDER — ALBUTEROL SULFATE 90 UG/1
2 INHALANT RESPIRATORY (INHALATION) EVERY 6 HOURS PRN
Qty: 18 G | Refills: 0 | Status: SHIPPED | OUTPATIENT
Start: 2025-09-04